# Patient Record
Sex: FEMALE | Race: WHITE | Employment: UNEMPLOYED | URBAN - METROPOLITAN AREA
[De-identification: names, ages, dates, MRNs, and addresses within clinical notes are randomized per-mention and may not be internally consistent; named-entity substitution may affect disease eponyms.]

---

## 2017-06-21 PROBLEM — K80.20 CALCULUS OF GALLBLADDER WITHOUT CHOLECYSTITIS: Status: ACTIVE | Noted: 2017-06-21

## 2017-07-11 PROBLEM — N93.9 ABNORMAL UTERINE BLEEDING (AUB): Status: ACTIVE | Noted: 2017-07-11

## 2017-12-19 PROBLEM — F45.41 STRESS HEADACHES: Status: ACTIVE | Noted: 2017-12-19

## 2017-12-21 PROBLEM — H04.123 BILATERAL DRY EYES: Status: ACTIVE | Noted: 2017-12-21

## 2017-12-21 PROBLEM — H40.033 ANATOMICAL NARROW ANGLE BORDERLINE GLAUCOMA, BILATERAL: Status: ACTIVE | Noted: 2017-12-21

## 2018-01-11 ENCOUNTER — HOSPITAL ENCOUNTER (OUTPATIENT)
Dept: MAMMOGRAPHY | Age: 53
Discharge: HOME | End: 2018-01-11
Payer: COMMERCIAL

## 2018-01-11 DIAGNOSIS — Z12.31 VISIT FOR SCREENING MAMMOGRAM: ICD-10-CM

## 2018-01-11 PROCEDURE — 77067 SCR MAMMO BI INCL CAD: CPT

## 2018-02-27 PROBLEM — N91.2 AMENORRHEA: Status: ACTIVE | Noted: 2018-02-27

## 2018-03-12 ENCOUNTER — HOSPITAL ENCOUNTER (OUTPATIENT)
Dept: CT IMAGING | Age: 53
Discharge: HOME | End: 2018-03-12
Payer: COMMERCIAL

## 2018-03-12 DIAGNOSIS — J34.89 SINUS PRESSURE: ICD-10-CM

## 2018-03-12 PROCEDURE — 70486 CT MAXILLOFACIAL W/O DYE: CPT

## 2018-03-12 NOTE — PROGRESS NOTES
Spoke with patient who Parent voices understanding and acceptance of these results and advice. Patient stated that yes she would like to e referred to an ENT. Patient will call back if any further questions or concerns.

## 2018-03-12 NOTE — PROGRESS NOTES
CT of her sinuses shows that her sinuses are clear. She has a septal deviation to the right with a bone spur. Not really sure if this is contributing to her symptoms or not, since she was complaining of pressure being worse on the left at her last visit. Would be glad to refer her to ENT for further eval if she would like.

## 2018-06-11 ENCOUNTER — HOSPITAL ENCOUNTER (OUTPATIENT)
Dept: SURGERY | Age: 53
Discharge: HOME | End: 2018-06-11

## 2018-06-13 VITALS — HEIGHT: 62 IN | BODY MASS INDEX: 33.86 KG/M2 | WEIGHT: 184 LBS

## 2018-06-13 NOTE — PERIOP NOTES
Patient verified name, , and surgery as listed in [facility]. Type 1B surgery, phone assessment complete. Orders not received. Labs per surgeon: no orders on chart or in EMR at this time  Labs per anesthesia protocol: none    Patient answered medical/surgical history questions at their best of ability. All prior to admission medications documented in [facility]. Patient instructed to take the following medications the day of surgery according to anesthesia guidelines with a small sip of water: synthroid. Hold all vitamins 7 days prior to surgery and NSAIDS 5 days prior to surgery. Medications to be held- vitamins. Patient instructed on the following:  Arrive at A Entrance, time of arrival to be called the day before by 1700  NPO after midnight including gum, mints, and ice chips  Responsible adult must drive patient to the hospital, stay during surgery, and patient will need supervision 24 hours after anesthesia  Use antibacterial soap in shower the night before surgery and on the morning of surgery  Leave all valuables (money and jewelry) at home but bring insurance card and ID on DOS  Do not wear make-up, nail polish, lotions, cologne, perfumes, powders, or oil on skin. Patient teach back successful and patient demonstrates knowledge of instruction.

## 2018-06-17 ENCOUNTER — ANESTHESIA EVENT (OUTPATIENT)
Dept: SURGERY | Age: 53
End: 2018-06-17
Payer: COMMERCIAL

## 2018-06-18 ENCOUNTER — HOSPITAL ENCOUNTER (OUTPATIENT)
Age: 53
Setting detail: OUTPATIENT SURGERY
Discharge: HOME | End: 2018-06-18
Attending: OTOLARYNGOLOGY | Admitting: OTOLARYNGOLOGY
Payer: COMMERCIAL

## 2018-06-18 ENCOUNTER — ANESTHESIA (OUTPATIENT)
Dept: SURGERY | Age: 53
End: 2018-06-18
Payer: COMMERCIAL

## 2018-06-18 VITALS
RESPIRATION RATE: 16 BRPM | TEMPERATURE: 98.3 F | HEIGHT: 62 IN | BODY MASS INDEX: 33.86 KG/M2 | DIASTOLIC BLOOD PRESSURE: 71 MMHG | HEART RATE: 64 BPM | SYSTOLIC BLOOD PRESSURE: 108 MMHG | OXYGEN SATURATION: 94 % | WEIGHT: 184 LBS

## 2018-06-18 LAB — GLUCOSE BLD STRIP.AUTO-MCNC: 78 MG/DL (ref 65–100)

## 2018-06-18 PROCEDURE — 76210000020 HC REC RM PH II FIRST 0.5 HR: Performed by: OTOLARYNGOLOGY

## 2018-06-18 PROCEDURE — 76210000016 HC OR PH I REC 1 TO 1.5 HR: Performed by: OTOLARYNGOLOGY

## 2018-06-18 PROCEDURE — 77030011640 HC PAD GRND REM COVD -A: Performed by: OTOLARYNGOLOGY

## 2018-06-18 PROCEDURE — 76060000032 HC ANESTHESIA 0.5 TO 1 HR: Performed by: OTOLARYNGOLOGY

## 2018-06-18 PROCEDURE — 76010000138 HC OR TIME 0.5 TO 1 HR: Performed by: OTOLARYNGOLOGY

## 2018-06-18 PROCEDURE — 77030002888 HC SUT CHRMC J&J -A: Performed by: OTOLARYNGOLOGY

## 2018-06-18 PROCEDURE — 77030008477 HC STYL SATN SLP COVD -A: Performed by: ANESTHESIOLOGY

## 2018-06-18 PROCEDURE — 74011000250 HC RX REV CODE- 250

## 2018-06-18 PROCEDURE — 74011000250 HC RX REV CODE- 250: Performed by: ANESTHESIOLOGY

## 2018-06-18 PROCEDURE — 74011250636 HC RX REV CODE- 250/636

## 2018-06-18 PROCEDURE — 74011000250 HC RX REV CODE- 250: Performed by: OTOLARYNGOLOGY

## 2018-06-18 PROCEDURE — 74011250636 HC RX REV CODE- 250/636: Performed by: ANESTHESIOLOGY

## 2018-06-18 PROCEDURE — 77030036727 HC DEV INFL BLN SNUS SE DISP ACCL -B: Performed by: OTOLARYNGOLOGY

## 2018-06-18 PROCEDURE — 77030008357 HC SPLNT NSL INT THOM -B: Performed by: OTOLARYNGOLOGY

## 2018-06-18 PROCEDURE — 74011250637 HC RX REV CODE- 250/637: Performed by: OTOLARYNGOLOGY

## 2018-06-18 PROCEDURE — 77030006907 HC BLD SNUS SHV MEDT -C: Performed by: OTOLARYNGOLOGY

## 2018-06-18 PROCEDURE — 77030018836 HC SOL IRR NACL ICUM -A: Performed by: OTOLARYNGOLOGY

## 2018-06-18 PROCEDURE — 77030009845 HC ONTMNT BACITRCN PATT -A: Performed by: OTOLARYNGOLOGY

## 2018-06-18 PROCEDURE — 77030036884 HC CATH GD SPNPLS RELV TIP DISP KT ACCL -G1: Performed by: OTOLARYNGOLOGY

## 2018-06-18 PROCEDURE — 82962 GLUCOSE BLOOD TEST: CPT

## 2018-06-18 PROCEDURE — 77030008703 HC TU ET UNCUF COVD -A: Performed by: ANESTHESIOLOGY

## 2018-06-18 PROCEDURE — 77030002916 HC SUT ETHLN J&J -A: Performed by: OTOLARYNGOLOGY

## 2018-06-18 PROCEDURE — 74011250637 HC RX REV CODE- 250/637: Performed by: ANESTHESIOLOGY

## 2018-06-18 PROCEDURE — 77030020782 HC GWN BAIR PAWS FLX 3M -B: Performed by: ANESTHESIOLOGY

## 2018-06-18 RX ORDER — LIDOCAINE HYDROCHLORIDE AND EPINEPHRINE 10; 10 MG/ML; UG/ML
INJECTION, SOLUTION INFILTRATION; PERINEURAL AS NEEDED
Status: DISCONTINUED | OUTPATIENT
Start: 2018-06-18 | End: 2018-06-18 | Stop reason: HOSPADM

## 2018-06-18 RX ORDER — LIDOCAINE HYDROCHLORIDE 20 MG/ML
INJECTION, SOLUTION EPIDURAL; INFILTRATION; INTRACAUDAL; PERINEURAL AS NEEDED
Status: DISCONTINUED | OUTPATIENT
Start: 2018-06-18 | End: 2018-06-18 | Stop reason: HOSPADM

## 2018-06-18 RX ORDER — GLYCOPYRROLATE 0.2 MG/ML
INJECTION INTRAMUSCULAR; INTRAVENOUS AS NEEDED
Status: DISCONTINUED | OUTPATIENT
Start: 2018-06-18 | End: 2018-06-18 | Stop reason: HOSPADM

## 2018-06-18 RX ORDER — SODIUM CHLORIDE, SODIUM LACTATE, POTASSIUM CHLORIDE, CALCIUM CHLORIDE 600; 310; 30; 20 MG/100ML; MG/100ML; MG/100ML; MG/100ML
75 INJECTION, SOLUTION INTRAVENOUS CONTINUOUS
Status: DISCONTINUED | OUTPATIENT
Start: 2018-06-18 | End: 2018-06-18 | Stop reason: HOSPADM

## 2018-06-18 RX ORDER — LIDOCAINE HYDROCHLORIDE 10 MG/ML
0.1 INJECTION INFILTRATION; PERINEURAL AS NEEDED
Status: DISCONTINUED | OUTPATIENT
Start: 2018-06-18 | End: 2018-06-18 | Stop reason: HOSPADM

## 2018-06-18 RX ORDER — HYDROMORPHONE HYDROCHLORIDE 2 MG/ML
0.5 INJECTION, SOLUTION INTRAMUSCULAR; INTRAVENOUS; SUBCUTANEOUS
Status: DISCONTINUED | OUTPATIENT
Start: 2018-06-18 | End: 2018-06-18 | Stop reason: HOSPADM

## 2018-06-18 RX ORDER — OXYCODONE HYDROCHLORIDE 5 MG/1
5 TABLET ORAL
Status: DISCONTINUED | OUTPATIENT
Start: 2018-06-18 | End: 2018-06-18 | Stop reason: HOSPADM

## 2018-06-18 RX ORDER — OXYMETAZOLINE HCL 0.05 %
SPRAY, NON-AEROSOL (ML) NASAL AS NEEDED
Status: DISCONTINUED | OUTPATIENT
Start: 2018-06-18 | End: 2018-06-18 | Stop reason: HOSPADM

## 2018-06-18 RX ORDER — OXYCODONE HYDROCHLORIDE 5 MG/1
10 TABLET ORAL
Status: DISCONTINUED | OUTPATIENT
Start: 2018-06-18 | End: 2018-06-18 | Stop reason: HOSPADM

## 2018-06-18 RX ORDER — ROCURONIUM BROMIDE 10 MG/ML
INJECTION, SOLUTION INTRAVENOUS AS NEEDED
Status: DISCONTINUED | OUTPATIENT
Start: 2018-06-18 | End: 2018-06-18 | Stop reason: HOSPADM

## 2018-06-18 RX ORDER — MIDAZOLAM HYDROCHLORIDE 1 MG/ML
2 INJECTION, SOLUTION INTRAMUSCULAR; INTRAVENOUS ONCE
Status: DISCONTINUED | OUTPATIENT
Start: 2018-06-18 | End: 2018-06-18 | Stop reason: HOSPADM

## 2018-06-18 RX ORDER — FENTANYL CITRATE 50 UG/ML
100 INJECTION, SOLUTION INTRAMUSCULAR; INTRAVENOUS ONCE
Status: DISCONTINUED | OUTPATIENT
Start: 2018-06-18 | End: 2018-06-18 | Stop reason: HOSPADM

## 2018-06-18 RX ORDER — FENTANYL CITRATE 50 UG/ML
INJECTION, SOLUTION INTRAMUSCULAR; INTRAVENOUS AS NEEDED
Status: DISCONTINUED | OUTPATIENT
Start: 2018-06-18 | End: 2018-06-18 | Stop reason: HOSPADM

## 2018-06-18 RX ORDER — DEXAMETHASONE SODIUM PHOSPHATE 4 MG/ML
INJECTION, SOLUTION INTRA-ARTICULAR; INTRALESIONAL; INTRAMUSCULAR; INTRAVENOUS; SOFT TISSUE AS NEEDED
Status: DISCONTINUED | OUTPATIENT
Start: 2018-06-18 | End: 2018-06-18 | Stop reason: HOSPADM

## 2018-06-18 RX ORDER — ONDANSETRON 2 MG/ML
INJECTION INTRAMUSCULAR; INTRAVENOUS AS NEEDED
Status: DISCONTINUED | OUTPATIENT
Start: 2018-06-18 | End: 2018-06-18 | Stop reason: HOSPADM

## 2018-06-18 RX ORDER — NEOSTIGMINE METHYLSULFATE 1 MG/ML
INJECTION INTRAVENOUS AS NEEDED
Status: DISCONTINUED | OUTPATIENT
Start: 2018-06-18 | End: 2018-06-18 | Stop reason: HOSPADM

## 2018-06-18 RX ORDER — FAMOTIDINE 20 MG/1
20 TABLET, FILM COATED ORAL ONCE
Status: COMPLETED | OUTPATIENT
Start: 2018-06-18 | End: 2018-06-18

## 2018-06-18 RX ORDER — KETOROLAC TROMETHAMINE 30 MG/ML
INJECTION, SOLUTION INTRAMUSCULAR; INTRAVENOUS AS NEEDED
Status: DISCONTINUED | OUTPATIENT
Start: 2018-06-18 | End: 2018-06-18 | Stop reason: HOSPADM

## 2018-06-18 RX ADMIN — DEXAMETHASONE SODIUM PHOSPHATE 10 MG: 4 INJECTION, SOLUTION INTRA-ARTICULAR; INTRALESIONAL; INTRAMUSCULAR; INTRAVENOUS; SOFT TISSUE at 17:09

## 2018-06-18 RX ADMIN — SODIUM CHLORIDE, SODIUM LACTATE, POTASSIUM CHLORIDE, AND CALCIUM CHLORIDE: 600; 310; 30; 20 INJECTION, SOLUTION INTRAVENOUS at 17:30

## 2018-06-18 RX ADMIN — GLYCOPYRROLATE 0.4 MG: 0.2 INJECTION INTRAMUSCULAR; INTRAVENOUS at 17:15

## 2018-06-18 RX ADMIN — FAMOTIDINE 20 MG: 20 TABLET ORAL at 14:16

## 2018-06-18 RX ADMIN — SODIUM CHLORIDE, SODIUM LACTATE, POTASSIUM CHLORIDE, AND CALCIUM CHLORIDE: 600; 310; 30; 20 INJECTION, SOLUTION INTRAVENOUS at 16:38

## 2018-06-18 RX ADMIN — SODIUM CHLORIDE, SODIUM LACTATE, POTASSIUM CHLORIDE, AND CALCIUM CHLORIDE 75 ML/HR: 600; 310; 30; 20 INJECTION, SOLUTION INTRAVENOUS at 14:26

## 2018-06-18 RX ADMIN — FENTANYL CITRATE 100 MCG: 50 INJECTION, SOLUTION INTRAMUSCULAR; INTRAVENOUS at 16:41

## 2018-06-18 RX ADMIN — LIDOCAINE HYDROCHLORIDE 100 MG: 20 INJECTION, SOLUTION EPIDURAL; INFILTRATION; INTRACAUDAL; PERINEURAL at 16:41

## 2018-06-18 RX ADMIN — LIDOCAINE HYDROCHLORIDE 0.1 ML: 10 INJECTION, SOLUTION INFILTRATION; PERINEURAL at 14:26

## 2018-06-18 RX ADMIN — NEOSTIGMINE METHYLSULFATE 3 MG: 1 INJECTION INTRAVENOUS at 17:15

## 2018-06-18 RX ADMIN — ROCURONIUM BROMIDE 30 MG: 10 INJECTION, SOLUTION INTRAVENOUS at 16:41

## 2018-06-18 RX ADMIN — HYDROMORPHONE HYDROCHLORIDE 0.5 MG: 2 INJECTION, SOLUTION INTRAMUSCULAR; INTRAVENOUS; SUBCUTANEOUS at 18:00

## 2018-06-18 RX ADMIN — ONDANSETRON 4 MG: 2 INJECTION INTRAMUSCULAR; INTRAVENOUS at 17:09

## 2018-06-18 RX ADMIN — KETOROLAC TROMETHAMINE 30 MG: 30 INJECTION, SOLUTION INTRAMUSCULAR; INTRAVENOUS at 17:13

## 2018-06-18 RX ADMIN — HYDROMORPHONE HYDROCHLORIDE 0.5 MG: 2 INJECTION, SOLUTION INTRAMUSCULAR; INTRAVENOUS; SUBCUTANEOUS at 18:05

## 2018-06-18 NOTE — DISCHARGE INSTRUCTIONS
Endoscopic Sinus Surgery: What to Expect at [address] will have a drip pad under your nose to collect mucus and blood. Change it only when it bleeds through. You may have to do this every hour for 24 hours after surgery. You may have some swelling of your nose, upper lip, cheeks, or around your eyes. Your nose may be sore and will bleed. You may feel \"stuffed up\" like you have a bad head cold. This will last for several days after surgery. The tip of your nose and your upper lip and gums may be numb. Feeling will return in a few weeks to a few months. Your sense of smell will not be as good after surgery. It will improve and probably return to normal in 1 to 2 months. You will probably be able to return to work or school in about 1 week and to your normal routine in about 3 weeks. However, this varies with your job and the extent of your surgery. Most people feel normal in 1 to 2 months. You will have to visit your doctor regularly for 3 to 4 months after your surgery. Your doctor will check to see that your sinuses are healing well. This care sheet gives you a general idea about how long it will take for you to recover. But each person recovers at a different pace. Follow the steps below to get better as quickly as possible. How can you care for yourself at home? Activity  ? · Rest when you feel tired. Getting enough sleep will help you recover. Do not lie flat. Raise your head with three or four pillows. This can reduce swelling. Try to sleep on your back during the month after surgery. You can also sleep in a reclining chair. ? · Try to walk each day. Start by walking a little more than you did the day before. Bit by bit, increase the amount you walk. Walking boosts blood flow and helps prevent pneumonia and constipation. Also, try to sit and stand as much as you can.   ? · For 1 week, try not to bend over or lift anything heavier than 10 pounds.  This may include a child, heavy grocery bags and milk containers, a heavy briefcase or backpack, cat litter or dog food bags, or a vacuum . ? · You can take a shower or bath. Use lukewarm, not hot water. Avoid swimming for 6 weeks. ? · Avoid sawdust, chemicals, and excessive dust for 4 weeks. ? · Avoid strenuous activities, such as biking, jogging, weight lifting, or aerobic exercise, for 1 week and then ease back into these activities over 2 to 3 weeks. ? · You may drive when you are no longer taking prescription pain medicine and feel up to it. ? · You will probably be able to return to work or school in about 1 week and to your normal routine in about 3 weeks. However, this varies with your job and the extent of your surgery. Diet  ? · You can eat your normal diet. If your stomach is upset, try bland, low-fat foods like plain rice, broiled chicken, toast, and yogurt. ? · You may notice that your bowel movements are not regular right after your surgery. This is common. Try to avoid constipation and straining with bowel movements. You may want to take a fiber supplement every day. If you have not had a bowel movement after a couple of days, ask your doctor about taking a mild laxative. Medicines  ? · Your doctor will tell you if and when you can restart your medicines. He or she will also give you instructions about taking any new medicines. ? · If you take blood thinners, such as warfarin (Coumadin), clopidogrel (Plavix), or aspirin, be sure to talk to your doctor. He or she will tell you if and when to start taking those medicines again. Make sure that you understand exactly what your doctor wants you to do. ? · Do not take aspirin, aspirin-containing medicines, or anti-inflammatory medicines such as ibuprofen (Advil, Motrin) or naproxen (Aleve) for 3 weeks following surgery unless your doctor says it is okay. ? · Take pain medicines exactly as directed.   ¨ If the doctor gave you a prescription medicine for pain, take it as prescribed. ¨ Do not take two or more pain medicines at the same time unless the doctor told you to. Many pain medicines have acetaminophen, which is Tylenol. Too much acetaminophen (Tylenol) can be harmful. ? · If your doctor prescribed antibiotics, take them as directed. Do not stop taking them just because you feel better. You need to take the full course of antibiotics. ? · If you think your pain medicine is making you sick to your stomach:  ¨ Take your medicine after meals (unless your doctor has told you not to). ¨ Ask your doctor for a different pain medicine. Incision care  ? · You probably will not have an incision (cut). You will have a drip pad under your nose to collect blood. Change it only when it has bled through. You may have to do this every hour for 24 hours after surgery. When bleeding stops, you can remove it. ? · If you have packing in your nose, leave it in. Your doctor will take it out. Ice and elevation  ? · To help with swelling and pain, put ice or a cold pack on your nose for 10 to 20 minutes at a time. Put a thin cloth between the ice and your skin. ? · Do not sleep flat. Sleep with your head raised up. You can also sleep in a reclining chair. Other instructions  ? · Do not blow your nose for 2 weeks. ? · Do not put anything into your nose. ? · If you must sneeze, open your mouth and sneeze naturally. ? · Keep your mouth clean. Rinse your mouth with salt water or an alcohol-free mouthwash after each meal and before bedtime. ? · After any packing is removed, use saline (saltwater) nasal washes to help keep your nasal passages open and wash out mucus and bacteria. You can buy saline nose drops at a grocery store or drugstore. ? · You can wear your glasses when you wish. Do not wear contacts until the day after the surgery. ? · Do not travel by airplane for at least 2 weeks.  Your sinuses are still healing, and the changes in air pressure can affect them.   Follow-up care is a key part of your treatment and safety. Be sure to make and go to all appointments, and call your doctor if you are having problems. It's also a good idea to know your test results and keep a list of the medicines you take. When should you call for help? Call 911 anytime you think you may need emergency care. For example, call if:  ? · You passed out (lost consciousness). ? · You have severe trouble breathing. ? · You have chest pain, have shortness of breath, or you cough up blood. ?Call your doctor now or seek immediate medical care if:  ? · You have signs of infection, such as:  ¨ Increased pain, swelling, warmth, or redness. ¨ Red streaks leading from the incision. ¨ Pus draining from the incision. ¨ A fever. ? · You bleed through the bandage. ? · You have symptoms of a blood clot in your leg (called a deep vein thrombosis), such as:  ¨ Pain in the calf, back of the knee, thigh, or groin. ¨ Redness and swelling in your leg or groin. ? · You have pain that does not get better after you take pain medicine. ? Watch closely for changes in your health, and be sure to contact your doctor if:  ? · You do not get better as expected. Where can you learn more? Go to http://sophia-jena.info/. Enter N089 in the search box to learn more about \"Endoscopic Sinus Surgery: What to Expect at Home. \"  Current as of: May 12, 2017  Content Version: 11.4  © 8232-4900 [employer]. Care instructions adapted under license by [employer] (which disclaims liability or warranty for this information). If you have questions about a medical condition or this instruction, always ask your healthcare professional. [address] any warranty or liability for your use of this information.

## 2018-06-18 NOTE — IP AVS SNAPSHOT
[address] [facility] [address] [address] 
[phone] Patient: Jose Roberto Monteiro MRN: [id number] :1965 About your hospitalization You were admitted on:  2018 You last received care in the:  [facility] PACU You were discharged on:  2018 Why you were hospitalized Your primary diagnosis was:  Not on File Follow-up Information None Your Scheduled Appointments  10:30 AM EDT Office Visit with Heloise Hamman, PA [address] ([address] [facility]) [place] [address]  
[phone] 2018  8:30 AM EDT  
POST OP with DO MARY Hamilton E[facility] [address] [facility]) [address] [place] [address]  
[phone] Discharge Orders None A check ramsey indicates which time of day the medication should be taken. My Medications ASK your doctor about these medications Instructions Each Dose to Equal  
 Morning Noon Evening Bedtime  
 azithromycin 250 mg tablet Commonly known as:  Sebastian Gonsales Your last dose was: Your next dose is: Take 1 Tab by mouth See Admin Instructions. 250 mg  
    
   
   
   
  
 escitalopram oxalate 5 mg tablet Commonly known as:  Alexsandra Jean-Baptiste Your last dose was: Your next dose is: Take 1 Tab by mouth daily. 5 mg  
    
   
   
   
  
 exenatide microspheres 2 mg/0.85 mL Atin Commonly known as:  Mariel Ly Your last dose was: Your next dose is:    
   
   
 Inject once weekly Flaxseed Oil Oil Your last dose was: Your next dose is:    
   
   
 by Does Not Apply route. HYDROcodone-acetaminophen 5-325 mg per tablet Commonly known as:  Yesika Winter Your last dose was: Your next dose is: Take 1-2 tablets every 4-6 hours prn pain Insulin Needles (Disposable) 31 gauge x 5/16\" Ndle Your last dose was: Your next dose is: For daily Victoza injections  
     
   
   
   
  
 lisinopril 5 mg tablet Commonly known as:  Eren Jim Your last dose was: Your next dose is: Take 1 Tab by mouth daily. Indications: hypertension 5 mg  
    
   
   
   
  
 metFORMIN 500 mg tablet Commonly known as:  GLUCOPHAGE Your last dose was: Your next dose is: Take 1 Tab by mouth daily (with breakfast). 500 mg  
    
   
   
   
  
 ondansetron 4 mg disintegrating tablet Commonly known as:  ZOFRAN ODT Your last dose was: Your next dose is: Take 1 Tab by mouth every eight (8) hours as needed for Nausea. 4 mg  
    
   
   
   
  
 synthroid 50 mcg tablet Generic drug:  levothyroxine Your last dose was: Your next dose is: Take 1 Tab by mouth Daily (before breakfast). ANDREA  Indications: hypothyroidism 50 mcg  
    
   
   
   
  
 topiramate 50 mg tablet Commonly known as:  TOPAMAX Your last dose was: Your next dose is:    
   
   
 1 po at HS Opioid Education Prescription Opioids: What You Need to Know: 
 
Prescription opioids can be used to help relieve moderate-to-severe pain and are often prescribed following a surgery or injury, or for certain health conditions. These medications can be an important part of treatment but also come with serious risks. Opioids are strong pain medicines. Examples include hydrocodone, oxycodone, fentanyl, and morphine. Heroin is an example of an illegal opioid. It is important to work with your health care provider to make sure you are getting the safest, most effective care. WHAT ARE THE RISKS AND SIDE EFFECTS OF OPIOID USE? Prescription opioids carry serious risks of addiction and overdose, especially with prolonged use. An opioid overdose, often marked by slow breathing, can cause sudden death. The use of prescription opioids can have a number of side effects as well, even when taken as directed. · Tolerance-meaning you might need to take more of a medication for the same pain relief · Physical dependence-meaning you have symptoms of withdrawal when the medication is stopped. Withdrawal symptoms can include nausea, sweating, chills, diarrhea, stomach cramps, and muscle aches. Withdrawal can last up to several weeks, depending on which drug you took and how long you took it. · Increased sensitivity to pain · Constipation · Nausea, vomiting, and dry mouth · Sleepiness and dizziness · Confusion · Depression · Low levels of testosterone that can result in lower sex drive, energy, and strength · Itching and sweating RISKS ARE GREATER WITH:      
· History of drug misuse, substance use disorder, or overdose · Mental health conditions (such as depression or anxiety) · Sleep apnea · Older age (72 years or older) · Pregnancy Avoid alcohol while taking prescription opioids. Also, unless specifically advised by your health care provider, medications to avoid include: · Benzodiazepines (such as Xanax or Valium) · Muscle relaxants (such as Soma or Flexeril) · Hypnotics (such as Ambien or Lunesta) · Other prescription opioids KNOW YOUR OPTIONS Talk to your health care provider about ways to manage your pain that don't involve prescription opioids. Some of these options may actually work better and have fewer risks and side effects. Options may include: 
· Pain relievers such as acetaminophen, ibuprofen, and naproxen · Some medications that are also used for depression or seizures · Physical therapy and exercise · Counseling to help patients learn how to cope better with triggers of pain and stress. · Application of heat or cold compress · Massage therapy · Relaxation techniques Be Informed Make sure you know the name of your medication, how much and how often to take it, and its potential risks & side effects. IF YOU ARE PRESCRIBED OPIOIDS FOR PAIN: 
· Never take opioids in greater amounts or more often than prescribed. Remember the goal is not to be pain-free but to manage your pain at a tolerable level. · Follow up with your primary care provider to: · Work together to create a plan on how to manage your pain. · Talk about ways to help manage your pain that don't involve prescription opioids. · Talk about any and all concerns and side effects. · Help prevent misuse and abuse. · Never sell or share prescription opioids · Help prevent misuse and abuse. · Store prescription opioids in a secure place and out of reach of others (this may include visitors, children, friends, and family). · Safely dispose of unused/unwanted prescription opioids: Find your community drug take-back program or your pharmacy mail-back program, or flush them down the toilet, following guidance from the Food and Drug Administration (www.fda.gov/Drugs/ResourcesForYou). · Visit www.cdc.gov/drugoverdose to learn about the risks of opioid abuse and overdose. · If you believe you may be struggling with addiction, tell your health care provider and ask for guidance or call [address] [place] [address] at [phone]. Discharge Instructions Endoscopic Sinus Surgery: What to Expect at [facility] Your Recovery You will have a drip pad under your nose to collect mucus and blood. Change it only when it bleeds through. You may have to do this every hour for 24 hours after surgery. You may have some swelling of your nose, upper lip, cheeks, or around your eyes. Your nose may be sore and will bleed.  You may feel \"stuffed up\" like you have a bad head cold. This will last for several days after surgery. The tip of your nose and your upper lip and gums may be numb. Feeling will return in a few weeks to a few months. Your sense of smell will not be as good after surgery. It will improve and probably return to normal in 1 to 2 months. You will probably be able to return to work or school in about 1 week and to your normal routine in about 3 weeks. However, this varies with your job and the extent of your surgery. Most people feel normal in 1 to 2 months. You will have to visit your doctor regularly for 3 to 4 months after your surgery. Your doctor will check to see that your sinuses are healing well. This care sheet gives you a general idea about how long it will take for you to recover. But each person recovers at a different pace. Follow the steps below to get better as quickly as possible. How can you care for yourself at home? Activity ? · Rest when you feel tired. Getting enough sleep will help you recover. Do not lie flat. Raise your head with three or four pillows. This can reduce swelling. Try to sleep on your back during the month after surgery. You can also sleep in a reclining chair. ? · Try to walk each day. Start by walking a little more than you did the day before. Bit by bit, increase the amount you walk. Walking boosts blood flow and helps prevent pneumonia and constipation. Also, try to sit and stand as much as you can.  
? · For 1 week, try not to bend over or lift anything heavier than 10 pounds. This may include a child, heavy grocery bags and milk containers, a heavy briefcase or backpack, cat litter or dog food bags, or a vacuum . ? · You can take a shower or bath. Use lukewarm, not hot water. Avoid swimming for 6 weeks. ? · Avoid sawdust, chemicals, and excessive dust for 4 weeks.   
? · Avoid strenuous activities, such as biking, jogging, weight lifting, or aerobic exercise, for 1 week and then ease back into these activities over 2 to 3 weeks. ? · You may drive when you are no longer taking prescription pain medicine and feel up to it. ? · You will probably be able to return to work or school in about 1 week and to your normal routine in about 3 weeks. However, this varies with your job and the extent of your surgery. Diet ? · You can eat your normal diet. If your stomach is upset, try bland, low-fat foods like plain rice, broiled chicken, toast, and yogurt. ? · You may notice that your bowel movements are not regular right after your surgery. This is common. Try to avoid constipation and straining with bowel movements. You may want to take a fiber supplement every day. If you have not had a bowel movement after a couple of days, ask your doctor about taking a mild laxative. Medicines ? · Your doctor will tell you if and when you can restart your medicines. He or she will also give you instructions about taking any new medicines. ? · If you take blood thinners, such as warfarin (Coumadin), clopidogrel (Plavix), or aspirin, be sure to talk to your doctor. He or she will tell you if and when to start taking those medicines again. Make sure that you understand exactly what your doctor wants you to do. ? · Do not take aspirin, aspirin-containing medicines, or anti-inflammatory medicines such as ibuprofen (Advil, Motrin) or naproxen (Aleve) for 3 weeks following surgery unless your doctor says it is okay. ? · Take pain medicines exactly as directed. ¨ If the doctor gave you a prescription medicine for pain, take it as prescribed. ¨ Do not take two or more pain medicines at the same time unless the doctor told you to. Many pain medicines have acetaminophen, which is Tylenol. Too much acetaminophen (Tylenol) can be harmful. ? · If your doctor prescribed antibiotics, take them as directed.  Do not stop taking them just because you feel better. You need to take the full course of antibiotics. ? · If you think your pain medicine is making you sick to your stomach: 
¨ Take your medicine after meals (unless your doctor has told you not to). ¨ Ask your doctor for a different pain medicine. Incision care ? · You probably will not have an incision (cut). You will have a drip pad under your nose to collect blood. Change it only when it has bled through. You may have to do this every hour for 24 hours after surgery. When bleeding stops, you can remove it. ? · If you have packing in your nose, leave it in. Your doctor will take it out. Ice and elevation ? · To help with swelling and pain, put ice or a cold pack on your nose for 10 to 20 minutes at a time. Put a thin cloth between the ice and your skin. ? · Do not sleep flat. Sleep with your head raised up. You can also sleep in a reclining chair. Other instructions ? · Do not blow your nose for 2 weeks. ? · Do not put anything into your nose. ? · If you must sneeze, open your mouth and sneeze naturally. ? · Keep your mouth clean. Rinse your mouth with salt water or an alcohol-free mouthwash after each meal and before bedtime. ? · After any packing is removed, use saline (saltwater) nasal washes to help keep your nasal passages open and wash out mucus and bacteria. You can buy saline nose drops at a grocery store or drugstore. ? · You can wear your glasses when you wish. Do not wear contacts until the day after the surgery. ? · Do not travel by airplane for at least 2 weeks. Your sinuses are still healing, and the changes in air pressure can affect them. Follow-up care is a key part of your treatment and safety. Be sure to make and go to all appointments, and call your doctor if you are having problems. It's also a good idea to know your test results and keep a list of the medicines you take. When should you call for help? Call 911 anytime you think you may need emergency care. For example, call if: 
? · You passed out (lost consciousness). ? · You have severe trouble breathing. ? · You have chest pain, have shortness of breath, or you cough up blood. ?Call your doctor now or seek immediate medical care if: 
? · You have signs of infection, such as: 
¨ Increased pain, swelling, warmth, or redness. ¨ Red streaks leading from the incision. ¨ Pus draining from the incision. ¨ A fever. ? · You bleed through the bandage. ? · You have symptoms of a blood clot in your leg (called a deep vein thrombosis), such as: 
¨ Pain in the calf, back of the knee, thigh, or groin. ¨ Redness and swelling in your leg or groin. ? · You have pain that does not get better after you take pain medicine. ? Watch closely for changes in your health, and be sure to contact your doctor if: 
? · You do not get better as expected. Where can you learn more? Go to http://sophia-jena.info/. Enter Q554 in the search box to learn more about \"Endoscopic Sinus Surgery: What to Expect at Home. \" Current as of: May 12, 2017 Content Version: 11.4 © 9304-6919 [employer]. Care instructions adapted under license by [employer] (which disclaims liability or warranty for this information). If you have questions about a medical condition or this instruction, always ask your healthcare professional. [place] [address] any warranty or liability for your use of this information. Introducing [facility]! Dear Hernan Ramirez: 
Thank you for requesting a [employer] account. Our records indicate that you already have an active [employer] account. You can access your account anytime at [email or web address] Did you know that you can access your hospital and ER discharge instructions at any time in [employer]?   You can also review all of your test results from your hospital stay or ER visit. Additional Information If you have questions, please visit the Frequently Asked Questions section of the MyChart website at [email or web address] Remember, [employer] is NOT to be used for urgent needs. For medical emergencies, dial 911. Now available from your iPhone and Android! Introducing [address] As a [facility] patient, I wanted to make you aware of our electronic visit tool called [address]. [employer]Alaris allows you to connect within minutes with a medical provider 24 hours a day, seven days a week via a mobile device or tablet or logging into a secure website from your computer. You can access [address] from anywhere in the [place]. A virtual visit might be right for you when you have a simple condition and feel like you just dont want to get out of bed, or cant get away from work for an appointment, when your regular [facility] provider is not available (evenings, weekends or holidays), or when youre out of town and need minor care. Electronic visits cost only $49 and if the [employer]Alaris provider determines a prescription is needed to treat your condition, one can be electronically transmitted to a nearby pharmacy*. Please take a moment to enroll today if you have not already done so. The enrollment process is free and takes just a few minutes. To enroll, please download the [employer] nehemias to your tablet or phone, or visit [email or web address] to enroll on your computer. And, as an [address] [place] [address] patient with a [facility] account, the results of your visits will be scanned into your electronic medical record and your primary care provider will be able to view the scanned results. We urge you to continue to see your regular [facility] provider for your ongoing medical care.   And while your primary care provider may not be the one available when you seek a [address] virtual visit, the peace of mind you get from getting a real diagnosis real time can be priceless. For more information on [address], view our Frequently Asked Questions (FAQs) at [email or web address]. Sincerely, 
 
Jewels Edwards MD 
Chief Medical Officer Shabana Colby *:  certain medications cannot be prescribed via [address] Providers Seen During Your Hospitalization Provider Specialty Primary office phone Marnie Berry DO Otolaryngology [phone] Your Primary Care Physician (PCP) Primary Care Physician Office Phone Office Fax Batsheva Vitaleo [phone] You are allergic to the following Allergen Reactions Amoxicillin Rash Recent Documentation Height Weight BMI OB Status Smoking Status 1.575 m 83.5 kg 33.65 kg/m2 Ablation Never Smoker Emergency Contacts Name Discharge Info Relation Home Work Mobile Lorin Santamaria DISCHARGE CAREGIVER [3] Spouse [3] [phone] Patient Belongings The following personal items are in your possession at time of discharge: 
  Dental Appliances: None  Visual Aid: Glasses Please provide this summary of care documentation to your next provider. Signatures-by signing, you are acknowledging that this After Visit Summary has been reviewed with you and you have received a copy. Patient Signature:  ____________________________________________________________ Date:  ____________________________________________________________  
  
[facility] Provider Signature:  ____________________________________________________________ Date:  ____________________________________________________________

## 2018-06-18 NOTE — ANESTHESIA PREPROCEDURE EVALUATION
Anesthetic History               Review of Systems / Medical History  Patient summary reviewed and pertinent labs reviewed    Pulmonary                   Neuro/Psych              Cardiovascular    Hypertension: well controlled              Exercise tolerance: >4 METS     GI/Hepatic/Renal                Endo/Other    Diabetes: type 2  Hypothyroidism: well controlled       Other Findings              Physical Exam    Airway  Mallampati: I  TM Distance: > 6 cm  Neck ROM: normal range of motion   Mouth opening: Normal     Cardiovascular  Regular rate and rhythm,  S1 and S2 normal,  no murmur, click, rub, or gallop  Rhythm: regular  Rate: normal         Dental  No notable dental hx       Pulmonary  Breath sounds clear to auscultation               Abdominal         Other Findings            Anesthetic Plan    ASA: 3  Anesthesia type: general            Anesthetic plan and risks discussed with: Patient

## 2018-06-18 NOTE — ANESTHESIA POSTPROCEDURE EVALUATION
Post-Anesthesia Evaluation and Assessment    Patient: Justin Ellison MRN: [id number]  SSN: [id number]    YOB: 1965  Age: 50 y.o. Sex: female       Cardiovascular Function/Vital Signs  Visit Vitals    /66    Pulse (!) 57    Temp 36.8 °C (98.3 °F)    Resp 16    Ht 5' 2\" (1.575 m)    Wt 83.5 kg (184 lb)    SpO2 95%    BMI 33.65 kg/m2       Patient is status post general anesthesia for Procedure(s):  SEPTOPLASTY  TURBINATE REDUCTION  BILATERAL MAXILLARY, FRONTAL, AND SPENOID BALLOON SINUPLASTIES. Nausea/Vomiting: None    Postoperative hydration reviewed and adequate. Pain:  Pain Scale 1: Numeric (0 - 10) (06/18/18 1816)  Pain Intensity 1: 1 (06/18/18 1816)   Managed    Neurological Status:   Neuro (WDL): Within Defined Limits (06/18/18 1816)   At baseline    Mental Status and Level of Consciousness: Arousable    Pulmonary Status:   Room air  Adequate oxygenation and airway patent    Complications related to anesthesia: None    Post-anesthesia assessment completed.  No concerns    Signed By: Consuella Barthel, MD     June 18, 2018

## 2018-06-19 NOTE — OP NOTES
[facility] REPORT    Bryanna Gonzales  MR#: [id number]  : 1965  ACCOUNT #: [de-identified]   DATE OF SERVICE: 2018    PREOPERATIVE DIAGNOSES:  Chronic sinusitis, deviated nasal septum, inferior turbinate hypertrophy, nasal obstruction. POSTOPERATIVE DIAGNOSES:  Chronic sinusitis, deviated nasal septum, inferior turbinate hypertrophy, nasal obstruction. PROCEDURES PERFORMED:  Septoplasty, bilateral submucosal resection of the inferior turbinates, bilateral maxillary balloon sinuplasty with removal of contents, bilateral sphenoid balloon sinuplasty with removal of contents, bilateral frontal balloon sinuplasty with removal of contents. SURGEON:  Anselmo Pickering. Roel Monroy MD    ASSISTANT:  None. ANESTHESIA:  General.    COMPLICATIONS:  None. ESTIMATED BLOOD LOSS:  100 mL. HISTORY:  This 53-year-old female came to see me in the office. She has a history of chronic allergies and chronic sinus issues. She is getting headaches. They have been slowly getting worse and worse, progressing over time. Headaches are located in the maxillary, ethmoid and frontal sinus areas. There is nasal congestion, allergy symptoms, itchy watery eyes, postnasal drip, runny nose. She has been on nasal steroid sprays, antihistamines, steroids, antibiotics, all with very short-term improvement. I did end up checking a CT scan, which showed inflammation within all of her sinuses. She has a deviated nasal septum that is severe to the right and bilateral severe inferior turbinate hypertrophy. Since she has been nonresponsive to medical therapy, it was my recommendation she undergo septoplasty, inferior turbinate reduction and bilateral maxillary, ethmoid and sphenoid balloon sinuplasties, all with possible biopsy. PROCEDURE:  Risks and benefits were discussed with the patient in the office. All questions were answered and she is agreeable to the surgery. Surgery itself: The patient was identified in the preoperative waiting area. She was taken back to the operating room, where she underwent general anesthesia. Approximately 3 mL of 1% lidocaine with epinephrine were injected into the inferior turbinates, middle turbinates and septum bilaterally and Afrin-soaked pledgets were placed in each side of the nose and left there for a few minutes. These were then removed. A left-sided hemitransfixion incision was made in the anterior septum. A right-sided submucoperichondrial and submucoperiosteal flap were then raised. I came back to the bony cartilaginous junction, broke through that and raised a left-sided submucoperiosteal flap. Deviated portions of the nasal septum were both bony and cartilaginous. Also involved the maxillary crest, so using a combination of caudal elevator, Lenny forceps and open [place] and hammer and chisel, I was able to isolate and remove the deviated portions of the nasal septum and removed. Then, 4-0 chromic was used to reapproximate the septal flaps and this was also used to close the hemitransfixion incision. A knife was then used to make a small stab incision in the anterior portion of the inferior turbinates. Quenten Cat was used to create a small pocket between the bone of the inferior turbinate and mucosa medially. A microdebrider with a turbinate blade was used to reduce the prominent bone and tissue out of the inferior turbinates bilaterally. A Boies elevator was used to medialize and then lateralize the inferior turbinates, giving the patient a widely patent nasal airway. Sphenoid sinuses were done first.  There was the same procedure on the left and right. I was able to feed the guidewire and balloons into the sphenoid sinus. The balloons were inflated with a pressure of 12, deflated. The sinuses were irrigated using 120 mL of saline.   Reinspection revealed thickened mucosa just inside the sinus opening, mainly along the floor of the sinus, which was removed and the sinus appeared to be open and clear. The frontal sinuses were done next. Starting on the left as the same procedure on the right, I was able to feed the guidewire and the balloons into the frontal sinuses. The balloons were inflated to a pressure of 12, deflated. The sinuses were irrigated using 120 mL of saline. Reinspection revealed thickened mucosa within the nasal frontal ducts, which were removed, and then the sinuses appeared to be open and clear. Finally, the maxillary sinuses were done, starting on the left as the same procedure on the right. I was able to feed the guidewire and the balloons into the maxillary sinuses. Balloons were inflated to a pressure of 12, deflated. The sinuses were irrigated using 120 mL of saline. Reinspection revealed thickened mucosa just inside the sinus openings posteriorly and inferiorly, which was removed and the sinuses appeared to be open and clear. Romeo splints with antibiotic ointment on them were placed inside the nose and sewn in place inferiorly using a single nylon stitch. Then, the patient was awakened and taken to postop recovery room in stable condition.       DO DELORES Sellers  D: 06/18/2018 17:27     T: 06/18/2018 17:55  JOB #: [id number]

## 2018-06-26 PROBLEM — H40.033 BORDERLINE GLAUCOMA WITH ANATOMICAL NARROW ANGLE OF BOTH EYES: Status: ACTIVE | Noted: 2018-06-26

## 2018-12-13 PROBLEM — H04.123 BILATERAL DRY EYES: Status: RESOLVED | Noted: 2017-12-21 | Resolved: 2018-12-13

## 2018-12-13 PROBLEM — Z98.890 HISTORY OF ENDOMETRIAL ABLATION: Status: ACTIVE | Noted: 2018-12-13

## 2018-12-13 PROBLEM — M25.541 ARTHRALGIA OF BOTH HANDS: Status: ACTIVE | Noted: 2018-12-13

## 2018-12-13 PROBLEM — M25.542 ARTHRALGIA OF BOTH HANDS: Status: ACTIVE | Noted: 2018-12-13

## 2018-12-13 PROBLEM — N91.2 AMENORRHEA: Status: RESOLVED | Noted: 2018-02-27 | Resolved: 2018-12-13

## 2018-12-13 PROBLEM — N93.9 ABNORMAL UTERINE BLEEDING (AUB): Status: RESOLVED | Noted: 2017-07-11 | Resolved: 2018-12-13

## 2019-03-18 ENCOUNTER — HOSPITAL ENCOUNTER (OUTPATIENT)
Dept: PHYSICAL THERAPY | Age: 54
Discharge: HOME | End: 2019-03-18
Payer: COMMERCIAL

## 2019-03-18 DIAGNOSIS — M25.512 ACUTE PAIN OF LEFT SHOULDER: ICD-10-CM

## 2019-03-18 DIAGNOSIS — M75.52 BURSITIS OF LEFT SHOULDER: ICD-10-CM

## 2019-03-18 PROCEDURE — 97014 ELECTRIC STIMULATION THERAPY: CPT

## 2019-03-18 PROCEDURE — 97110 THERAPEUTIC EXERCISES: CPT

## 2019-03-18 PROCEDURE — 97163 PT EVAL HIGH COMPLEX 45 MIN: CPT

## 2019-03-18 NOTE — THERAPY EVALUATION
Dante Mendoza  : 1965  [facility]: [facility]  Secondary:  [address]  at [facility]  [address], [address] [facility], [place], [facility]  [phone]   [phone]        OUTPATIENT PHYSICAL THERAPY:Initial Assessment 3/18/2019   :ICD-10: Treatment Diagnosis: Pain in left shoulder (M25.512)  Shoulder lesion, unspecified, left shoulder (M75.92)  Stiffness of left shoulder, not elsewhere classified (M25.612)    Precautions/Allergies:   Amoxicillin   MD Orders: PT evaluation and treatment  MEDICAL/REFERRING DIAGNOSIS:  Acute pain of left shoulder [M25.512]  Bursitis of left shoulder [M75.52]   DATE OF ONSET: 2019   REFERRING PHYSICIAN: Edgar Bills*  RETURN PHYSICIAN APPOINTMENT:   Future Appointments   Date Time Provider Greta Mares   3/21/2019  3:15 PM Adonay Sealeena, PT [facility] Polimaurice   3/22/2019  9:50 AM Danny Carlisle PA [facility] PVD   3/25/2019  9:00 AM Adonay Searing, PT [facility]   3/27/2019  8:15 AM Marcellus Searing, PT [facility]   2019  9:00 AM Adonay Searing, PT [facility]   2019  9:00 AM PVF LAB [facility]   2019 10:30 AM Danny Carlisle PA [facility] P[facility]          INITIAL ASSESSMENT:  Ms. Niru Forrest    is a pleasant active female presenting with left shoulder bursitis and supraspinatus tendinitis. The condition is severe, irritable and inflammatory. Her elevation ROM is limited and with pushing. DASH is 38/55. Recent medrol injection was temporarily effective but the shoulder has since returned to prior status. Skilled PT is indicated for this patient's left shoulder. Impingement and supraspinatus testing is positive . External rotation is firm but trigger points denoted in the scapular region.                                                       Left                              AROM   STRENGTH   PROM  NORMALS  Flexion                    120* 3+                 130*              160  ABDUCTION          110*         3+                120 *              160  Extension                60             4                  60                 60  ER                              60            4                    65                 85   IR                               L3             4                   50               T12    Skilled PT  Is indicated for this patient's functional mobility deficits. PROBLEM LIST (Impacting functional limitations):  1. Decreased Strength  2. Decreased ADL/Functional Activities  3. Decreased Activity Tolerance  4. Decreased Work Simplification/Energy Conservation Techniques  5. Decreased Flexibility/Joint Mobility  6. Decreased [place] with Home Exercise Program  7. Decreased Shoulder ROM from inflammation, irritability, severity. INTERVENTIONS PLANNED :   1. Manual Therapy   2. Therapeutic Exercise   3. Modalities Cold and Hot               4.         Ultrasound               5.         Unattended estim      GOALS: (Goals have been discussed and agreed upon with patient.)  Rehabilitation Potential For Stated Goals: Good  Time Frame: up to 45 days     1. Decrease pain below 6/10 for the left shoulder with beginning home ADL's / shoulder mobility. 2. Decrease DASH from 30 in the left shoulder to indicate functional improvement and to demonstrate improved range of motion and functional improvement of the shoulder. 3. Patient to be independent with an initial HEP for the left shoulder for mobility and strengthening. DISCHARGE GOALS: up to 90 days     1. Decrease pain below 3/10 for the left shoulder with home ADL's / shoulder mobility. 2. Decrease DASH to 20 in the left shoulder to indicate functional improvement and to demonstrate improved strength and mobility of the right shoulder    3. Patient to be independent with an advanced HEP for the left shoulder for mobility and strengthening.           OUTCOME MEASURE:   Tool Used: Disabilities of the Arm, Shoulder and Hand (DASH) Questionnaire - Quick Version  Score:  Initial: 38/55  Most Recent: X/55 (Date: -- )   Interpretation of Score: The DASH is designed to measure the activities of daily living in person's with upper extremity dysfunction or pain. Each section is scored on a 1-5 scale, 5 representing the greatest disability. The scores of each section are added together for a total score of 55. MEDICAL NECESSITY:   · Patient demonstrates fair rehab potential due to higher previous functional level. and currently highly inflammatory, irritable and severe   REASON FOR SERVICES/OTHER COMMENTS:  · Patient continues to require skilled intervention due to medical complications and and irritability and inflammation of the left shoulder . Total Duration:       Rehabilitation Potential For Stated Goals: Fair to good   Regarding Iris Santamaria's therapy, I certify that the treatment plan above will be carried out by a therapist or under their direction. Thank you for this referral,  Murphy Billings PT     Referring Physician Signature: Melvina Mancera*      PAIN/SUBJECTIVE:   Initial:   8/10  Post Session:  8/10   HISTORY:   History of Injury/Illness (Reason for Referral):  TENZIN GALE 3/11/2019   [facility]  Marcial Mccann is a 48 y.o. female who presents in the office today for          Chief Complaint   Patient presents with    Follow-up       Follow-up bursitis left shoulder - pain migrating down      Left shoulder pain onset around 2/18/19. No injury. She has pain in the front of her left shoulder and on the posterior side as well. She is right hand dominant. Shoulder was injected on 2/25/19 w/ Depomedrol + Lidocaine. Injection gave her partial relief for only 3 days. Pain is worsening. Seems to be aggravated by her work  (works at [employer], hanging up clothes all day). Now pain migrating down left arm.   No numbness/tingling or weakness in arm.       PER PT 3/18/2019: This kind patient demonstrates pain in the left shoulder with pushing more than pulling and pain with elevation with increased shoulder pain. She reports that the pain in the shoulder is intense with high irritability, inflammation and severity even after a steroid injection. She has been out of work for a couple of weeks and is looking at potentially another one. The pain is bullseye palpable in the left supraspinatus and bursae region. It increases with palpation. She also demonstrates tenderness in the left shoulder and anterior bicipital groove region. She is unable to lift or push without pain. Past Medical History/Comorbidities:   Ms. Princess Mejia  has a past medical history of Adverse effect of anesthesia, Calculus of ureter, Depression, Diabetes ([address] Headache, Hypertension, Hypothyroidism, and Kidney stone. Ms. Princess Mejia  has a past surgical history that includes hx breast reduction (1999); hx heent; fragment kidney stone/ eswl; hx trabeculectomy (2011); hx colonoscopy (2017); and hx dilation and curettage (10/2017). Social History/living Environment:     no limitations   Prior Level of Function/Work/Activity:  Had been independent with all functional mobility and working at [employer]. Dominant Side:         RIGHT  Personal Factors:          Sex:  female        Age:  48 y.o. Ambulatory/Rehab Services H2 Model Falls Risk Assessment   Risk Factors:       No Risk Factors Identified Ability to Rise from Chair:       (0)  Ability to rise in a single movement   Falls Prevention Plan:       No modifications necessary   Total: (5 or greater = High Risk): 0   ©2010 [facility] of [employer]. All Rights Reserved. [facility] Patent #[id number].  Federal Law prohibits the replication, distribution or use without written permission from [facility] [employer]   Current Medications:       Current Outpatient Medications:     naproxen (NAPROSYN) 500 mg tablet, Take 1 Tab by mouth two (2) times daily (with meals). , Disp: 60 Tab, Rfl: 0    escitalopram oxalate (LEXAPRO) 10 mg tablet, Take 1 Tab by mouth nightly., Disp: 30 Tab, Rfl: 5    lisinopril (PRINIVIL, ZESTRIL) 5 mg tablet, Take 1 Tab by mouth nightly., Disp: 30 Tab, Rfl: 5    metFORMIN (GLUCOPHAGE) 500 mg tablet, Take 1 Tab by mouth nightly., Disp: 30 Tab, Rfl: 5    exenatide microspheres (BYDUREON BCISE) 2 mg/0.85 mL atIn, Inject once weekly, Disp: 4 Syringe, Rfl: 5    topiramate (TOPAMAX) 50 mg tablet, 1 po at HS, Disp: 30 Tab, Rfl: 5    SYNTHROID 50 mcg tablet, Take 1 Tab by mouth Daily (before breakfast). ANDREA, Disp: 30 Tab, Rfl: 11    raNITIdine (ZANTAC) 150 mg tablet, Take 1 Tab by mouth two (2) times daily as needed for Indigestion. , Disp: 60 Tab, Rfl: 5    Flaxseed Oil oil, by Does Not Apply route., Disp: , Rfl:    Date Last Reviewed:  3/18/2019   Number of Personal Factors/Comorbidities that affect the Plan of Care: 3+: HIGH COMPLEXITY   EXAMINATION:                                                               Left                              AROM   STRENGTH   PROM  NORMALS  Flexion                    120*        3+                 130*              160  ABDUCTION          110*         3+                120 *             =160  Extension                60             4                  60                    60  ER                              60            4                    65                  85   IR                               L3             4                   50                 T12    Skilled PT  Is indicated for this patient's functional mobility deficits. Strength:See Above            Special Tests:     (+) supraspinatus (+) impingement (+) painful arc   Neurological Screen:    Myotomes: intact. Dermatomes: intact bilateral UE's light touch and proprioception.             Reflexes: normal          Functional Mobility: intact  Balance: intact     Body Structures Involved:  1. Bones  2. Joints  3. Muscles  4. Ligaments Body Functions Affected:  1. Neuromusculoskeletal  2. Movement Related  3. Skin Related  4. Metobolic/Endocrine Activities and Participation Affected:  1. General Tasks and Demands  2. Communication  3. Mobility  4. Self Care  5. Domestic Life  6.  Community, Social and [place]   Number of elements (examined above) that affect the Plan of Care: 4+: HIGH COMPLEXITY   CLINICAL PRESENTATION:   Presentation: Evolving clinical presentation with unstable and unpredictable characteristics: HIGH COMPLEXITY   CLINICAL DECISION MAKING:   Use of outcome tool(s) and clinical judgement create a POC that gives a: Difficult prediction of patient's progress: HIGH COMPLEXITY   [employer] Portal

## 2019-03-18 NOTE — PROGRESS NOTES
Gisella Child  : 1965  [facility]  Secondary:  [address]  at [facility]  [address], [address], [place], [address] [facility]  [phone]   [phone]      OUTPATIENT PHYSICAL THERAPY: Daily Treatment Note 3/18/2019  Pre-treatment Symptoms/Complaints:  Left shoulder high irritability, severity and inflammation. Bulls eye left supraspinatus and bursa region. Pain: Initial:   8/10 in the left shoulder  Post Session:  8/10 continued pain in the left shoulder    Medications Last Reviewed:  3/18/2019  Updated Objective Findings:  See evaluation note from today   TREATMENT:   (In addition to Assessment/Re-Assessment sessions the following treatments were rendered)     THERAPEUTIC EXERCISE: (10 minutes):  Exercises per grid below to improve mobility, strength and dynamic movement of arm - right to improve functional bending, lifting, carrying, reaching, catching and overhead activites. Required moderate visual, verbal, manual and tactile cues to promote proper body alignment, promote proper body posture and promote proper body mechanics. Progressed resistance, range, repetitions and complexity of movement as indicated. Date:  3/18/2019            Activity/Exercise Parameters          Isometric flexion, abduction, ER/IR, Extension  10 x's with education and hold for the left shoulder. Modalities ( 15 mins) left shoulder ice pack layered while supine with wedge under LE's to decrease inflammation and pain in the shoulder. Skin was clear and intact. IFC estim  hertz quadrapolar to the left shoulder to decrease pain and sensitivity. 10 mins of heat to left shoulder during initial evaluation layered.        MANUAL THERAPY: ( 0 minutes):        Treatment/Session Assessment:  Initial evaluation and  Treatment rendered per this note. · Response to Treatment: left shoulder high irritability, inflammation and severity. · Communication/Consultation:  HEP issued   · Equipment provided today:  HEP . · Compliance with Program/Exercises: compliant all of the time. Recommendations/Intent for next treatment session: \"Next visit will focus on advancements to more challenging activities, reduction in assistance provided for left shoulder.       \".    [employer] Portal    Total Treatment Billable Duration:  25 mins   PT Patient Time In/Time Out  Time In: 0568  Time Out: Gideon 88, PT    Future Appointments   Date Time Provider Greta Mares   3/21/2019  3:15 PM Felicia Driver, PT [facility]   3/22/2019  9:50 AM Julio Carlisle PA [facility] P[facility]   3/25/2019  9:00 AM Felicia Driver, PT [facility] S[facility]   3/27/2019  8:15 AM Felicia Driver PT [facility]   4/1/2019  9:00 AM Felicia Driver PT [facility]   5/30/2019  9:00 AM PVF LAB [facility] P[facility]   6/6/2019 10:30 AM Julio Carlisle PA [facility] P[facility]

## 2019-03-21 ENCOUNTER — HOSPITAL ENCOUNTER (OUTPATIENT)
Dept: PHYSICAL THERAPY | Age: 54
Discharge: HOME | End: 2019-03-21
Payer: COMMERCIAL

## 2019-03-21 PROCEDURE — 97014 ELECTRIC STIMULATION THERAPY: CPT

## 2019-03-21 PROCEDURE — 97110 THERAPEUTIC EXERCISES: CPT

## 2019-03-21 PROCEDURE — 97140 MANUAL THERAPY 1/> REGIONS: CPT

## 2019-03-21 NOTE — PROGRESS NOTES
Feli Santamaria : 1965 Primary: [facility] Secondary:  Therapy Center at [facility] [address] [facility], [address] [place] [address], [place], [facility] [phone]   [phone] OUTPATIENT PHYSICAL THERAPY: Daily Treatment Note 3/21/2019 Pre-treatment Symptoms/Complaints:  Left shoulder high irritability, severity and inflammation. Bulls eye left supraspinatus and bursa region. Pain: Initial:   7/10 in the left shoulder  Post Session:  6/10 continued pain in the left shoulder Medications Last Reviewed:  3/21/2019 Updated Objective Findings:  less palpably tender in the left shoulder although still palpably tender in the left supraspinatus TREATMENT:  
(In addition to Assessment/Re-Assessment sessions the following treatments were rendered) THERAPEUTIC EXERCISE: (25 minutes):  Exercises per grid below to improve mobility, strength and dynamic movement of arm - right to improve functional bending, lifting, carrying, reaching, catching and overhead activites. Required moderate visual, verbal, manual and tactile cues to promote proper body alignment, promote proper body posture and promote proper body mechanics. Progressed resistance, range, repetitions and complexity of movement as indicated. Date: 
3/21/2019 Activity/Exercise Parameters Isometric flexion, abduction, ER/IR, Extension  10 x's with education and hold for the left shoulder. UBE  8 mins 6 forward and 2 backward level 3.0 Upper trap shrugs  20 x's Scapular retractions  20 x's Against wall slides  10 x's Modalities ( 15 mins) left shoulder hot pack layered over the upper trapezius while seated with pillows under arms to decrease inflammation and pain in the shoulder. Skin was clear and intact.   IFC estim  david quadrapolar to the left shoulder to decrease pain and sensitivity. 24 millamps  15 mins MANUAL THERAPY: ( 13 minutes): To the upper trapezius region left and adjacent to the bicipital groove efflerage and petrissage. Treatment/Session Assessment: improved mobility and less palpable tenderness of the . · Response to Treatment: left shoulder high irritability, inflammation and severity. · Communication/Consultation:  HEP issued and reinforced isometrics. · Equipment provided today:  HEP . · Compliance with Program/Exercises: compliant all of the time. Recommendations/Intent for next treatment session: \"Next visit will focus on advancements to more challenging activities, reduction in assistance provided for left shoulder. \". 
 
[employer] Portal 
 
Total Treatment Billable Duration:  55 mins PT Patient Time In/Time Out Time In: 1521 Time Out: 1610 Steve Aguirre, LIONEL Future Appointments Date Time Provider Greta Mares 3/22/2019  9:50 AM Israel Carlisle PA [facility]  
3/25/2019  9:00 AM Celestino Evans PT [facility] SFD  
3/27/2019  8:15 AM Celestino Evans PT [facility]  
4/1/2019  9:00 AM Celestino Evans PT [facility]  
5/30/2019  9:00 AM PVF LAB [facility] PVD  
6/6/2019 10:30 AM Israel Carlisle PA [facility] P[facility]

## 2019-03-25 ENCOUNTER — HOSPITAL ENCOUNTER (OUTPATIENT)
Dept: PHYSICAL THERAPY | Age: 54
Discharge: HOME | End: 2019-03-25
Payer: COMMERCIAL

## 2019-03-25 PROCEDURE — 97110 THERAPEUTIC EXERCISES: CPT

## 2019-03-25 PROCEDURE — 97140 MANUAL THERAPY 1/> REGIONS: CPT

## 2019-03-25 PROCEDURE — 97014 ELECTRIC STIMULATION THERAPY: CPT

## 2019-03-25 NOTE — PROGRESS NOTES
Roque DICKINSON Antolinlalo : 1965 Primary: General Haley Secondary:  [facility] Center at [facility] [facility], [place], [facility] [address] [facility] [phone]   [phone] OUTPATIENT PHYSICAL THERAPY: Daily Treatment Note 3/25/2019 Pre-treatment Symptoms/Complaints: improved with focus on bicipital groove and tendon. Pain: Initial:   /10 in the left shoulder  Post Session:  10 continued pain in the left shoulder Medications Last Reviewed:  3/25/2019 Updated Objective Findings:  less palpably tender in the left shoulder although still palpably tender in the left bicipital groove shrinkage of bull's eye . TREATMENT:  
(In addition to Assessment/Re-Assessment sessions the following treatments were rendered) THERAPEUTIC EXERCISE: (25 minutes):  Exercises per grid below to improve mobility, strength and dynamic movement of arm - right to improve functional bending, lifting, carrying, reaching, catching and overhead activites. Required moderate visual, verbal, manual and tactile cues to promote proper body alignment, promote proper body posture and promote proper body mechanics. Progressed resistance, range, repetitions and complexity of movement as indicated. Date: 
3/25/2019 Activity/Exercise Parameters Isometric flexion, abduction, ER/IR, Extension  10 x's with education and hold for the left shoulder. UBE  8 mins 6 forward and 2 backward level 3.0 Upper trap shrugs  20 x's Scapular retractions  20 x's Against wall slides  10 x's Modalities ( 12 mins) left shoulder hot pack layered over the upper trapezius while seated with pillows under arms to decrease inflammation and pain in the shoulder. Skin was clear and intact.   IFC estim  hertz quadrapolar to the left shoulder to decrease pain and sensitivity. 24 millamps  12 mins MANUAL THERAPY: ( 13 minutes): To the upper trapezius region left and adjacent to the bicipital groove efflerage and petrissage trigger points to the infraspinatus and upper trapezius region. Treatment/Session Assessment: improved mobility, trigger points in the scapular and adjacent region. · Response to Treatment: left shoulder high irritability, inflammation and severity. · Communication/Consultation:  HEP issued and reinforced isometrics. · Equipment provided today:  HEP discussion  . · Compliance with Program/Exercises: compliant all of the time. Recommendations/Intent for next treatment session: \"Next visit will focus on advancements to more challenging activities, reduction in assistance provided for left shoulder. \". 
 
[facility] Information Portal 
 
Total Treatment Billable Duration:  50 mins PT Patient Time In/Time Out Time In: 0900 Time Out: 6804 Leyda Cortez PT Future Appointments Date Time Provider Greta Suzan 3/27/2019  8:15 AM LIONEL Charles  
4/1/2019  9:00 AM LIONEL CharlesD  
4/4/2019  3:30 PM Danny Carlisle PA [facility]  
5/30/2019  9:00 AM PVF LAB TILA [facility]  
6/6/2019 10:30 AM Danny Carlisle PA [facility]

## 2019-03-27 ENCOUNTER — HOSPITAL ENCOUNTER (OUTPATIENT)
Dept: PHYSICAL THERAPY | Age: 54
Discharge: HOME | End: 2019-03-27
Payer: COMMERCIAL

## 2019-03-27 PROCEDURE — 97035 APP MDLTY 1+ULTRASOUND EA 15: CPT

## 2019-03-27 PROCEDURE — 97014 ELECTRIC STIMULATION THERAPY: CPT

## 2019-03-27 PROCEDURE — 97140 MANUAL THERAPY 1/> REGIONS: CPT

## 2019-03-27 NOTE — PROGRESS NOTES
Yoav Santamaria : 1965 Primary: General Haley Secondary:  Therapy Center at [facility] [address], [address] [facility], [address] [phone]   [phone] OUTPATIENT PHYSICAL THERAPY: Daily Treatment Note 3/27/2019 Pre-treatment Symptoms/Complaints: Flare up of inflammation and pain in the left supraspinatus and bicipital groove. Pain: Initial:   7/10 in the left shoulder  Post Session:  6/10 continued pain in the left shoulder Medications Last Reviewed:  3/27/2019 Updated Objective Findings:  Increased irritation in the left shoulder. Palpably tender in the supraspinatus region. TREATMENT:  
(In addition to Assessment/Re-Assessment sessions the following treatments were rendered) THERAPEUTIC EXERCISE: (0 minutes):  Exercises per grid below to improve mobility, strength and dynamic movement of arm - right to improve functional bending, lifting, carrying, reaching, catching and overhead activites. Required moderate visual, verbal, manual and tactile cues to promote proper body alignment, promote proper body posture and promote proper body mechanics. Progressed resistance, range, repetitions and complexity of movement as indicated. Date: 
NONE TODAY secondary to inflammation Activity/Exercise Parameters Isometric flexion, abduction, ER/IR, Extension  10 x's with education and hold for the left shoulder. UBE  8 mins 6 forward and 2 backward level 3.0 Upper trap shrugs  20 x's Scapular retractions  20 x's Against wall slides  10 x's Modalities ( 15 mins) left shoulder hot pack layered over the upper trapezius while seated with pillows under arms to decrease inflammation and pain in the shoulder. Skin was clear and intact.   IFC estim  hertz quadrapolar to the left shoulder to decrease pain and sensitivity. 24 millamps  12 mins 8 mins ultrasound to the left shoulder supraspinatus and anterior shoulder pulsed 2 MHTZ 1.8 w/cm2 to increase mobility and decrease inflammation. MANUAL THERAPY: ( 30 minutes): To the upper trapezius region left and adjacent to the bicipital groove efflerage and petrissage trigger points to the infraspinatus and upper trapezius region, with patient in seated accupressure to the upper trap, supraspinatus, left scapular. Treatment/Session Assessment: increased inflammation and irritability coming to PT today,  Reports increased mobility post treatment with less pain, trigger points in the scapular and adjacent region, supraspinatus · Response to Treatment: left shoulder high irritability, inflammation and severity. · Communication/Consultation:  HEP has been issued · Equipment provided today:  HEP discussion  . · Compliance with Program/Exercises: compliant all of the time. Recommendations/Intent for next treatment session: \"Next visit will focus on advancements to more challenging activities, reduction in assistance provided for left shoulder.  inflammation. \". 
 
[employer] Portal 
 
Total Treatment Billable Duration:  53mins PT Patient Time In/Time Out Time In: 7331 Time Out: 0064 Mitra Cabrales, LIONEL Future Appointments Date Time Provider Greta Mares 2019  9:00 AM Rosario Life, PT [facility]  
4/3/2019 10:30 AM Rosario Life, PT [facility]  
2019  3:30 PM Luigi Carlisle PA [facility]  
2019  9:00 AM PVF LAB [facility]  
2019 10:30 AM Luigi Carlisle PA [facility]

## 2019-04-01 ENCOUNTER — HOSPITAL ENCOUNTER (OUTPATIENT)
Dept: PHYSICAL THERAPY | Age: 54
Discharge: HOME | End: 2019-04-01
Payer: COMMERCIAL

## 2019-04-01 PROCEDURE — 97140 MANUAL THERAPY 1/> REGIONS: CPT

## 2019-04-01 PROCEDURE — 97110 THERAPEUTIC EXERCISES: CPT

## 2019-04-01 PROCEDURE — 97014 ELECTRIC STIMULATION THERAPY: CPT

## 2019-04-01 NOTE — PROGRESS NOTES
Theresa Santamaria : 1965 Primary: General Haley Secondary:  Therapy Center at [facility] [address], [address] [facility], [address] [phone]   [phone] OUTPATIENT PHYSICAL THERAPY: Daily Treatment Note 2019 Pre-treatment Symptoms/Complaints: Less intensive pain today so back to original therapeutic exercise stabilization and strengthening. Pain: Initial:   4/10 in the left shoulder  Post Session:  3/10 continued pain in the left shoulder Medications Last Reviewed:  2019 Updated Objective Findings:  Less irritation in the left shoulder. Less palpably tender in the supraspinatus region. 2019 TREATMENT:  
(In addition to Assessment/Re-Assessment sessions the following treatments were rendered) THERAPEUTIC EXERCISE: ( 30 minutes):  Exercises per grid below to improve mobility, strength and dynamic movement of arm - right to improve functional bending, lifting, carrying, reaching, catching and overhead activites. Required moderate visual, verbal, manual and tactile cues to promote proper body alignment, promote proper body posture and promote proper body mechanics. Progressed resistance, range, repetitions and complexity of movement as indicated. Date: 
2019 Activity/Exercise Parameters Isometric flexion, abduction, ER/IR, Extension  15 x's with education and hold for the left shoulder. UBE  8 mins 6 forward and 2 backward level 3.0 Upper trap shrugs  20 x's Scapular retractions  20 x's Against wall slides  10 x's Modalities ( 15 mins) left shoulder hot pack layered over the upper trapezius while seated massage chair to decrease inflammation and pain in the shoulder. Skin was clear and intact.   IFC estim  hertz quadrapolar to the left shoulder to decrease pain and sensitivity. 24 millamps  12 mins MANUAL THERAPY: ( 10 minutes): To the upper trapezius region left and adjacent to the bicipital groove efflerage and petrissage trigger points to the infraspinatus and upper trapezius region, with patient in seated massage chair  accupressure to the upper trap, supraspinatus, left scapular. Treatment/Session Assessment: decreased inflammation and irritability coming to PT today,  Reports increased mobility post treatment with less pain, trigger points in the scapular and adjacent region, supraspinatus · Response to Treatment: left shoulder irritability, inflammation and severity. But less than prior treatment day. · Communication/Consultation:  HEP has been issued and resumed · Equipment provided today:  HEP discussion  . · Compliance with Program/Exercises: compliant all of the time. Recommendations/Intent for next treatment session: \"Next visit will focus on advancements to more challenging activities, reduction in assistance provided for left shoulder.  inflammation. \". 
 
[employer] Portal 
 
Total Treatment Billable Duration:  55 mins PT Patient Time In/Time Out Time In: 0900 Time Out: 6698 Melanie Jurado, LIONEL Future Appointments Date Time Provider Greta Mares 4/3/2019 10:30 AM Vivek Jara, PT [facility]  
2019  3:30 PM Nick Carlisle PA [facility]  
2019  9:00 AM Vivek Jara, PT [facility]  
4/10/2019 10:15 AM Vivek Jara PT [facility]  
4/15/2019  9:30 AM Vivek Jara, PT [facility]  
2019  8:45 AM Vivek Jara, PT [facility]  
2019  9:00 AM PV[facility] LAB TILA [facility]  
2019 10:30 AM Nick Carlisle PA [facility]

## 2019-04-03 ENCOUNTER — HOSPITAL ENCOUNTER (OUTPATIENT)
Dept: PHYSICAL THERAPY | Age: 54
Discharge: HOME | End: 2019-04-03
Payer: COMMERCIAL

## 2019-04-03 PROCEDURE — 97110 THERAPEUTIC EXERCISES: CPT

## 2019-04-03 PROCEDURE — 97014 ELECTRIC STIMULATION THERAPY: CPT

## 2019-04-03 PROCEDURE — 97140 MANUAL THERAPY 1/> REGIONS: CPT

## 2019-04-03 NOTE — PROGRESS NOTES
Lula Santamaria : 1965 Primary: General Haley Secondary:  Therapy Center at [facility] [address], [address] [facility]", [place] [address] [address] [facility] [phone]   [phone] OUTPATIENT PHYSICAL THERAPY: Daily Treatment Note 4/3/2019 Pre-treatment Symptoms/Complaints: Still inflammatory pain easily exacerbated moderat to high severity, irritability, and inflammation. Pain: Initial:   4/10 in the left shoulder and distal biceps. Post Session:  3/10 continued pain in the left shoulder Medications Last Reviewed:  4/3/2019 Updated Objective Findings:  Distinct palpable tenderness down the arm and shoulder. .     4/3/2019 TREATMENT:  
(In addition to Assessment/Re-Assessment sessions the following treatments were rendered) THERAPEUTIC EXERCISE: ( 30 minutes):  Exercises per grid below to improve mobility, strength and dynamic movement of arm - right to improve functional bending, lifting, carrying, reaching, catching and overhead activites. Required moderate visual, verbal, manual and tactile cues to promote proper body alignment, promote proper body posture and promote proper body mechanics. Progressed resistance, range, repetitions and complexity of movement as indicated. Date: 
4/3/2019 Activity/Exercise Parameters Isometric flexion, abduction, ER/IR, Extension  15 x's with education and hold for the left shoulder. UBE  8 mins 6 forward and 2 backward level 3.0 Upper trap shrugs  20 x's Scapular retractions  20 x's Against wall slides  10 x's Modalities ( 15 mins) left shoulder hot pack layered over the upper trapezius while seated massage chair to decrease inflammation and pain in the shoulder. Skin was clear and intact.   IFC estim  hertz quadrapolar to the left shoulder to decrease pain and sensitivity. 24 millamps  12 mins MANUAL THERAPY: ( 10 minutes): To the upper trapezius region left and adjacent to the bicipital groove efflerage and petrissage trigger points to the infraspinatus and upper trapezius region, with patient in prone and thoracic mobilization with accupressure to the upper trap, supraspinatus, left scapular. Treatment/Session Assessment: moderate inflammation and irritability coming to PT today,  Reports increased mobility post treatment with less pain, trigger points in the scapular and adjacent region, supraspinatus · Response to Treatment: moderate shoulder irritability, inflammation and severity. Limited mobility and ability to perform therapeutic exercise. · Communication/Consultation:  HEP has been issued and resumed · Equipment provided today:  HEP discussion  . · Compliance with Program/Exercises: compliant all of the time. Recommendations/Intent for next treatment session: \"Next visit will focus on advancements to more challenging activities, reduction in assistance provided for left shoulder.  inflammation. Return to MD/PA 2019 . Further imaging may be recommended along with possibility of further anti inflammatory and shoulder is still inflamed. \". 
 
[employer] Portal 
 
Total Treatment Billable Duration:  55 mins PT Patient Time In/Time Out Time In: 1030 Time Out: 1130 Edson Lyon PT Future Appointments Date Time Provider Greta Mares 2019  3:30 PM Genny Carlisle PA [facility]  
2019  9:00 AM Namita Perez, PT [facility]  
4/10/2019 10:15 AM Namita Perez PT [facility]  
4/15/2019  9:30 AM Namita Perez PT [facility]  
2019  8:45 AM Namita Perez PT [facility] S[facility]  
2019  9:00 AM [facility] 6/6/2019 10:30 AM Trace Carlisle Chi, PA [facility]

## 2019-04-08 ENCOUNTER — HOSPITAL ENCOUNTER (OUTPATIENT)
Dept: PHYSICAL THERAPY | Age: 54
Discharge: HOME | End: 2019-04-08
Payer: COMMERCIAL

## 2019-04-08 PROCEDURE — 97140 MANUAL THERAPY 1/> REGIONS: CPT

## 2019-04-08 PROCEDURE — 97110 THERAPEUTIC EXERCISES: CPT

## 2019-04-08 PROCEDURE — 97014 ELECTRIC STIMULATION THERAPY: CPT

## 2019-04-08 NOTE — PROGRESS NOTES
Evon DICKINSON Antolinlalo : 1965 Primary: General Haley Secondary:  Therapy Center at [facility] [address], [address] [facility], [address] [phone]   [phone] OUTPATIENT PHYSICAL THERAPY: Daily Treatment Note 2019 Pre-treatment Symptoms/Complaints: Still inflammatory pain easily exacerbated moderate to high severity, irritability, and inflammation. Fragile but improved today. Pain: Initial:   3/10 in the left shoulder and distal biceps. Post Session:  3-4/10 continued pain in the left shoulder Medications Last Reviewed:  2019 Updated Objective Findings:  Distinct palpable tenderness down the arm and shoulder. Post accupuncture patient has decreased pain in the arm. .     2019 TREATMENT:  
(In addition to Assessment/Re-Assessment sessions the following treatments were rendered) THERAPEUTIC EXERCISE: ( 30 minutes):  Exercises per grid below to improve mobility, strength and dynamic movement of arm - right to improve functional bending, lifting, carrying, reaching, catching and overhead activites. Required moderate visual, verbal, manual and tactile cues to promote proper body alignment, promote proper body posture and promote proper body mechanics. Progressed resistance, range, repetitions and complexity of movement as indicated. Date: 
2019 Activity/Exercise Parameters Isometric flexion, abduction, ER/IR, Extension  15 x's with education and hold for the left shoulder. UBE  8 mins 6 forward and 2 backward level 3.0 Upper trap shrugs  20 x's Scapular retractions  20 x's Against wall slides  10 x's Modalities ( 10 mins) left shoulder hot pack layered over the upper trapezius while seated massage chair to decrease inflammation and pain in the shoulder. Skin was clear and intact. IFC estim  hertz quadrapolar to the left shoulder to decrease pain and sensitivity. 24 millamps  12 mins MANUAL THERAPY: ( 10 minutes): To the upper trapezius region left and adjacent to the bicipital groove efflerage and petrissage trigger points to the infraspinatus and upper trapezius region, with patient in seated and thoracic mobilization with accupressure to the upper trap, supraspinatus, left scapular. Treatment/Session Assessment: moderate inflammation and irritability coming to PT today,  Reports increased mobility post treatment with less pain, trigger points in the scapular and adjacent region, supraspinatus · Response to Treatment: moderate shoulder irritability, inflammation and severity. Limited mobility and ability to perform therapeutic exercise. · Communication/Consultation:  HEP has been issued and resumed · Equipment provided today:  HEP discussion  . · Compliance with Program/Exercises: compliant all of the time. Recommendations/Intent for next treatment session: \"Next visit will focus on advancements to more challenging activities, reduction in assistance provided for left shoulder.  inflammation. Return to MD/PA 2019. Further imaging may be recommended along with possibility of further anti inflammatory and shoulder is still inflamed. \". 
[email or web address] Portal 
 
Total Treatment Billable Duration:  50 mins PT Patient Time In/Time Out Time In: 0900 Time Out: 6223 Cirilo Batista PT Future Appointments Date Time Provider Greta Mares 4/10/2019 10:15 AM Foss Remedies, PT [facility]  
4/15/2019  9:30 AM Padmini Remedies, PT [facility]  
2019  8:45 AM Padmini Remedies, PT [facility]  
2019  3:30 PM Lina Carlisle PA [facility]  
2019  9:00 AM PV[facility] LAB T[facility]A [facility] 6/6/2019 10:30 AM Que Carlisle PA [facility]

## 2019-04-10 ENCOUNTER — HOSPITAL ENCOUNTER (OUTPATIENT)
Dept: PHYSICAL THERAPY | Age: 54
Discharge: HOME | End: 2019-04-10
Payer: COMMERCIAL

## 2019-04-10 PROCEDURE — 97110 THERAPEUTIC EXERCISES: CPT

## 2019-04-10 PROCEDURE — 97014 ELECTRIC STIMULATION THERAPY: CPT

## 2019-04-10 PROCEDURE — 97140 MANUAL THERAPY 1/> REGIONS: CPT

## 2019-04-10 NOTE — PROGRESS NOTES
Harris DICKINOSN Bons : 1965 Primary: General Haley Secondary:  Therapy Center at [facility] [address], [address] [facility], [address] [phone]   [phone] OUTPATIENT PHYSICAL THERAPY: Daily Treatment Note 4/10/2019 Pre-treatment Symptoms/Complaints: Still inflammatory pain easily exacerbated moderate to high severity, irritability, and inflammation. Fragile but improved today and reports overhead ability unweighted without complaints of increased pain. Pain: Initial:   2/10 in the left shoulder and distal biceps. Post Session:  1-2/10 continued pain in the left shoulder Medications Last Reviewed:  4/10/2019 Updated Objective Findings:  Distinct palpable tenderness down the arm and shoulder. Post accupuncture patient has decreased pain in the arm and the shoulder. 4/10/2019  As per initial evaluation other findings. TREATMENT:  
(In addition to Assessment/Re-Assessment sessions the following treatments were rendered) THERAPEUTIC EXERCISE: ( 30 minutes):  Exercises per grid below to improve mobility, strength and dynamic movement of arm - right to improve functional bending, lifting, carrying, reaching, catching and overhead activites. Required moderate visual, verbal, manual and tactile cues to promote proper body alignment, promote proper body posture and promote proper body mechanics. Progressed resistance, range, repetitions and complexity of movement as indicated. Date: 
4/10/2019 Activity/Exercise Parameters Isometric flexion, abduction, ER/IR, Extension  15 x's with education and hold for the left shoulder. UBE  8 mins 6 forward and 2 backward level 3.0 Upper trap shrugs  20 x's Scapular retractions  20 x's Against wall slides  10 x's Upper trap side bends  5 mins Modalities ( 15 mins) left shoulder hot pack layered over the upper trapezius while seated massage chair to decrease inflammation and pain in the shoulder. Skin was clear and intact. IFC estim  hertz quadrapolar to the left shoulder to decrease pain and sensitivity. 24 millamps  15 mins MANUAL THERAPY: ( 10 minutes): To the upper trapezius region left and adjacent to the bicipital groove efflerage and petrissage trigger points to the infraspinatus and upper trapezius region, with patient in seated and thoracic mobilization with accupressure to the upper trap, supraspinatus, left scapular. Treatment/Session Assessment: modulated inflammation and irritability coming to PT today,  Reports increased mobility post treatment with less pain, trigger points in the scapular and adjacent region, supraspinatus. Reports best two days she has felt. Still out of work at this time. · Response to Treatment: moderate shoulder irritability, inflammation and severity. Limited mobility and ability to perform therapeutic exercise. · Communication/Consultation:  HEP has been issued and resumed · Equipment provided today:  HEP discussion  . · Compliance with Program/Exercises: compliant all of the time. Recommendations/Intent for next treatment session: \"Next visit will focus on advancements to more challenging activities, reduction in assistance provided for left shoulder.  inflammation. Return to MD/PA 2019. Further imaging may be recommended along with possibility of further anti inflammatory and shoulder is still inflamed. \". 
[employer] Portal 
 
Total Treatment Billable Duration:  55 mins PT Patient Time In/Time Out Time In: 6839 Time Out: 1115 Melanie Jurado PT Future Appointments Date Time Provider Greta Mares 4/15/2019  9:30 AM Vivek Jara PT [facility]  
 4/17/2019  8:45 AM Haydee Quach, PT [facility]  
4/24/2019  3:30 PM Kaylan Carlisle PA [facility]  
5/30/2019  9:00 AM PV[facility] LAB [facility]  
6/6/2019 10:30 AM Kaylan Carlisle PA [facility]

## 2019-04-15 ENCOUNTER — HOSPITAL ENCOUNTER (OUTPATIENT)
Dept: PHYSICAL THERAPY | Age: 54
Discharge: HOME | End: 2019-04-15
Payer: COMMERCIAL

## 2019-04-15 PROCEDURE — 97110 THERAPEUTIC EXERCISES: CPT

## 2019-04-15 PROCEDURE — 97140 MANUAL THERAPY 1/> REGIONS: CPT

## 2019-04-15 PROCEDURE — 97014 ELECTRIC STIMULATION THERAPY: CPT

## 2019-04-15 NOTE — PROGRESS NOTES
Dalton Díaz ALOK Santamaria : 1965 Primary: General [place]aley Secondary:  [facility]rapy [facility] at [facility] [address] [facility], [place], [facility] [address] [facility] [phone]   [phone] OUTPATIENT PHYSICAL THERAPY: Daily Treatment Note 4/15/2019 Pre-treatment Symptoms/Complaints: Still inflammatory pain easily exacerbated moderate to high severity, irritability, and inflammation. Tried housework and did irritate the shoulder but less palpable tenderness today. Pain: Initial:   2/10 in the left shoulder and distal biceps. Post Session:  1-2/10 continued pain in the left shoulder Medications Last Reviewed:  4/15/2019 Updated Objective Findings:  Distinct palpable tenderness down the arm and shoulder. Post accupuncture patient has decreased pain in the arm and the shoulder. 4/15/2019  As per initial evaluation other findings. TREATMENT:  
(In addition to Assessment/Re-Assessment sessions the following treatments were rendered) THERAPEUTIC EXERCISE: ( 30 minutes):  Exercises per grid below to improve mobility, strength and dynamic movement of arm - right to improve functional bending, lifting, carrying, reaching, catching and overhead activites. Required moderate visual, verbal, manual and tactile cues to promote proper body alignment, promote proper body posture and promote proper body mechanics. Progressed resistance, range, repetitions and complexity of movement as indicated. Date: 
4/15/2019 Activity/Exercise Parameters Isometric flexion, abduction, ER/IR, Extension  15 x's with education and hold for the left shoulder. UBE  8 mins 6 forward and 2 backward level 3.0 Upper trap shrugs  20 x's Scapular retractions  20 x's Against wall slides  10 x's Upper trap side bends  5 mins Modalities ( 15 mins) left shoulder hot pack layered over the upper trapezius while seated massage chair to decrease inflammation and pain in the shoulder. Skin was clear and intact. IFC estim  hertz quadrapolar to the left shoulder to decrease pain and sensitivity. 24 millamps  15 mins MANUAL THERAPY: ( 10 minutes): To the upper trapezius region left and adjacent to the bicipital groove efflerage and petrissage trigger points to the infraspinatus and upper trapezius region, with patient in seated and thoracic mobilization with accupressure to the upper trap, supraspinatus, left scapular. Treatment/Session Assessment: modulated inflammation and irritability coming to PT today,  Reports increased mobility post treatment with less pain, trigger points in the scapular and adjacent region, supraspinatus. Overhead elevation improved unweighted · Response to Treatment: moderate shoulder irritability, inflammation and severity. Limited mobility and ability to perform therapeutic exercise. · Communication/Consultation:  HEP has been issued and resumed · Equipment provided today:  HEP discussion  . · Compliance with Program/Exercises: compliant all of the time. Recommendations/Intent for next treatment session: \"Next visit will focus on advancements to more challenging activities, reduction in assistance provided for left shoulder.  inflammation. Return to MD/PA. Overall improving . \". [employer] Portal 
 
Total Treatment Billable Duration:  55 mins PT Patient Time In/Time Out Time In: 930 Time Out: 1030 Dianne Lindsey PT Future Appointments Date Time Provider Greta Mares 2019  8:45 AM Sharyle Gab, PT [facility]PT SFD  
2019  3:30 PM Kendra Carlisle PA SSA [facility] PVD  
2019  9:00 AM PVF LAB TILA [facility] P[facility]  
2019 10:30 AM Kendra Carlisle PA SSA [facility] P[facility]

## 2019-04-17 ENCOUNTER — HOSPITAL ENCOUNTER (OUTPATIENT)
Dept: PHYSICAL THERAPY | Age: 54
Discharge: HOME | End: 2019-04-17
Payer: COMMERCIAL

## 2019-04-17 PROCEDURE — 97140 MANUAL THERAPY 1/> REGIONS: CPT

## 2019-04-17 PROCEDURE — 97014 ELECTRIC STIMULATION THERAPY: CPT

## 2019-04-17 PROCEDURE — 97110 THERAPEUTIC EXERCISES: CPT

## 2019-04-17 NOTE — THERAPY EVALUATION
Evondeisy Santamaria : 1965 Primary: General [place] Secondary:  [facility] [address] [facility], [address] [phone]   [phone] OUTPATIENT PHYSICAL THERAPY:Progress Report 2019  
:ICD-10: Treatment Diagnosis: Pain in left shoulder (M25.512) Shoulder lesion, unspecified, left shoulder (M75.92) Stiffness of left shoulder, not elsewhere classified (M25.612) Precautions/Allergies:  
Amoxicillin MD Orders: PT evaluation and treatment  MEDICAL/REFERRING DIAGNOSIS: 
Pain in left shoulder [M25.512] Bursitis of left shoulder [M75.52] DATE OF ONSET: 2019 REFERRING PHYSICIAN: Richmond Allen* RETURN PHYSICIAN APPOINTMENT:  
Future Appointments Date Time Provider Greta Mares 2019  3:30 PM Leigha Carlisle PA SSA [facility] P[facility]  
2019  9:00 AM PVF LAB SSA [facility] P[facility]  
2019 10:30 AM Leigha Carlisle PA SSA [facility] P[facility] PROGRESS ASSESSMENT:  Ms. David Petersen PROGRESS NOTE 2019 IMPROVEMENT :   NONE TO MARGINAL. FURTHER MEDICAL CONSULTATION, IMAGING AND MODULATION INDICATED FOR SUCCESFUL OUTCOME IN PHYSICAL THERAPY. is a pleasant active female presenting with left shoulder bursitis and supraspinatus tendinitis. The condition continues to be severe, irritable and inflammatory with all but the most basic AROM and activities within central ROM. Her elevation ROM is limited and consistently provocative with  Pushing, pulling and tearing. The DASH has decreased two points to 34/50 (not substantial). Recent medrol injection was temporarily effective but the shoulder has since returned to prior status. She has been for accupuncture with limited relief. Impingement and supraspinatus testing is positive for possible lesion. DMII can also be a mitigating factor of healing and improvement.      Further medical consultation and imaging is indicated for this patient, as her progression toward return to normal activities has been marginal.     PT chart and potential continuation will remain open pending further medical consultation and imaging. Left AROM   STRENGTH   PROM  NORMALS Flexion                    130*        3+                 140*              160 ABDUCTION          120*         3+                130 *              160 Extension                60             4                  60                 60 
ER                              60            4                    65                 85  
IR                               L3             4                   50               T12 Skilled PT  Is indicated for this patient's functional mobility deficits. PROBLEM LIST (Impacting functional limitations): 1. Decreased Strength 2. Decreased ADL/Functional Activities 3. Decreased Activity Tolerance 4. Decreased Work Simplification/Energy Conservation Techniques 5. Decreased Flexibility/Joint Mobility 6. Decreased [place] with Home Exercise Program 
7. Decreased Shoulder ROM from inflammation, irritability, severity. INTERVENTIONS PLANNED :  
1. Manual Therapy 2. Therapeutic Exercise 3. Modalities Cold and Hot 4.         Ultrasound 5.         Unattended estim GOALS: (Goals have been discussed and agreed upon with patient.) Rehabilitation Potential For Stated Goals: Clifm Libman Time Frame: goals not fully met 4/17/2019 1. Decrease pain below 6/10 for the left shoulder with beginning home ADL's / shoulder mobility. 2. Decrease DASH from 30 in the left shoulder to indicate functional improvement and to demonstrate improved range of motion and functional improvement of the shoulder. 3. Patient to be independent with an initial HEP for the left shoulder for mobility and strengthening. DISCHARGE GOALS: goals not met    4/17/2019 1. Decrease pain below 3/10 for the left shoulder with home ADL's / shoulder mobility. 2. Decrease DASH to 20 in the left shoulder to indicate functional improvement and to demonstrate improved strength and mobility of the right shoulder 3. Patient to be independent with an advanced HEP for the left shoulder for mobility and strengthening. OUTCOME MEASURE:  
Tool Used: Disabilities of the Arm, Shoulder and Hand (DASH) Questionnaire - Quick Version Score:  Initial: 38/55  Most Recent: 34/55 (Date: 4/17/2019  ) Interpretation of Score: The DASH is designed to measure the activities of daily living in person's with upper extremity dysfunction or pain. Each section is scored on a 1-5 scale, 5 representing the greatest disability. The scores of each section are added together for a total score of 55. MEDICAL NECESSITY:  
· Patient demonstrates fair rehab potential due to higher previous functional level. and currently highly inflammatory, irritable and severe REASON FOR SERVICES/OTHER COMMENTS: 
· Patient continues to require skilled intervention due to medical complications and and irritability and inflammation of the left shoulder . Total Duration: 
  
 
Rehabilitation Potential For Stated Goals: Fair Regarding Yuan Santamaria's therapy, I certify that the treatment plan above will be carried out by a therapist or under their direction. Thank you for this referral, Erin Alvarez, PT Referring Physician Signature: Nikko Luciano PAIN/SUBJECTIVE:  
Initial:   7/10 left shoulder  Post Session:  6-7/10 in the left shoulder HISTORY:  
History of Injury/Illness (Reason for Referral): PER MD 3/11/2019 OTIS Gabbie Castanon is a 48 y.o. female who presents in the office today for 
  
    
Chief Complaint Patient presents with  Follow-up  
    Follow-up bursitis left shoulder - pain migrating down  
  
 Left shoulder pain onset around 2/18/19. No injury. She has pain in the front of her left shoulder and on the posterior side as well. She is right hand dominant. Shoulder was injected on 2/25/19 w/ Depomedrol + Lidocaine. Injection gave her partial relief for only 3 days. Pain is worsening. Seems to be aggravated by her work  (works at [employer], hanging up clothes all day). Now pain migrating down left arm. No numbness/tingling or weakness in arm. 
  
 
PER PT 3/18/2019: This kind patient demonstrates pain in the left shoulder with pushing more than pulling and pain with elevation with increased shoulder pain. She reports that the pain in the shoulder is intense with high irritability, inflammation and severity even after a steroid injection. She has been out of work for a couple of weeks and is looking at potentially another one. The pain is bullseye palpable in the left supraspinatus and bursae region. It increases with palpation. She also demonstrates tenderness in the left shoulder and anterior bicipital groove region. She is unable to lift or push without pain. Past Medical History/Comorbidities: Ms. Neema Armstrong  has a past medical history of Adverse effect of anesthesia, Calculus of ureter, Depression, Diabetes ([address] Headache, Hypertension, Hypothyroidism, and Kidney stone. Ms. Neema Armstrong  has a past surgical history that includes hx breast reduction (1999); hx heent; fragment kidney stone/ eswl; hx trabeculectomy (2011); hx colonoscopy (2017); and hx dilation and curettage (10/2017). Social History/living Environment:  
  no limitations Prior Level of Function/Work/Activity: 
Had been independent with all functional mobility and working at [employer]. Dominant Side:  
      RIGHT Personal Factors:   
      Sex:  female Age:  48 y.o. Ambulatory/Rehab Services H2 Model Falls Risk Assessment Risk Factors: No Risk Factors Identified Ability to Rise from Chair: 
     (0)  Ability to rise in a single movement Falls Prevention Plan: No modifications necessary Total: (5 or greater = High Risk): 0  
©2010 [employer] [facility] [employer] Patent #[id number]. Federal Law prohibits the replication, distribution or use without written permission from [employer] of [facility] Current Medications:   
  
Current Outpatient Medications:   cyclobenzaprine (FLEXERIL) 5 mg tablet, Take 1 Tab by mouth nightly., Disp: 30 Tab, Rfl: 0 
  naproxen (NAPROSYN) 500 mg tablet, Take 1 Tab by mouth two (2) times daily (with meals). , Disp: 60 Tab, Rfl: 0 
  escitalopram oxalate (LEXAPRO) 10 mg tablet, Take 1 Tab by mouth nightly., Disp: 30 Tab, Rfl: 5 
  lisinopril (PRINIVIL, ZESTRIL) 5 mg tablet, Take 1 Tab by mouth nightly., Disp: 30 Tab, Rfl: 5 
  metFORMIN (GLUCOPHAGE) 500 mg tablet, Take 1 Tab by mouth nightly., Disp: 30 Tab, Rfl: 5 
  exenatide microspheres (BYDUREON BCISE) 2 mg/0.85 mL atIn, Inject once weekly, Disp: 4 Syringe, Rfl: 5 
  topiramate (TOPAMAX) 50 mg tablet, 1 po at HS, Disp: 30 Tab, Rfl: 5 
  SYNTHROID 50 mcg tablet, Take 1 Tab by mouth Daily (before breakfast). ANDREA, Disp: 30 Tab, Rfl: 11 
  raNITIdine (ZANTAC) 150 mg tablet, Take 1 Tab by mouth two (2) times daily as needed for Indigestion. , Disp: 60 Tab, Rfl: 5 
  Flaxseed Oil oil, by Does Not Apply route., Disp: , Rfl:   
Date Last Reviewed:  4/17/2019 Number of Personal Factors/Comorbidities that affect the Plan of Care: 3+: HIGH COMPLEXITY EXAMINATION:  
                                                            Left AROM   STRENGTH   PROM  NORMALS Flexion                    130*        3+                 140*              160 ABDUCTION          120*         3+                130 *             =160 Extension                60             4                  60 60 
ER                              60            4                    65                  85  
IR                               L3             4                   50                 T12 Skilled PT  Is indicated for this patient's functional mobility deficits. Strength:See Above Special Tests:    
(+) supraspinatus (+) impingement (+) painful arc Neurological Screen: Myotomes: intact. Dermatomes: intact bilateral UE's light touch and proprioception. Reflexes: normal 
       
Functional Mobility: intact Balance: intact Body Structures Involved: 1. Bones 2. Joints 3. Muscles 4. Ligaments Body Functions Affected: 1. Neuromusculoskeletal 
2. Movement Related 3. Skin Related 4. Metobolic/Endocrine Activities and Participation Affected: 1. General Tasks and Demands 2. Communication 3. Mobility 4. Self Care 5. Domestic Life 6. Community, Social and [place] Number of elements (examined above) that affect the Plan of Care: 4+: HIGH COMPLEXITY CLINICAL PRESENTATION:  
Presentation: Evolving clinical presentation with unstable and unpredictable characteristics: HIGH COMPLEXITY CLINICAL DECISION MAKING:  
Use of outcome tool(s) and clinical judgement create a POC that gives a: Difficult prediction of patient's progress: HIGH COMPLEXITY [facility]

## 2019-04-17 NOTE — PROGRESS NOTES
Kenneth Sindy ALOK Santamaria : 1965 Primary: General [place]aley Secondary:  [facility] [address], [address], [place], [facility] [address] [facility] [phone]   [phone] OUTPATIENT PHYSICAL THERAPY: Daily Treatment Note 2019 Pre-treatment Symptoms/Complaints: Still inflammatory pain easily exacerbated moderate to high severity, irritability, and inflammation. All activities involving pushing, pulling or tearing are provocative. Pain: Initial:   7/10 in the left shoulder and distal biceps. Post Session: -7/10 continued pain in the left shoulder Medications Last Reviewed:  2019 Updated Objective Findings:  Distinct palpable tenderness down the arm and shoulder. Post accupuncture patient has decreased pain in the arm and the shoulder. 2019  As per initial evaluation other findings. TREATMENT:  
(In addition to Assessment/Re-Assessment sessions the following treatments were rendered) THERAPEUTIC EXERCISE: ( 30 minutes):  Exercises per grid below to improve mobility, strength and dynamic movement of arm - right to improve functional bending, lifting, carrying, reaching, catching and overhead activites. Required moderate visual, verbal, manual and tactile cues to promote proper body alignment, promote proper body posture and promote proper body mechanics. Progressed resistance, range, repetitions and complexity of movement as indicated. Progress note :   Objective findings per progress note. Patient education and modulation. Date: Activity/Exercise Parameters Isometric flexion, abduction, ER/IR, Extension UBE Upper trap shrugs Scapular retractions Against wall slides Upper trap side bends Modalities ( 15 mins) left shoulder cold pack layered over the upper and shoulder while seated massage chair to decrease inflammation and pain in the shoulder. Skin was clear and intact. IFC estim  hertz quadrapolar to the left shoulder to decrease pain and sensitivity. 24 millamps  15 mins MANUAL THERAPY: ( 10 minutes): To the upper trapezius region left and adjacent to the bicipital groove efflerage and petrissage trigger points to the infraspinatus and upper trapezius region, with patient in seated and thoracic mobilization with accupressure to the upper trap, supraspinatus, left scapular. Treatment/Session Assessment: increased inflammation and irritability coming to PT today, · Response to Treatment: moderate shoulder irritability, inflammation and severity. Limited mobility and ability to perform therapeutic exercise. · Communication/Consultation:  HEP has been issued and resumed · Equipment provided today:  HEP discussion  . · Compliance with Program/Exercises: compliant all of the time. Recommendations/Intent for next treatment session: return to MD please advise. [employer] Portal 
 
Total Treatment Billable Duration:  55 mins PT Patient Time In/Time Out Time In: 0845 Time Out: 4960 Alirio Henry PT Future Appointments Date Time Provider Greta Mares 4/24/2019  3:30 PM Yesenia Carlisle PA SSA [facility] P[facility]  
5/30/2019  9:00 AM PVF LAB TILA [facility]F P[facility]  
6/6/2019 10:30 AM Yesenia Carlisle PA SSA [facility] P[facility]

## 2019-05-04 ENCOUNTER — HOSPITAL ENCOUNTER (OUTPATIENT)
Dept: MRI IMAGING | Age: 54
Discharge: HOME | End: 2019-05-04
Payer: COMMERCIAL

## 2019-05-04 DIAGNOSIS — M75.102 ROTATOR CUFF SYNDROME OF LEFT SHOULDER: ICD-10-CM

## 2019-05-04 PROCEDURE — 73221 MRI JOINT UPR EXTREM W/O DYE: CPT

## 2019-05-08 PROBLEM — E66.01 SEVERE OBESITY (HCC): Status: ACTIVE | Noted: 2019-05-08

## 2019-05-13 NOTE — THERAPY DISCHARGE
Yanelis Saenz has been seen in physical therapy from 3/18/2019 to 4/17/2019  for 10 visits. Treatment has been discontinued at this time due to referral on to Dr. Haydee Leggett post MRI . Some goals were not met due to marginal progression of improvement. Thank you for this referral.   Tami Soriano, PT, DPT, OCS.

## 2019-05-16 VITALS — HEIGHT: 62 IN | WEIGHT: 201 LBS | BODY MASS INDEX: 36.99 KG/M2

## 2019-05-16 RX ORDER — SODIUM CHLORIDE 0.9 % (FLUSH) 0.9 %
5-40 SYRINGE (ML) INJECTION EVERY 8 HOURS
Status: CANCELLED | OUTPATIENT
Start: 2019-05-16

## 2019-05-16 RX ORDER — SODIUM CHLORIDE 0.9 % (FLUSH) 0.9 %
5-40 SYRINGE (ML) INJECTION AS NEEDED
Status: CANCELLED | OUTPATIENT
Start: 2019-05-16

## 2019-05-16 RX ORDER — CEFAZOLIN SODIUM/WATER 2 G/20 ML
2 SYRINGE (ML) INTRAVENOUS ONCE
Status: CANCELLED | OUTPATIENT
Start: 2019-05-16 | End: 2019-05-16

## 2019-05-16 NOTE — PERIOP NOTES
Patient verified name and . Order for consent not found in EHR, cannot verify it  matches case posting; patient verifies procedure. Type 1B surgery, Phone assessment complete. Orders not yet received. Labs per surgeon: no orders. Labs per anesthesia protocol: POC Hcg, POC FBS. Patient answered medical/surgical history questions at their best of ability. All prior to admission medications documented in [facility]. Patient instructed to take the following medications the day of surgery according to anesthesia guidelines with a small sip of water: synthroid, zantac. Easydiagnosismaury Valdez Hold all vitamins 7 days prior to surgery and NSAIDS 5 days prior to surgery. Prescription meds to hold: Toradol- 5 days. Patient instructed on the following:  Arrive at A Entrance, time of arrival to be called the day before by 1700  NPO after midnight including gum, mints, and ice chips  Responsible adult must drive patient to the hospital, stay during surgery, and patient will need supervision 24 hours after anesthesia  Use dial soap in shower the night before surgery and on the morning of surgery  All piercings must be removed prior to arrival.    Leave all valuables (money and jewelry) at home but bring insurance card and ID on       DOS. Do not wear make-up, nail polish, lotions, cologne, perfumes, powders, or oil on skin. Patient teach back successful and patient demonstrates knowledge of instruction.

## 2019-05-17 ENCOUNTER — HOSPITAL ENCOUNTER (OUTPATIENT)
Dept: SURGERY | Age: 54
Discharge: HOME | End: 2019-05-17

## 2019-05-18 PROBLEM — M75.22 BICIPITAL TENDINITIS OF LEFT SHOULDER: Status: ACTIVE | Noted: 2019-05-18

## 2019-05-18 PROBLEM — M75.02 ADHESIVE CAPSULITIS OF LEFT SHOULDER: Status: ACTIVE | Noted: 2019-05-18

## 2019-05-18 PROBLEM — S43.432A SUPERIOR GLENOID LABRUM LESION OF LEFT SHOULDER: Status: ACTIVE | Noted: 2019-05-18

## 2019-05-18 PROBLEM — M19.012 DEGENERATIVE JOINT DISEASE OF LEFT ACROMIOCLAVICULAR JOINT: Status: ACTIVE | Noted: 2019-05-18

## 2019-05-18 NOTE — H&P
[facility] HISTORY AND PHYSICAL Subjective:  
 
Patient is a 48 y.o. RHD FEMALE WITH LEFT SHOULDER PAIN. SEE OFFICE NOTE. Patient Active Problem List  
 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 Rios 08/19/2015  Controlled type 2 diabetes mellitus without complication, without long-term current use of insulin ([address]  Hypothyroidism, adult  History of kidney stones Past Medical History:  
Diagnosis Date  Adverse effect of anesthesia   
 delayed awakening; pt reports also teary post-op  Calculus of ureter  Depression   
 managed with medication  Diabetes ([address] type 2, oral med and non-insulin injectable, does not check BG at home, a1c- 5.7 (6/12/18)  Headache  Hypertension   
 managed with medication  Hypothyroidism   
 controlled with synthroid  Kidney stone Past Surgical History:  
Procedure Laterality Date  FRAGMENT KIDNEY STONE/ ESWL    
 HX BREAST REDUCTION  1999  HX COLONOSCOPY  2017  HX DILATION AND CURETTAGE  10/2017  
 endometrial ablation  HX HEENT    
 GUM SURGURY  
 HX HEENT  2018  
 sinus  HX TRABECULECTOMY  2011 Dr. Augusto Gunn Prior to Admission medications Medication Sig Start Date End Date Taking? Authorizing Provider  
ketorolac (TORADOL) 10 mg tablet Take 1 Tab by mouth three (3) times daily (after meals). Indications: Severe Pain 5/8/19   Karl Carlisle PA  
cyclobenzaprine (FLEXERIL) 5 mg tablet Take 1 Tab by mouth nightly. Patient not taking: Reported on 5/16/2019 4/4/19   SHAHAB Taylor  
escitalopram oxalate (LEXAPRO) 10 mg tablet Take 1 Tab by mouth nightly. 12/13/18   Karl Carlisle PA  
lisinopril (PRINIVIL, ZESTRIL) 5 mg tablet Take 1 Tab by mouth nightly. 12/13/18   Karl Carlisle PA  
metFORMIN (GLUCOPHAGE) 500 mg tablet Take 1 Tab by mouth nightly. 12/13/18   Karl Carlisle PA  
exenatide microspheres (BYDUREON BCISE) 2 mg/0.85 mL atIn Inject once weekly 12/13/18   Karl Carlisle PA  
topiramate (TOPAMAX) 50 mg tablet 1 po at HS Patient taking differently: 1 po at HS  Indications: Migraine Prevention 12/13/18   Karl Carlisle PA  
SYNTHROID 50 mcg tablet Take 1 Tab by mouth Daily (before breakfast). ANDREA 12/13/18   Karl Carlisle PA  
raNITIdine (ZANTAC) 150 mg tablet Take 1 Tab by mouth two (2) times daily as needed for Indigestion. 12/13/18   SHAHAB Taylor Flaxseed Oil oil by Does Not Apply route. Provider, Historical  
 
Allergies Allergen Reactions  Amoxicillin Rash Social History Tobacco Use  Smoking status: Never Smoker  Smokeless tobacco: Never Used Substance Use Topics  Alcohol use: Yes Comment: 1-2 a month Family History Problem Relation Age of Onset  Diabetes Mother MELITUS  Diabetes Father MELITUS  Hypertension Father  High Cholesterol Other Review of Systems A comprehensive review of systems was negative except for that written in the HPI. Objective:  
 
No data found. There were no vitals taken for this visit. General:  Alert, cooperative, no distress, appears stated age. Head:  Normocephalic, without obvious abnormality, atraumatic. Back:   Symmetric, no curvature. ROM normal. No CVA tenderness. Lungs:   Clear to auscultation bilaterally. Chest wall:  No tenderness or deformity. Heart:  Regular rate and rhythm, S1, S2 normal, no murmur, click, rub or gallop. Extremities: Extremities normal, atraumatic, no cyanosis or edema. Pulses: 2+ and symmetric all extremities. Skin: Skin color, texture, turgor normal. No rashes or lesions. Lymph nodes: Cervical, supraclavicular, and axillary nodes normal.  
Neurologic: CNII-XII intact. Normal strength, sensation and reflexes throughout. Assessment:  
 
Principal Problem: 
  Adhesive capsulitis of left shoulder (5/18/2019) Active Problems: 
  Degenerative joint disease of left acromioclavicular joint (5/18/2019) Bicipital tendinitis of left shoulder (5/18/2019) Superior glenoid labrum lesion of left shoulder (5/18/2019) Plan: The various methods of treatment have been discussed with the patient and family. PATIENT HAS EXHAUSTED NON-OPERATIVE MODALITIES After consideration of risks, benefits and other options for treatment, the patient has consented to surgical intervention. SEE OFFICE NOTE Saad Barnett MD

## 2019-05-18 NOTE — BRIEF OP NOTE
BRIEF OPERATIVE NOTE    Date of Procedure: 5/24/2019     Preoperative Diagnosis:  ADHESIVE CAPSULITIS LEFT SHOULDER      BICEPS TENDINITIS LEFT SHOULDER      SLAP TEAR LEFT SHOULDER      AC OA LEFT SHOULDER    Postoperative Diagnosis:  ROTATOR CUFF TENDINITIS LEFT SHOULDER      SLAP TEAR LEFT SHOULDER      AC OA LEFT SHOULDER    Procedure(s): ARTHROSCOPY LEFT SHOULDER ARTHROSCOPIC SUBACROMIAL DECOMPRESSION, DISTAL CLAVICLE RESECTION, EXTENSIVE DEBRIDEMENT SLAP TEAR, GLENOHUMERAL JOINT, SUBACROMIAL SPACE    Surgeon(s) and Role:     * Ivonne Garcia MD - Primary         Assistant Staff:  Dontrell Peoples CFA      Surgical Staff:  Circ-1: (Unknown)  Scrub Tech-1: (Unknown)  Scrub Tech-2: (Unknown)  Scrub Tech-3: (Unknown)  No case tracking events are documented in the log. Anesthesia:  GENERAL WITH INTERSCALENE BLOCK    Estimated Blood Loss: 10 CC.             Complications: Katja Alvarez MD

## 2019-05-23 ENCOUNTER — ANESTHESIA EVENT (OUTPATIENT)
Dept: SURGERY | Age: 54
End: 2019-05-23
Payer: COMMERCIAL

## 2019-05-24 ENCOUNTER — APPOINTMENT (OUTPATIENT)
Dept: GENERAL RADIOLOGY | Age: 54
End: 2019-05-24
Attending: ORTHOPAEDIC SURGERY
Payer: COMMERCIAL

## 2019-05-24 ENCOUNTER — ANESTHESIA (OUTPATIENT)
Dept: SURGERY | Age: 54
End: 2019-05-24
Payer: COMMERCIAL

## 2019-05-24 ENCOUNTER — HOSPITAL ENCOUNTER (OUTPATIENT)
Age: 54
Setting detail: OUTPATIENT SURGERY
Discharge: HOME | End: 2019-05-24
Attending: ORTHOPAEDIC SURGERY | Admitting: ORTHOPAEDIC SURGERY
Payer: COMMERCIAL

## 2019-05-24 VITALS
OXYGEN SATURATION: 93 % | TEMPERATURE: 98.1 F | DIASTOLIC BLOOD PRESSURE: 61 MMHG | SYSTOLIC BLOOD PRESSURE: 115 MMHG | HEART RATE: 94 BPM | HEIGHT: 62 IN | BODY MASS INDEX: 36.91 KG/M2 | WEIGHT: 200.6 LBS | RESPIRATION RATE: 16 BRPM

## 2019-05-24 PROBLEM — M75.02 ADHESIVE CAPSULITIS OF LEFT SHOULDER: Status: RESOLVED | Noted: 2019-05-18 | Resolved: 2019-05-24

## 2019-05-24 PROBLEM — M75.22 BICIPITAL TENDINITIS OF LEFT SHOULDER: Status: RESOLVED | Noted: 2019-05-18 | Resolved: 2019-05-24

## 2019-05-24 PROBLEM — M75.52 BURSITIS OF LEFT SHOULDER: Status: ACTIVE | Noted: 2019-05-24

## 2019-05-24 LAB
EST. AVERAGE GLUCOSE BLD GHB EST-MCNC: 134 MG/DL
GLUCOSE BLD STRIP.AUTO-MCNC: 108 MG/DL (ref 65–100)
HBA1C MFR BLD: 6.3 %
HCG UR QL: NEGATIVE

## 2019-05-24 PROCEDURE — 74011250636 HC RX REV CODE- 250/636: Performed by: ANESTHESIOLOGY

## 2019-05-24 PROCEDURE — A4565 SLINGS: HCPCS | Performed by: ORTHOPAEDIC SURGERY

## 2019-05-24 PROCEDURE — 76060000033 HC ANESTHESIA 1 TO 1.5 HR: Performed by: ORTHOPAEDIC SURGERY

## 2019-05-24 PROCEDURE — 76010010054 HC POST OP PAIN BLOCK: Performed by: ORTHOPAEDIC SURGERY

## 2019-05-24 PROCEDURE — 76010000160 HC OR TIME 0.5 TO 1 HR INTENSV-TIER 1: Performed by: ORTHOPAEDIC SURGERY

## 2019-05-24 PROCEDURE — 77030039425 HC BLD LARYNG TRULITE DISP TELE -A: Performed by: ANESTHESIOLOGY

## 2019-05-24 PROCEDURE — 73030 X-RAY EXAM OF SHOULDER: CPT

## 2019-05-24 PROCEDURE — 94664 DEMO&/EVAL PT USE INHALER: CPT

## 2019-05-24 PROCEDURE — 74011250637 HC RX REV CODE- 250/637: Performed by: ANESTHESIOLOGY

## 2019-05-24 PROCEDURE — 74011250636 HC RX REV CODE- 250/636

## 2019-05-24 PROCEDURE — 77030018836 HC SOL IRR NACL ICUM -A: Performed by: ORTHOPAEDIC SURGERY

## 2019-05-24 PROCEDURE — 77030027384 HC PRB ARTHSCP SERFAS STRY -C: Performed by: ORTHOPAEDIC SURGERY

## 2019-05-24 PROCEDURE — 77030037088 HC TUBE ENDOTRACH ORAL NSL COVD-A: Performed by: ANESTHESIOLOGY

## 2019-05-24 PROCEDURE — 77030033073 HC TBNG ARTHSC PMP OUTFLO STRY -B: Performed by: ORTHOPAEDIC SURGERY

## 2019-05-24 PROCEDURE — 77030003666 HC NDL SPINAL BD -A: Performed by: ORTHOPAEDIC SURGERY

## 2019-05-24 PROCEDURE — 77030006668 HC BLD SHV MENSCS STRY -B: Performed by: ORTHOPAEDIC SURGERY

## 2019-05-24 PROCEDURE — 77030002916 HC SUT ETHLN J&J -A: Performed by: ORTHOPAEDIC SURGERY

## 2019-05-24 PROCEDURE — 83036 HEMOGLOBIN GLYCOSYLATED A1C: CPT

## 2019-05-24 PROCEDURE — 74011250636 HC RX REV CODE- 250/636: Performed by: ORTHOPAEDIC SURGERY

## 2019-05-24 PROCEDURE — 74011000250 HC RX REV CODE- 250: Performed by: ANESTHESIOLOGY

## 2019-05-24 PROCEDURE — 76210000021 HC REC RM PH II 0.5 TO 1 HR: Performed by: ORTHOPAEDIC SURGERY

## 2019-05-24 PROCEDURE — 77030016570 HC BLNKT BAIR HGGR 3M -B: Performed by: ANESTHESIOLOGY

## 2019-05-24 PROCEDURE — 74011000250 HC RX REV CODE- 250: Performed by: ORTHOPAEDIC SURGERY

## 2019-05-24 PROCEDURE — 76210000006 HC OR PH I REC 0.5 TO 1 HR: Performed by: ORTHOPAEDIC SURGERY

## 2019-05-24 PROCEDURE — 77030018673: Performed by: ORTHOPAEDIC SURGERY

## 2019-05-24 PROCEDURE — 77030006891 HC BLD SHV RESECT STRY -B: Performed by: ORTHOPAEDIC SURGERY

## 2019-05-24 PROCEDURE — 81025 URINE PREGNANCY TEST: CPT

## 2019-05-24 PROCEDURE — 77030003602 HC NDL NRV BLK BBMI -B: Performed by: ANESTHESIOLOGY

## 2019-05-24 PROCEDURE — 82962 GLUCOSE BLOOD TEST: CPT

## 2019-05-24 PROCEDURE — 77030034849: Performed by: ORTHOPAEDIC SURGERY

## 2019-05-24 PROCEDURE — 77030033005 HC TBNG ARTHSC PMP STRY -B: Performed by: ORTHOPAEDIC SURGERY

## 2019-05-24 PROCEDURE — 76942 ECHO GUIDE FOR BIOPSY: CPT | Performed by: ORTHOPAEDIC SURGERY

## 2019-05-24 PROCEDURE — 94640 AIRWAY INHALATION TREATMENT: CPT

## 2019-05-24 PROCEDURE — 74011000250 HC RX REV CODE- 250

## 2019-05-24 PROCEDURE — 77030004453 HC BUR SHV STRY -B: Performed by: ORTHOPAEDIC SURGERY

## 2019-05-24 PROCEDURE — 77030012935 HC DRSG AQUACEL BMS -B: Performed by: ORTHOPAEDIC SURGERY

## 2019-05-24 RX ORDER — ROCURONIUM BROMIDE 10 MG/ML
INJECTION, SOLUTION INTRAVENOUS AS NEEDED
Status: DISCONTINUED | OUTPATIENT
Start: 2019-05-24 | End: 2019-05-24 | Stop reason: HOSPADM

## 2019-05-24 RX ORDER — SODIUM CHLORIDE 0.9 % (FLUSH) 0.9 %
5-40 SYRINGE (ML) INJECTION AS NEEDED
Status: DISCONTINUED | OUTPATIENT
Start: 2019-05-24 | End: 2019-05-24 | Stop reason: HOSPADM

## 2019-05-24 RX ORDER — SODIUM CHLORIDE 0.9 % (FLUSH) 0.9 %
5-40 SYRINGE (ML) INJECTION EVERY 8 HOURS
Status: DISCONTINUED | OUTPATIENT
Start: 2019-05-24 | End: 2019-05-24 | Stop reason: HOSPADM

## 2019-05-24 RX ORDER — SODIUM CHLORIDE, SODIUM LACTATE, POTASSIUM CHLORIDE, CALCIUM CHLORIDE 600; 310; 30; 20 MG/100ML; MG/100ML; MG/100ML; MG/100ML
100 INJECTION, SOLUTION INTRAVENOUS CONTINUOUS
Status: DISCONTINUED | OUTPATIENT
Start: 2019-05-24 | End: 2019-05-24 | Stop reason: HOSPADM

## 2019-05-24 RX ORDER — DEXAMETHASONE SODIUM PHOSPHATE 4 MG/ML
INJECTION, SOLUTION INTRA-ARTICULAR; INTRALESIONAL; INTRAMUSCULAR; INTRAVENOUS; SOFT TISSUE AS NEEDED
Status: DISCONTINUED | OUTPATIENT
Start: 2019-05-24 | End: 2019-05-24 | Stop reason: HOSPADM

## 2019-05-24 RX ORDER — EPHEDRINE SULFATE 50 MG/ML
INJECTION, SOLUTION INTRAVENOUS AS NEEDED
Status: DISCONTINUED | OUTPATIENT
Start: 2019-05-24 | End: 2019-05-24 | Stop reason: HOSPADM

## 2019-05-24 RX ORDER — CEFAZOLIN SODIUM/WATER 2 G/20 ML
2 SYRINGE (ML) INTRAVENOUS ONCE
Status: COMPLETED | OUTPATIENT
Start: 2019-05-24 | End: 2019-05-24

## 2019-05-24 RX ORDER — FENTANYL CITRATE 50 UG/ML
INJECTION, SOLUTION INTRAMUSCULAR; INTRAVENOUS AS NEEDED
Status: DISCONTINUED | OUTPATIENT
Start: 2019-05-24 | End: 2019-05-24 | Stop reason: HOSPADM

## 2019-05-24 RX ORDER — FLUMAZENIL 0.1 MG/ML
0.2 INJECTION INTRAVENOUS
Status: DISCONTINUED | OUTPATIENT
Start: 2019-05-24 | End: 2019-05-24 | Stop reason: HOSPADM

## 2019-05-24 RX ORDER — ACETAMINOPHEN 500 MG
1000 TABLET ORAL ONCE
Status: DISCONTINUED | OUTPATIENT
Start: 2019-05-24 | End: 2019-05-24 | Stop reason: HOSPADM

## 2019-05-24 RX ORDER — OXYCODONE HYDROCHLORIDE 5 MG/1
10 TABLET ORAL
Status: DISCONTINUED | OUTPATIENT
Start: 2019-05-24 | End: 2019-05-24 | Stop reason: HOSPADM

## 2019-05-24 RX ORDER — NALOXONE HYDROCHLORIDE 0.4 MG/ML
0.2 INJECTION, SOLUTION INTRAMUSCULAR; INTRAVENOUS; SUBCUTANEOUS AS NEEDED
Status: DISCONTINUED | OUTPATIENT
Start: 2019-05-24 | End: 2019-05-24 | Stop reason: HOSPADM

## 2019-05-24 RX ORDER — MIDAZOLAM HYDROCHLORIDE 1 MG/ML
2 INJECTION, SOLUTION INTRAMUSCULAR; INTRAVENOUS
Status: DISCONTINUED | OUTPATIENT
Start: 2019-05-24 | End: 2019-05-24 | Stop reason: HOSPADM

## 2019-05-24 RX ORDER — LEVALBUTEROL INHALATION SOLUTION 1.25 MG/3ML
1.25 SOLUTION RESPIRATORY (INHALATION)
Status: COMPLETED | OUTPATIENT
Start: 2019-05-24 | End: 2019-05-24

## 2019-05-24 RX ORDER — LIDOCAINE HYDROCHLORIDE AND EPINEPHRINE 10; 10 MG/ML; UG/ML
INJECTION, SOLUTION INFILTRATION; PERINEURAL AS NEEDED
Status: DISCONTINUED | OUTPATIENT
Start: 2019-05-24 | End: 2019-05-24 | Stop reason: HOSPADM

## 2019-05-24 RX ORDER — DIPHENHYDRAMINE HYDROCHLORIDE 50 MG/ML
12.5 INJECTION, SOLUTION INTRAMUSCULAR; INTRAVENOUS
Status: DISCONTINUED | OUTPATIENT
Start: 2019-05-24 | End: 2019-05-24 | Stop reason: HOSPADM

## 2019-05-24 RX ORDER — MIDAZOLAM HYDROCHLORIDE 1 MG/ML
2 INJECTION, SOLUTION INTRAMUSCULAR; INTRAVENOUS ONCE
Status: COMPLETED | OUTPATIENT
Start: 2019-05-24 | End: 2019-05-24

## 2019-05-24 RX ORDER — OXYCODONE HYDROCHLORIDE 5 MG/1
5 TABLET ORAL
Status: COMPLETED | OUTPATIENT
Start: 2019-05-24 | End: 2019-05-24

## 2019-05-24 RX ORDER — LIDOCAINE HYDROCHLORIDE 20 MG/ML
INJECTION, SOLUTION EPIDURAL; INFILTRATION; INTRACAUDAL; PERINEURAL AS NEEDED
Status: DISCONTINUED | OUTPATIENT
Start: 2019-05-24 | End: 2019-05-24 | Stop reason: HOSPADM

## 2019-05-24 RX ORDER — SUCCINYLCHOLINE CHLORIDE 20 MG/ML
INJECTION INTRAMUSCULAR; INTRAVENOUS AS NEEDED
Status: DISCONTINUED | OUTPATIENT
Start: 2019-05-24 | End: 2019-05-24 | Stop reason: HOSPADM

## 2019-05-24 RX ORDER — PROPOFOL 10 MG/ML
INJECTION, EMULSION INTRAVENOUS AS NEEDED
Status: DISCONTINUED | OUTPATIENT
Start: 2019-05-24 | End: 2019-05-24 | Stop reason: HOSPADM

## 2019-05-24 RX ORDER — FENTANYL CITRATE 50 UG/ML
100 INJECTION, SOLUTION INTRAMUSCULAR; INTRAVENOUS ONCE
Status: COMPLETED | OUTPATIENT
Start: 2019-05-24 | End: 2019-05-24

## 2019-05-24 RX ORDER — HYDROMORPHONE HYDROCHLORIDE 2 MG/ML
0.5 INJECTION, SOLUTION INTRAMUSCULAR; INTRAVENOUS; SUBCUTANEOUS
Status: DISCONTINUED | OUTPATIENT
Start: 2019-05-24 | End: 2019-05-24 | Stop reason: HOSPADM

## 2019-05-24 RX ORDER — LIDOCAINE HYDROCHLORIDE 10 MG/ML
0.1 INJECTION INFILTRATION; PERINEURAL AS NEEDED
Status: DISCONTINUED | OUTPATIENT
Start: 2019-05-24 | End: 2019-05-24 | Stop reason: HOSPADM

## 2019-05-24 RX ADMIN — FENTANYL CITRATE 100 MCG: 50 INJECTION INTRAMUSCULAR; INTRAVENOUS at 08:31

## 2019-05-24 RX ADMIN — ROCURONIUM BROMIDE 5 MG: 10 INJECTION, SOLUTION INTRAVENOUS at 09:34

## 2019-05-24 RX ADMIN — SUCCINYLCHOLINE CHLORIDE 160 MG: 20 INJECTION INTRAMUSCULAR; INTRAVENOUS at 09:34

## 2019-05-24 RX ADMIN — MIDAZOLAM 2 MG: 1 INJECTION INTRAMUSCULAR; INTRAVENOUS at 08:31

## 2019-05-24 RX ADMIN — OXYCODONE HYDROCHLORIDE 5 MG: 5 TABLET ORAL at 11:30

## 2019-05-24 RX ADMIN — LEVALBUTEROL HYDROCHLORIDE 1.25 MG: 1.25 SOLUTION RESPIRATORY (INHALATION) at 10:52

## 2019-05-24 RX ADMIN — EPHEDRINE SULFATE 5 MG: 50 INJECTION, SOLUTION INTRAVENOUS at 09:55

## 2019-05-24 RX ADMIN — LIDOCAINE HYDROCHLORIDE 50 MG: 20 INJECTION, SOLUTION EPIDURAL; INFILTRATION; INTRACAUDAL; PERINEURAL at 09:34

## 2019-05-24 RX ADMIN — FENTANYL CITRATE 50 MCG: 50 INJECTION, SOLUTION INTRAMUSCULAR; INTRAVENOUS at 09:34

## 2019-05-24 RX ADMIN — PROPOFOL 200 MG: 10 INJECTION, EMULSION INTRAVENOUS at 09:34

## 2019-05-24 RX ADMIN — DEXAMETHASONE SODIUM PHOSPHATE 5 MG: 4 INJECTION, SOLUTION INTRA-ARTICULAR; INTRALESIONAL; INTRAMUSCULAR; INTRAVENOUS; SOFT TISSUE at 09:51

## 2019-05-24 RX ADMIN — Medication 2 G: at 09:44

## 2019-05-24 RX ADMIN — SODIUM CHLORIDE, SODIUM LACTATE, POTASSIUM CHLORIDE, AND CALCIUM CHLORIDE 100 ML/HR: 600; 310; 30; 20 INJECTION, SOLUTION INTRAVENOUS at 06:57

## 2019-05-24 NOTE — H&P
Date of Surgery Update:  Obdulia Beavers was seen and examined. History and physical has been reviewed. The patient has been examined.  There have been no significant clinical changes since the completion of the originally dated History and Physical.    Signed By: Cayla Boone MD     May 24, 2019 6:31 AM

## 2019-05-24 NOTE — ANESTHESIA PROCEDURE NOTES
Peripheral Block    Start time: 5/24/2019 8:31 AM  End time: 5/24/2019 8:37 AM  Performed by: Romeo Valenzuela MD  Authorized by: Romeo Valenzuela MD       Pre-procedure: Indications: at surgeon's request and post-op pain management    Preanesthetic Checklist: patient identified, risks and benefits discussed, site marked, timeout performed, anesthesia consent given and patient being monitored      Block Type:   Block Type:   Interscalene  Laterality:  Left  Monitoring:  Continuous pulse ox, frequent vital sign checks, heart rate, responsive to questions and oxygen  Injection Technique:  Single shot  Procedures: ultrasound guided    Prep: chlorhexidine    Location:  Interscalene  Needle Type:  Stimuplex  Needle Gauge:  20 G  Needle Localization:  Anatomical landmarks, infiltration and ultrasound guidance    Assessment:  Number of attempts:  1  Injection Assessment:  Incremental injection every 5 mL, negative aspiration for CSF, local visualized surrounding nerve on ultrasound, negative aspiration for blood, no intravascular symptoms, no paresthesia and ultrasound image on chart  Patient tolerance:  Patient tolerated the procedure well with no immediate complications

## 2019-05-24 NOTE — ANESTHESIA POSTPROCEDURE EVALUATION
Procedure(s):  ARTHROSCOPY LEFT SHOULDER ARTHROSCOPIC SUBACROMIAL DECOMPRESSION, DISTAL CLAVICLE RESECTION, EXTENSIVE DEBRIDEMENT SLAP TEAR, GLENOHUMERAL JOINT, SUBACROMIAL SPACE.     general    Anesthesia Post Evaluation      Multimodal analgesia: multimodal analgesia used between 6 hours prior to anesthesia start to PACU discharge  Patient location during evaluation: bedside  Patient participation: complete - patient participated  Level of consciousness: awake and alert  Pain score: 3  Pain management: adequate  Airway patency: patent  Anesthetic complications: no  Cardiovascular status: acceptable and hemodynamically stable  Respiratory status: acceptable  Hydration status: acceptable  Post anesthesia nausea and vomiting:  none      Vitals Value Taken Time   /60 5/24/2019 11:30 AM   Temp 36.7 °C (98.1 °F) 5/24/2019 10:30 AM   Pulse 86 5/24/2019 11:30 AM   Resp 15 5/24/2019 11:30 AM   SpO2 92 % 5/24/2019 11:30 AM

## 2019-05-24 NOTE — OP NOTES
[place]  OPERATIVE REPORT    Name:  Batsheva Lindo  MR#:  [id number]  :  1965  ACCOUNT #:  [de-identified]  DATE OF SERVICE:  2019    PREOPERATIVE DIAGNOSES:  1. Adhesive capsulitis, left shoulder. 2.  Biceps tendinitis, left shoulder. 3.  Superior labrum anterior posterior tear, left shoulder. 4.  Acromioclavicular joint arthritis, left shoulder. POSTOPERATIVE DIAGNOSES:  1. Superior labrum anterior posterior tear, left shoulder. 2.  Rotator cuff tendinitis, left shoulder. 3.  Acromioclavicular joint arthritis, left shoulder. PROCEDURE PERFORMED:  Arthroscopy of left shoulder; arthroscopic subacromial decompression, arthroscopic distal clavicle resection, extensive debridement of SLAP tear, glenohumeral joint, subacromial space. SURGEON:  Tri Montero. Vinicius Johnson MD    ASSISTANT:  Willi Corona CFA    ANESTHESIA:  General with an interscalene block. COMPLICATIONS:  None. ESTIMATED BLOOD LOSS:  10 mL. PATHOLOGY:  1. Type 2 acromion. 2.  AC joint arthritis. 3.  Type 1 SLAP tear. 4.  Bursitis. CPT CODES:  [id number], [id number], [id number]. ICD-10 CODES:  M75.52, M2352853, M19.012. INDICATIONS:  The patient is a 61-year-old female, who has developed recalcitrant left shoulder pain. Preoperative physical exam, radiographs, and an MR demonstrate a type 2 acromion, degenerative AC joint, possible SLAP tear. The patient has developed a sore, painful, stiff left shoulder. She is a diabetic. She has exhausted nonoperative modalities and electively admitted for operative intervention. PROCEDURE:  Following identification of the patient, the patient was taken to the operative suite. Following administration of general anesthesia, interscalene block for postop pain control, 2 g of IV Ancef, and measurement of hemoglobin A1c and fasting blood glucose 6.3 and 108 respectively, the patient was positioned on the operating room table in the supine fashion.   Left shoulder was examined under anesthesia and was noted to be stable through full range of motion. There was no evident stiffness. Motion was comparable on the left to the right side. The patient was then very carefully positioned in the lateral decubitus position right side down. Axillary roll was placed. Beanbag was inflated. Care was taken to pad both dependent lower and upper extremities. Left arm was then placed in [employer] traction device in 15 pounds of traction. Left shoulder was then prepped and draped in sterile fashion. Subacromial space was injected with 10 mL of 1% Xylocaine with epinephrine. Scope was introduced in the shoulder. Diagnostic arthroscopy was then commenced. Articular surfaces of the humeral head and glenoid were visualized and noted to be intact. Anterior, posterior, superior, and inferior labrum were visualized. The anterior, posterior, and inferior bands of the IGHL were intact. There was a sublabral hole superiorly. There was some fraying of the labrum superiorly consistent with a type 1 SLAP. This was debrided using a 4.5 full radius resector and [employer]ate[employer] wand. The biceps anchor itself was intact. The biceps was intact. There was some synovitis in the glenohumeral joint and this was resected as well. The undersurface of the rotator cuff including the subscapularis, supra and infraspinatus, and teres minor were intact. Scope was then flip-flopped from the posterior to the anterior portal.  Posterior cuff and labrum were visualized. Type 1 SLAP tear was confirmed. Posterior cuff was intact. Scope was then placed in the subacromial space. Lateral portal was then established. Hypertrophic hemorrhagic bursal tissue was then resected. There was significant bursal tissue. This was resected in its entirety. The bursal side of the cuff was intact. At this point, using [employer]atec wand and acromionizer odette, arthroscopic subacromial decompression was then performed.   This was taken down to the level of the deltoid fascia anteriorly, AC joint posteriorly, contoured from medial to lateral.  Once this was completed, our attention was then turned to resecting the distal clavicle. The distal 10 mm and 10 mm of the distal clavicle was then resected. Care was taken to preserve the posterior and superior capsule. At this point, with the procedure complete, arthroscopic equipment was removed from the shoulder. The portals were dressed using 2-0 nylon horizontal mattress sutures. An Aquacel dressing was applied. A Cryo/Cuff and swathe was applied. The patient was then transferred to the recovery room in stable condition. MD AMIRA [id number]  D:  05/24/2019 10:27  T:  05/24/2019 12:57  JOB #:  [id number]  CC:   Shayy Brice MD

## 2019-05-24 NOTE — DISCHARGE INSTRUCTIONS
INSTRUCTIONS FOLLOWING ARTHROSCOPY SURGERY  Dr. Jennifer Purdy [phone]    ACTIVITY   As tolerated and as directed by your doctor   Elevate surgery site first 48 hours.  Use arm sling or crutches per your doctors instructions.  Bathe or shower as directed by your doctor. DIET   Clear liquids until no nausea or vomiting; then light diet for the first day   Advance to regular diet on second day, unless your doctor orders otherwise.  If nausea and vomiting continues, call your doctor. PAIN   Take pain medication as directed by your doctor.  Call your doctor if pain is NOT relieved by medication.  DO NOT take aspirin or blood thinners until directed by your doctor. DRESSING CARE: Follow all dressing care instructions provided by Dr. Yesika Kumar will be made by nursing staff.  If you have any problems or concerns, call your doctor as needed. CALL YOUR DOCTOR IF   Excessive bleeding that does not stop after holding mild pressure over the area   Temperature of 101°F or above   Redness, excessive swelling or bruising, and/or green or yellow, smelly discharge from incision    AFTER ANESTHESIA   For the next 24 hours: DO NOT Drive, Drink alcoholic beverages, or Make important decisions.  Be aware of dizziness following anesthesia and while taking pain medication. MEDICATIONS:  Continue home medications as previously prescribed with the following changes: {None:33103::\"none\"}. Cryo Cuff or Iceman 24-48 hours continuously    APPOINTMENT DATE/TIME: On *** at *** {Time; am/pm:05599} at the *** location.

## 2019-05-24 NOTE — ANESTHESIA PREPROCEDURE EVALUATION
Anesthetic History               Review of Systems / Medical History  Patient summary reviewed and pertinent labs reviewed    Pulmonary                   Neuro/Psych         Psychiatric history     Cardiovascular    Hypertension: well controlled              Exercise tolerance: >4 METS     GI/Hepatic/Renal     GERD: well controlled           Endo/Other    Diabetes: well controlled, type 2  Hypothyroidism: well controlled  Obesity     Other Findings            Physical Exam    Airway  Mallampati: II  TM Distance: 4 - 6 cm  Neck ROM: normal range of motion   Mouth opening: Diminished (comment)     Cardiovascular  Regular rate and rhythm,  S1 and S2 normal,  no murmur, click, rub, or gallop  Rhythm: regular  Rate: normal         Dental  No notable dental hx       Pulmonary  Breath sounds clear to auscultation               Abdominal         Other Findings            Anesthetic Plan    ASA: 3  Anesthesia type: general      Post-op pain plan if not by surgeon: peripheral nerve block single    Induction: Intravenous  Anesthetic plan and risks discussed with: Patient

## 2019-09-25 ENCOUNTER — HOSPITAL ENCOUNTER (OUTPATIENT)
Dept: PHYSICAL THERAPY | Age: 54
Discharge: HOME | End: 2019-09-25
Payer: COMMERCIAL

## 2019-09-25 PROCEDURE — 97162 PT EVAL MOD COMPLEX 30 MIN: CPT

## 2019-09-25 PROCEDURE — 97110 THERAPEUTIC EXERCISES: CPT

## 2019-09-25 NOTE — THERAPY EVALUATION
[facility] ALOK Santamaria : 1965 [facility] Secondary:  Therapy Center at [facility] KAYCEE SOTELO [address] [facility], [address] [facility] [address] [phone]   [phone] OUTPATIENT PHYSICAL THERAPY:Initial Assessment 2019 ICD-10: Treatment Diagnosis: Pain in left shoulder (M25.512); Stiffness of left shoulder, not elsewhere classified (M25.612); Bursitis of left shoulder (M75.52) Precautions/Allergies: From EMR: Amoxicillin. MD Orders: Eval and Treat; HEP; Strengthening; ROM; \"aggressive strengthening, join as gym member\" (19) MEDICAL/REFERRING DIAGNOSIS: s/p left shoulder EUA Manipulation Arthroscopy ASD ADCR RCR BT  
DATE OF ONSET: 19 surgery REFERRING PHYSICIAN: Vivian Shah MD 
RETURN PHYSICIAN APPOINTMENT: 10/29/19 INITIAL ASSESSMENT:  Ms. Tutu Aguilar presents with the complaint of persistent L shoulder and UE pain and stiffness limiting use comfort and use of her L UE. She is 4 months s/p L shoulder scope with subacromial decompression, arthroscopic distal clavicle resection, extensive debridement of a type 1 SLAP tear, glenohumeral joint, and subacromial space. She has been doing post op PT at another facility until 2 weeks ago. Her shoulder is quite irritable and stiff, and she appears to tends to high muscle tone and guarding. Pain is primarily to the L humerus with intermittent pain to superior and anterior shoulder. Passive and active ROM are both limited by stiffness, muscle guarding, and pain. There is pain weakness to internal and external rotators, and positive pain/weakness on [address], [address], and Lift-Off tests. There are multiple compensation with active humeral elevation and poor scapulohumeral rhythm and mechanics. She will benefit from PT for guided shoulder rehab to promote reduced symptoms and return to normalized use of the UE for return to work.   
PROBLEM LIST (Impacting functional limitations): 
 1. L shoulder/UE pain 2. Decreased L shoulder ROM 3. Weakness L shoulder INTERVENTIONS PLANNED: (Treatment may consist of any combination of the following) 1. Thermal and electric modalities, manual therapies, taping, functional dry needling as needed for pain and muscle dysfucntions 2. Manual therapies, therapeutic exercises, HEP for ROM 3. Therapeutic exercises and HEP for strength TREATMENT PLAN: 
Effective Dates: 9/25/2019 TO 11/9/2019 (45 days). Frequency/Duration: 2-3 times a week for 45 Days, then re-assess for need to continue to address discharge goals. GOALS: (Goals have been discussed and agreed upon with patient.) Short-Term Functional Goals: Time Frame: 6 weeks 1. Report no more than 4/10 intermittent pain to L shoulder girdle during basic functional activities, and score less than 25% on the DASH. 2. R shoulder PROM forward elevation greater than 150 degrees and external rotation greater than 75 degrees to progress into her normalized ranges. 3. Demonstrate good shoulder girdle and cuff strength with manual testing to progress into strength phase. 4. Independent with a basic shoulder HEP. Discharge Goals: Time Frame: 12 weeks 1. No more than 3/10 intermittent pain L shoulder with return to normalized household and work activities, and score less than 15% on the DASH. 2. L shoulder AROM forward elevation greater than 150 degrees, external rotation greater than 80 degrees, and strength to shoulder are grossly WNL's for safe use with normalized activities. 3. Demonstrate good functional shoulder strength and endurance for return to normalized household and work activities. 4. Independent with advanced shoulder HEP for continued self-management. OUTCOME MEASURE:  
Tool Used: Disabilities of the Arm, Shoulder and Hand (DASH) Questionnaire - Quick Version Score:  Initial: 26/55 or 34% disability Most Recent: X/55 (Date: -- ) Interpretation of Score: The DASH is designed to measure the activities of daily living in person's with upper extremity dysfunction or pain. Each section is scored on a 1-5 scale, 5 representing the greatest disability. The scores of each section are added together for a total score of 55. This number is divided by 11, followed by subtracting 1 and multiplying by 25 to get a percent score of disability. This value represents the percentage disability: 0-20% minimal disability; 20-40% moderate disability; 40-60% severe disability; % dependent for care or exaggerated symptom behavior. Minimal detectable change is 12%. MEDICAL NECESSITY:  
· L/non-dominant shoulder pain, stiffness, and weakness impairments at 4 months post op are limiting comfort, basic use of the UE, and ability to return to work activites. · She is 4 months post op with persistent symptoms. REASON FOR SERVICES/OTHER COMMENTS: 
· As above. Total Duration: 60 minutes PT Patient Time In/Time Out Time In: 1310 Time Out: 2267 Rehabilitation Potential For Stated Goals: Good Regarding Javid Santamaria's therapy, I certify that the treatment plan above will be carried out by a therapist or under their direction. Thank you for this referral, 
 
Flor Manriquez, PT, MSPT, OCS Referring Physician Signature: Meenakshi Harrison MD No Signature is Required for this note. PAIN/SUBJECTIVE:  
Initial:   4/10 now, 0/10 best, 7/10 worst Post Session:  4/10 HISTORY:  
History of Injury/Illness (Reason for Referral): L shoulder pain beginning in February 2019. She relates onset to repetitive overhead reaching/lifting with her job. Had conservative treatment with chiropractic, PT with no improvement prior to ortho consult. MRI done showing bursitis and possible SLAP tear. Arthroscopic surgery done 5/24/19. She had post op PT at another facility unit 2 1/2 weeks ago.  She had been doing shoulder girdle strengthening routine in PT most recently. Has had initial post op motion work, dry needling, taping. Was told to work on the same exercises at home. She did have an injection to the shoulder in August with temporary relief. She was referred here after her ortho follow up on 9/24/19. Pain persists to the L humerus primarily, with intermittent pain to anterior shoulder, upper trap, and neck. Pain and difficulty with reaching overhead, across body, behind back. Shoulder is stiff and weak. Taking meds, and new prescription on 9/24/19. Has trouble getting comfortable to sleep, but able to sleep. Normally a L side sleeper. Past Medical History/Comorbidities: s/p L shoulder scope 5/24/19; diabetes; HTN. Other PMH from EMR:  
Ms. Domonique Ruvalcaba  has a past medical history of Adverse effect of anesthesia, Depression, Diabetes ([facility] [address] Headache, History of kidney stones, Hypertension, and Hypothyroidism. Ms. Domonique Ruvalcaba  has a past surgical history that includes hx breast reduction (1999); fragment kidney stone/ eswl; hx trabeculectomy (2011); hx colonoscopy (2017); hx dilation and curettage (10/2017); hx heent; and hx heent (2018). Social History/Living Environment: Unremarkable. Prior Level of Function/Work/Activity: Independent with all her normal activity and use of her L/non-dominant UE prior to this pain problem and surgery. Out of work since February 2019. 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 Dominant Side: RIGHT Ambulatory/Rehab Services H2 Model Falls Risk Assessment Risk Factors: 
     No Risk Factors Identified Ability to Rise from Chair: 
     (0)  Ability to rise in a single movement Falls Prevention Plan: No modifications necessary Total: (5 or greater = High Risk): 0  
©2010 [employer] States Patent #[id number]. Federal Law prohibits the replication, distribution or use without written permission from [facility] OUTSIDE THE BOX MARKETING [facility] Current Medications: Duexis, Pennsaid, Tramadol all prescribed 9/24/19 but not yet started. Current Outpatient Medications from EMR:  
  escitalopram oxalate (LEXAPRO) 10 mg tablet, Take 1 Tab by mouth nightly., Disp: 30 Tab, Rfl: 11  
  lisinopril (PRINIVIL, ZESTRIL) 5 mg tablet, Take 1 Tab by mouth nightly. Indications: high blood pressure, Disp: 30 Tab, Rfl: 11 
  metFORMIN (GLUCOPHAGE) 500 mg tablet, Take 1 Tab by mouth nightly., Disp: 30 Tab, Rfl: 11 
  exenatide microspheres (BYDUREON BCISE) 2 mg/0.85 mL atIn, Inject once weekly, Disp: 4 Syringe, Rfl: 11 
  topiramate (TOPAMAX) 50 mg tablet, 1 po at HS  Indications: Migraine Prevention, Disp: 30 Tab, Rfl: 11 
  raNITIdine (ZANTAC) 150 mg tablet, Take 1 Tab by mouth two (2) times daily as needed for Indigestion. Indications: heartburn, Disp: 60 Tab, Rfl: 11  
  levothyroxine (UNITHROID) 25 mcg tablet, Take 1 Tab by mouth Daily (before breakfast). , Disp: 30 Tab, Rfl: 1 
  temazepam (RESTORIL) 15 mg capsule, TAKE ONE CAPSULE BY MOUTH AT BEDTIME AS NEEDED FOR SLEEP, Disp: , Rfl: 0 
  Flaxseed Oil oil, by Does Not Apply route., Disp: , Rfl:   
Date Last Reviewed: 9/24/19 Number of Personal Factors/Comorbidities that affect the Plan of Care: 1-2: MODERATE COMPLEXITY EXAMINATION:  
  
Observation/Orthostatic Postural Assessment: Comes into the clinic without distress. Well-healed portal wounds noted to L shoulder. Increased muscle bulk noted to L upper trap and scalenes. Standing alignment with L scapular elevation/abduction, humeral head anterior. Breathing mechanics: increased upper rib, neck, shoulder girdle action with breathing at rest; breath holding with shoulder motions.  
Palpation: Tender knots to L scapular upper trap, distal clavicular and sternal pec major, mid deltoid, subscapularis, and biceps belly. Tender to superior and anterior humeral head, biceps tendon at groove. ROM:  
LUE AROM 
L Shoulder Flexion: 130(forward elevation with compensation.) L Shoulder Internal Rotation: 35(in abduction; to S1 behind back.) L Shoulder External Rotation: 60(by side, 55 deg in abduction.) LUE PROM 
L Shoulder Flexion: 125(forward elevation. Pain at end range.) L Shoulder ABduction: 80(with scapula stabilized) L Shoulder Internal Rotation: 55(stabilized at 80 deg abd) L Shoulder External Rotation: 45(in shoulder neutral, 60 at 45 abd, 55 at 80 deg abd.) RUE AROM 
R Shoulder Flexion: 165(forward elevation) R Shoulder Internal Rotation: 70(at 90 deg abd; to T7 behdind back.) R Shoulder External Rotation: 70(by side, 90 deg in abduction.) RUE PROM 
R Shoulder Flexion: 165(forward elevation) R Shoulder ABduction: 95(wtih scapula stabilized) R Shoulder Internal Rotation: 60(stabilized at 90 deg abd) R Shoulder External Rotation: 80(in shoulder neutral, 90 at 45 abd, 95 at 80 deg abd) Strength: L Shoulder: weak with pain to IR>ER, strong and painless to remainder with Soft Tissue Differential testing. R Shoulder: strong and painless to all with Soft Tissue Differential testing; grossly 5/5 with manual testing. Special Tests: Empty Can and ER Drop arm test negative. Belly Press, [place], and Lift Off tests: weak with pain. Horiz add test: positive pain. Neurological Screen: Motor and sensory to L UE intact. Body Structures Involved: 1. Joints 2. Muscles Body Functions Affected: 1. Sensory/Pain 2. Neuromusculoskeletal 
3. Movement Related Activities and Participation Affected: 1. General Tasks and Demands 2. Self Care Number of elements (examined above) that affect the Plan of Care: 4+: HIGH COMPLEXITY CLINICAL PRESENTATION:  
Presentation: Evolving clinical presentation with changing clinical characteristics: MODERATE COMPLEXITY CLINICAL DECISION MAKING:  
Use of outcome tool(s) and clinical judgement create a POC that gives a: Questionable prediction of patient's progress: MODERATE COMPLEXITY

## 2019-09-25 NOTE — PROGRESS NOTES
Cyrus Vigil  : 1965  Payor: Gareth Main / Plan: [address] HMO/CHOICE PLUS/POS / Product Type: HMO /  [address]  at [facility] KAYCEE SOTELO  [address] [facility], [address], [place] [address].  [phone]   [phone]       OUTPATIENT PHYSICAL THERAPY: Daily Treatment Note 2019  Visit Count: 1     ICD-10: Treatment Diagnosis: Pain in left shoulder (M25.512); Stiffness of left shoulder, not elsewhere classified (M25.612); Bursitis of left shoulder (M75.52)   Precautions/Allergies: From EMR: Amoxicillin. MD Orders: Eval and Treat; HEP; Strengthening; ROM; \"aggressive strengthening, join as gym member\" (19) MEDICAL/REFERRING DIAGNOSIS: s/p left shoulder EUA Manipulation Arthroscopy ASD ADCR RCR BT   DATE OF ONSET: 19 surgery  REFERRING PHYSICIAN: Bird Teague MD  RETURN PHYSICIAN APPOINTMENT: 10/29/19       Pre-treatment Symptoms/Complaints:  See evaluation from today. Pain: Initial:   4/10 now, 0/10 best, 7/10 worst Post Session:  4/10   Medications Last Reviewed: 19    Updated Objective Findings:   See evaluation note from today     TREATMENT:     Manual Therapies: (5 Minutes): Positional Release technique to L scapular upper trap, distal pec major, and subscapularis for muscle restrictions. Kinesio taping for deltoid inhibition, mechanical correction of anterior humeral head, and AC joint correction techniques. Instruction in tape wear and removal if skin is irritated. She/He verbalizes understanding. Therapeutic Exercise: (15 Minutes): Instruction and performance in diaphragmatic breathing in hook-lying; supine wand self-stretch with respiration to ER at 30, 45, and 80-90 deg; side-lying IR sleeper self-stretch with respiration; and pulleys for controlled humeral elevation with stable spine to scaption, flexion, and abduction. These are to be for HEP. Good return demonstration with training.     HEP: Verbal instruction for the exercises above. She is to hold on all other HEP to date. She is to use her L UE with ADL's. She is to remove the tape in 2-3 days. She verbalizes understanding. Treatment/Session Summary:    · Response to Treatment: She is 4 months post op L shoulder scope. Shoulder and upper UE is irritable. There is marked stiffness to the shoulder. There is pain weakness to the internal and external rotators. Decreased scapulohumeral rhythm with active humeral elevation. Increased muscle tone and guarding to shoulder, and upper lung breathing noted. · Communication/Consultation:  None today  · Equipment provided today:  Provided pulleys for home use  · Recommendations/Intent for next treatment session: Will continue with L shoulder motion; review HEP; progress breathing and L brachial chain exercises. Treatment Plan of Care Effective Dates:  9/25/2019 TO 11/9/2019 (45 days). Frequency/Duration: 2-3 times a week for 45 Days, then re-assess for need to continue to address discharge goals.       Total Treatment Billable Duration:  20 Minutes  PT Patient Time In/Time Out  Time In: 1310  Time Out: [address], PT    Future Appointments   Date Time Provider Greta Mares   9/26/2019 10:30 AM Keyshawn Mcneil, PT [facility]   9/30/2019 11:15 AM Karly Oliva, PT [facility]   10/2/2019  1:45 PM Karlytonya Oliva, PT [facility]   10/7/2019  2:30 PM Karly Hazel, PT [facility]   10/10/2019  1:00 PM Karly Hazel, PT [facility]   10/14/2019  1:00 PM Karly Hazel, PT [facility]   10/17/2019  1:00 PM Karly Hazel, PT [facility]   10/21/2019  1:00 PM Karly Hazel, PT [facility]   10/24/2019  1:00 PM Karly Hazel, PT [facility]   10/28/2019  1:00 PM Karly Hazel, PT [facility]   10/31/2019  1:00 PM Karly Hazel, PT [facility]   11/4/2019  1:00 PM Gerald Glass Moriah Banda, PT [facility]   11/21/2019  8:30 AM [facility]   12/3/2019 10:50 AM Kwasi Carlisle PA SSA [facility]

## 2019-09-26 ENCOUNTER — HOSPITAL ENCOUNTER (OUTPATIENT)
Dept: PHYSICAL THERAPY | Age: 54
Discharge: HOME | End: 2019-09-26
Payer: COMMERCIAL

## 2019-09-26 PROCEDURE — 97110 THERAPEUTIC EXERCISES: CPT

## 2019-09-26 PROCEDURE — 97140 MANUAL THERAPY 1/> REGIONS: CPT

## 2019-09-26 NOTE — PROGRESS NOTES
Nicolas Dial  : 1965  Payor: Philippe Pham / Plan: [employer] HMO/CHOICE PLUS/POS / Product Type: HMO /  [address]  at [facility] KAYCEE SOTELO  [address] [facility], [address], [place] [address].  [phone]   [phone]       OUTPATIENT PHYSICAL THERAPY: Daily Treatment Note 2019  Visit Count: 2     ICD-10: Treatment Diagnosis: Pain in left shoulder (M25.512); Stiffness of left shoulder, not elsewhere classified (M25.612); Bursitis of left shoulder (M75.52)   Precautions/Allergies: From EMR: Amoxicillin. MD Orders: Eval and Treat; HEP; Strengthening; ROM; \"aggressive strengthening, join as gym member\" (19) MEDICAL/REFERRING DIAGNOSIS: s/p left shoulder EUA Manipulation Arthroscopy ASD ADCR RCR BT   DATE OF ONSET: 19 surgery  REFERRING PHYSICIAN: Myke Macedo MD  RETURN PHYSICIAN APPOINTMENT: 10/29/19       Pre-treatment Symptoms/Complaints: Shoulder was a little better last night. Tape is still on and feels helpful. Has been working on the breathing exercise but not had a chance to do the others. Pain: Initial:   No VAS. Post Session:     Medications Last Reviewed: 19    Updated Objective Findings:   No new measure      TREATMENT:     Manual Therapies: (20 Minutes):Positional Release technique to tender points in L scapular upper trap, distal pec major, mid deltoid, subscapularis, and biceps for muscle restrictions. L glenohumeral joint mobilization with distraction and long axis traction, grade 2 to 4- oscillations for pain and stiffness. Kinesio taping for deltoid inhibition, mechanical correction of anterior humeral head, and AC joint correction techniques. Instruction in tape wear and removal if skin is irritated. She/He verbalizes understanding.   Therapeutic Exercise: (25 Minutes): L shoulder motion in supine with AROM with respiration x5 each, assisted Active Isolated Stretching  3\"x10 each, and reciprocal inhibition stretching 10\"x6 each to ER at 30, 45, and 80 deg abd, IR at 45 and 80 abd, hold/relax to abd with scapula stabilized 30\"x3; assisted Active Isolate Stretch to ER and IR at 90-deg flexion, horiz abd in 0-deg and 90 deg ER, 3\"x10 each; and hold/relax quadrant stretch 30\"x3. Brief review of HEP from yesterday. She feels comfortable working on these. HEP: No new HEP. She verbalizes understanding. Treatment/Session Summary:    · Response to Treatment: Continues with stiff and irritable L shoulder. Quite tight to anterior structures. Muscle over-facilitation around the shoulder. · Communication/Consultation:  None today  · Equipment provided today:  None today   · Recommendations/Intent for next treatment session: Will continue with L shoulder motion; review HEP; progress breathing and L brachial chain exercises. Treatment Plan of Care Effective Dates:  9/25/2019 TO 11/9/2019 (45 days). Frequency/Duration: 2-3 times a week for 45 Days, then re-assess for need to continue to address discharge goals.       Total Treatment Billable Duration: 45 Minutes  PT Patient Time In/Time Out  Time In: 1030  Time Out: Shashi 24, PT    Future Appointments   Date Time Provider Greta Mares   9/30/2019 11:15 AM Kandice Draper, PT [facility]   10/2/2019  1:45 PM Amanda Sample, PT [facility]   10/7/2019  2:30 PM Amanda Sample, PT [facility]   10/10/2019  1:00 PM Amanda Sample, PT [facility]   10/14/2019  1:00 PM Amanda Sample, PT [facility]   10/17/2019  1:00 PM Amanda Sample, PT [facility]   10/21/2019  1:00 PM Amanda Sample, PT [facility]   10/24/2019  1:00 PM Amanda Sample, PT [facility]   10/28/2019  1:00 PM Amanda Sample, PT [facility]   10/31/2019  1:00 PM Amanda Sample, PT [facility]   11/4/2019  1:00 PM Amanda Sample, PT [facility] 11/21/2019  8:30 AM [facility]   12/3/2019 10:50 AM Stephen Carlisle PA SSA [facility] P[facility]

## 2019-09-30 ENCOUNTER — HOSPITAL ENCOUNTER (OUTPATIENT)
Dept: PHYSICAL THERAPY | Age: 54
Discharge: HOME | End: 2019-09-30
Payer: COMMERCIAL

## 2019-09-30 PROCEDURE — 97110 THERAPEUTIC EXERCISES: CPT

## 2019-09-30 PROCEDURE — 97140 MANUAL THERAPY 1/> REGIONS: CPT

## 2019-09-30 NOTE — PROGRESS NOTES
Mary Jane Lizarraga  : 1965  Payor: Brandon Veloz / Plan: [employer] HMO/CHOICE PLUS/POS / Product Type: HMO /  [address]  at [facility] KAYCEE SOTELO  [address] [place] [address], [address], [address] [facility]  [phone]   [phone]       OUTPATIENT PHYSICAL THERAPY: Daily Treatment Note 2019  Visit Count: 3     ICD-10: Treatment Diagnosis: Pain in left shoulder (M25.512); Stiffness of left shoulder, not elsewhere classified (M25.612); Bursitis of left shoulder (M75.52)   Precautions/Allergies: From EMR: Amoxicillin. MD Orders: Eval and Treat; HEP; Strengthening; ROM; \"aggressive strengthening, join as gym member\" (19) MEDICAL/REFERRING DIAGNOSIS: s/p left shoulder EUA Manipulation Arthroscopy ASD ADCR RCR BT   DATE OF ONSET: 19 surgery  REFERRING PHYSICIAN: Naina Woodward MD  RETURN PHYSICIAN APPOINTMENT: 10/29/19       Pre-treatment Symptoms/Complaints: Shoulder was doing better without pain for a few days after last time. Helped with a wedding on Saturday and lifted some boxes from her car which made it hurt. Pain: Initial:   No VAS. Post Session:     Medications Last Reviewed: stared Pennsaid 19    Updated Objective Findings:   ROM:      LUE AROM  L Shoulder Flexion: 150(forward elevation)  LUE PROM  L Shoulder Flexion: 152(forward elevation)  L Shoulder ABduction: 90(with scapula stabilized)  L Shoulder External Rotation: 60(in shoulder neutral, 75 at 45 abd, 60 at 90 deg abd.)                       TREATMENT:     Therapeutic Modalities: (12 Minutes): Moist heat to L shoulder for thermal effects to start treatment, in sitting with arm propped, x12'. Manual Therapies: (15 Minutes):Positional Release technique to tender points in L scapular upper trap, distal pec major, mid deltoid, subscapularis, and biceps for muscle restrictions.  L glenohumeral joint mobilization with distraction and long axis traction, grade 2 to 4- oscillations for pain and stiffness. Shoulder quadrant mobilization grade 2- to 4- -, 3x30\"  Therapeutic Exercise: (30 Minutes): L shoulder motion in supine with AROM with respiration x5 each, assisted Active Isolated Stretching  3\"x10 each, and reciprocal inhibition stretching 10\"x6 each to ER at 30, 45, and 80 deg abd, IR at 45 and 80 abd, hold/relax to abd with scapula stabilized 30\"x3; assisted Active Isolate Stretch to ER and IR at 90-deg flexion, horiz abd in 0-deg and 90 deg ER, 3\"x10 each; and hold/relax quadrant stretch 30\"x3. Instruction and performance in supine bilat ER/IR with hands behind head to be done with HEP. Good return demonstration. Exercises for shoulder girdle scapulohumeral rhythm and strength with side-lying middle and lower trap rhythmic initiation to active adduction+UE lift off x10 each; prone unilat shoulder ext, horiz abd, scaption with manual guiding x10 each; supine bilat press with yellow band pull apart x10, 45-deg incline press with bilat yellow band pull apart x10, and standing bilat ovehead with yellow band pull apart x5. Instruction for prone and 45-deg incline press with band. Good understanding. *.   HEP: Continue with existing HEP, and can adde the supine and 45-deg incline press with band. She verbalizes understanding. Treatment/Session Summary:    · Response to Treatment: Better motion noted today, and reports less pain. Shoulder movement control and mechanics with humeral elevation is decreased with excess scapular elevation and over adduction with the prone series moves. · Communication/Consultation:  None today  · Equipment provided today:  None today   · Recommendations/Intent for next treatment session: Will continue with L shoulder motion; review HEP; progress breathing and L brachial chain exercises. Treatment Plan of Care Effective Dates:  9/25/2019 TO 11/9/2019 (45 days).   Frequency/Duration: 2-3 times a week for 45 Days, then re-assess for need to continue to address discharge goals.       Total Treatment Billable Duration: 57 Minutes  PT Patient Time In/Time Out  Time In: 1130  Time Out: [address], PT    Future Appointments   Date Time Provider Greta Mares   10/2/2019  1:45 PM Jerrell Harada, Lyman Matin, PT [facility]   10/7/2019  2:30 PM Serafin Sake, PT [facility]   10/10/2019  1:00 PM Serafin Sake, PT [facility]   10/14/2019  1:00 PM Serafin Sake, PT [facility]   10/17/2019  1:00 PM Serafin Sake, PT [facility]   10/21/2019  1:00 PM Serafin Sake, PT [facility]   10/24/2019  1:00 PM Serafin Sake, PT [facility]   10/28/2019  1:00 PM Serafin Sake, PT [facility]   10/31/2019  1:00 PM Serafin Sake, PT [facility]   11/4/2019  1:00 PM Serafin Sake, PT [facility]   11/21/2019  8:30 AM [facility]   12/3/2019 10:50 AM Nirmal Carlisle PA [facility]

## 2019-10-02 ENCOUNTER — HOSPITAL ENCOUNTER (OUTPATIENT)
Dept: PHYSICAL THERAPY | Age: 54
Discharge: HOME | End: 2019-10-02
Payer: COMMERCIAL

## 2019-10-02 PROCEDURE — 97140 MANUAL THERAPY 1/> REGIONS: CPT

## 2019-10-02 PROCEDURE — 97110 THERAPEUTIC EXERCISES: CPT

## 2019-10-07 ENCOUNTER — HOSPITAL ENCOUNTER (OUTPATIENT)
Dept: PHYSICAL THERAPY | Age: 54
Discharge: HOME | End: 2019-10-07
Payer: COMMERCIAL

## 2019-10-07 PROCEDURE — 97110 THERAPEUTIC EXERCISES: CPT

## 2019-10-07 NOTE — PROGRESS NOTES
Trisha Martínez  : 1965  Payor: Sweetie Astorga / Plan: [address] HMO/CHOICE PLUS/POS / Product Type: HMO /  [address]  at [facility] KAYCEE SOTELO  [address] [place] [address], [address], [place] [address].  [phone]   [phone]       OUTPATIENT PHYSICAL THERAPY: Daily Treatment Note 10/7/2019  Visit Count: 5     ICD-10: Treatment Diagnosis: Pain in left shoulder (M25.512); Stiffness of left shoulder, not elsewhere classified (M25.612); Bursitis of left shoulder (M75.52)   Precautions/Allergies: From EMR: Amoxicillin. MD Orders: Eval and Treat; HEP; Strengthening; ROM; \"aggressive strengthening, join as gym member\" (19) MEDICAL/REFERRING DIAGNOSIS: s/p left shoulder EUA Manipulation Arthroscopy ASD ADCR RCR BT   DATE OF ONSET: 19 surgery  REFERRING PHYSICIAN: Dominick Mcdaniel MD  RETURN PHYSICIAN APPOINTMENT: 10/29/19       Pre-treatment Symptoms/Complaints: Shoulder is ok. Herminia Bertrand when sitting on a swing chair that gave way, which jolted her shoulder and spine. Both are doing better today. No more than \"normal\" pain to the shoulder. Sleeping \"ok\". Working on her HEP. Pain: Initial:   No VAS. Post Session:     Medications Last Reviewed: stared Pennsaid 10/7/19    Updated Objective Findings:   No new measure. TREATMENT:     Therapeutic Exercise: (40 Minutes): Active motion with Muscle Balance moves for upper quarter, including standing with back against wall bilateral ER in shoulder neutral and supported in abduction (close to 90 deg as possible), IR supported in abduction, facing wall bilat ER at 90 deg flexion, facing wall bilat forearm wall slide shoulder flexion, back to wall shoulder forward elevation with elbow extended, 3\"x10 to each with emphasis on stable spine/scapula with mobile shoulder and disassociation of movement at shoulder.   L shoulder motion with assisted Active Isolated Stretches in supine to ER at 30, 45, and 90 degrees abduction, IR at 45 and 90 degrees abduction, ER and IR at 90 degrees flexion, forward elevation, horizontal abduction/adduction with elbow bent 90 deg in shoulder IR and end range ER, 3\"x10 each. Exercises for shoulder girdle scapulohumeral rhythm and strength prone shoulder extension, middle and lower trap AROM, and standing facing wall lower trap wall slide with lift off, each with manual guiding and cues for correct scapular action. .   HEP: She is to continue existing HEP. She verbalizes understanding. Treatment/Session Summary:    · Response to Treatment: Improving shoulder ROM, especially to ER motions. Dysfunction with scapulohumeral mechanics with humeral elevation. Tends toward scapular upward rotation and winging. · Communication/Consultation:  None today  · Equipment provided today:  None today   · Recommendations/Intent for next treatment session: Will continue with L shoulder motion; progress L brachial chain exercises; progress scapulo-cuff strengthening with emphasis on proper mechanics. Treatment Plan of Care Effective Dates:  9/25/2019 TO 11/9/2019 (45 days). Frequency/Duration: 2-3 times a week for 45 Days, then re-assess for need to continue to address discharge goals.       Total Treatment Billable Duration: 40 Minutes  PT Patient Time In/Time Out  Time In: 1300  Time Out: 6 Jocelyn Mitchell PT    Future Appointments   Date Time Provider Greta Mares   10/10/2019  1:00 PM Sarah Byron, PT [facility]   10/14/2019  1:00 PM Sarah Byron, PT [facility]   10/17/2019  1:00 PM Sarah Byron, PT [facility]   10/21/2019  1:00 PM Sarah Byron, PT [facility]   10/24/2019  1:00 PM Sarah Byron, PT [facility]   10/28/2019  1:00 PM Sarah Byron, PT [facility]   10/31/2019  1:00 PM Sarah Byron, PT [facility]   11/4/2019  1:00 PM Sarah Byron, PT [facility]   11/21/2019 8:30 AM [facility]   12/3/2019 10:50 AM Marii Carlisle PA [facility]

## 2019-10-10 ENCOUNTER — HOSPITAL ENCOUNTER (OUTPATIENT)
Dept: PHYSICAL THERAPY | Age: 54
Discharge: HOME | End: 2019-10-10
Payer: COMMERCIAL

## 2019-10-10 PROCEDURE — 97110 THERAPEUTIC EXERCISES: CPT

## 2019-10-10 NOTE — PROGRESS NOTES
Tommy Zurita  : 1965  Payor: Dioni Medico / Plan: [employer] HMO/CHOICE PLUS/POS / Product Type: HMO /  [address] [place]  at [facility] KAYCEE SOTELO  [address] [facility], [address], [address] [facility]  [phone]   [phone]       OUTPATIENT PHYSICAL THERAPY: Daily Treatment Note 10/10/2019  Visit Count: 6     ICD-10: Treatment Diagnosis: Pain in left shoulder (M25.512); Stiffness of left shoulder, not elsewhere classified (M25.612); Bursitis of left shoulder (M75.52)   Precautions/Allergies: From EMR: Amoxicillin. MD Orders: Eval and Treat; HEP; Strengthening; ROM; \"aggressive strengthening, join as gym member\" (19) MEDICAL/REFERRING DIAGNOSIS: s/p left shoulder EUA Manipulation Arthroscopy ASD ADCR RCR BT   DATE OF ONSET: 19 surgery  REFERRING PHYSICIAN: Richard Mathew MD  RETURN PHYSICIAN APPOINTMENT: 10/29/19       Pre-treatment Symptoms/Complaints: Shoulder is ok. Not feeling well overall. Has not been sleeping well secondary to heart burn more than the shoulder. Shoulder pain persists, but overall doing better. Pain: Initial:   No VAS. Post Session:     Medications Last Reviewed: 10/7/19    Updated Objective Findings:   ROM:      LUE PROM  L Shoulder Flexion: 155(forward elevation)  L Shoulder External Rotation: 70(in shoulder neutral, 75 at 45 and 80 deg abd)                      TREATMENT:     Manual Therapies: (5 Minutes): Positional Release technique to L subscapularis, lat dorsi/teres major for muscle restriction to motion.     Therapeutic Exercise: (40 Minutes): Exercises for L shoulder motion with guided assisted Active Isolated Stretches in supine to ER at 30, 45, and 90 degrees abduction, IR at 45 and 90 degrees abduction, ER and IR at 90 degrees flexion, forward elevation, flexion/neutral ER with elbow flexed and elbow extended, horizontal abduction/adduction with elbow bent 90 deg in shoulder IR and end range ER, and to biceps/wrist flexors in neutral and in Median N tension position, and to triceps, 3\"x10 each. Reciprocal inhibition stretch to ER at 30, 45, and 90 deg abd, IR at 90-deg abd and 90 deg flexion, 10\"x5-6 each. Exercises for scapulohumeral rhythm and shoulder girdle strength with side-lying guided forward reach in middle trap and lower trap positions 2x10 each; side-lying middle trap and lower trap scapular add+UE lift off with manual guided rhythmic initiation 2x10 each; prone unilat shoulder ext, horiz abd, and scaption with manual guiding and manual stabilization for scapula 2-3 x5 each. Instruction for HEP in supine shoulder flexion/active Lat Dorsi stretch; standing bilat ER with yellow band with super slow eccentric returns, and Breurger's UE extension, modified, with yellow band and super slow returns. Good return demonstration. HEP: Provided yellow band for exercises instructed in today. She is to continue existing HEP. She verbalizes understanding. Treatment/Session Summary:    · Response to Treatment: Still with L shoulder anterior structure hypomobility and tightness limiting shoulder motion. Improved scapulohumeral contol with the exercises done today. · Communication/Consultation:  None today  · Equipment provided today:  None today   · Recommendations/Intent for next treatment session: Will continue with L shoulder motion; progress L brachial chain exercises; progress scapulo-cuff strengthening with emphasis on proper mechanics. Treatment Plan of Care Effective Dates:  9/25/2019 TO 11/9/2019 (45 days). Frequency/Duration: 2-3 times a week for 45 Days, then re-assess for need to continue to address discharge goals.       Total Treatment Billable Duration: 45 Minutes  PT Patient Time In/Time Out  Time In: 1300  Time Out: [address]askins, PT    Future Appointments   Date Time Provider Greta Mares   10/14/2019  1:00 PM Kandi Harmon, PT [facility]   10/17/2019  1:00 PM Sandy Mott, Humberto Kehr, PT [facility]   10/21/2019  1:00 PM Duncan Confer, PT [facility]   10/24/2019  1:00 PM Duncan Confer, PT [facility]   10/28/2019  1:00 PM Duncan Confer, PT [facility]   10/31/2019  1:00 PM Duncan Confer, PT [facility]   11/4/2019  1:00 PM Duncan Confer, PT [facility]   11/21/2019  8:30 AM PVF LAB [facility]   12/3/2019 10:50 AM Lynnette Carlisle PA [facility]

## 2019-10-14 ENCOUNTER — HOSPITAL ENCOUNTER (OUTPATIENT)
Dept: PHYSICAL THERAPY | Age: 54
Discharge: HOME | End: 2019-10-14
Payer: COMMERCIAL

## 2019-10-14 PROCEDURE — 97110 THERAPEUTIC EXERCISES: CPT

## 2019-10-14 NOTE — PROGRESS NOTES
Zaid Oglesby  : 1965  Payor: Angelita Montalvo / Plan: [employer] HMO/CHOICE PLUS/POS / Product Type: HMO /  [address] [place]  at [facility] KAYCEE SOTELO  [address] [facility], [address], [place] [address].  [phone]   [phone]       OUTPATIENT PHYSICAL THERAPY: Daily Treatment Note 10/14/2019  Visit Count: 7     ICD-10: Treatment Diagnosis: Pain in left shoulder (M25.512); Stiffness of left shoulder, not elsewhere classified (M25.612); Bursitis of left shoulder (M75.52)   Precautions/Allergies: From EMR: Amoxicillin. MD Orders: Eval and Treat; HEP; Strengthening; ROM; \"aggressive strengthening, join as gym member\" (19) MEDICAL/REFERRING DIAGNOSIS: s/p left shoulder EUA Manipulation Arthroscopy ASD ADCR RCR BT   DATE OF ONSET: 19 surgery  REFERRING PHYSICIAN: Jeny Stiles MD  RETURN PHYSICIAN APPOINTMENT: 10/29/19       Pre-treatment Symptoms/Complaints: Shoulder is \"ok\". Still gets pain to it, and sharp pain with certain moves. Pain: Initial:   No VAS. Post Session:     Medications Last Reviewed: 10/14/19    Updated Objective Findings:   No new measure. TREATMENT:     Manual Therapies: (5 Minutes): Positional Release technique to L pec major, subscapularis, lat dorsi/teres major for muscle restriction to motion. Therapeutic Exercise: (40 Minutes): Exercises for L shoulder motion with guided assisted Active Isolated Stretches in supine to ER at 30, 45, and 90 degrees abduction, IR at 45 and 90 degrees abduction, ER and IR at 90 degrees flexion, forward elevation, flexion/neutral ER with elbow flexed and elbow extended, horizontal abduction/adduction with elbow bent 90 deg in shoulder IR and end range ER, and to biceps/wrist flexors in neutral and in Median N tension position, and to triceps, 3\"x10 each. Reciprocal inhibition stretch to ER at 30, 45, and 90 deg abd, IR at 90-deg abd and 90 deg flexion, 10\"x5-6 each.    Reviewed and performed exercises for scapulohumeral rhythm and shoulder girdle strength with bilat ER and Breuger's UE extension with yellow band, facing wall bilat ER at 90 deg flexion, bilat forearm wall slide with yellow band pull apart, and lower trap wall slide with UE lift off. HEP: She is to continue existing HEP. She verbalizes understanding. Treatment/Session Summary:    · Response to Treatment: Still with L shoulder anterior structure hypomobility and shoulder stiffness. Pain to anterior L shoulder, especially with end range ER moves. · Communication/Consultation:  None today  · Equipment provided today:  None today   · Recommendations/Intent for next treatment session: Will continue with L shoulder motion; progress L brachial chain exercises; progress scapulo-cuff strengthening with emphasis on proper mechanics. Treatment Plan of Care Effective Dates:  9/25/2019 TO 11/9/2019 (45 days). Frequency/Duration: 2-3 times a week for 45 Days, then re-assess for need to continue to address discharge goals.       Total Treatment Billable Duration: 45 Minutes  PT Patient Time In/Time Out  Time In: 7550  Time Out: [address] [facility], [facility]    Future Appointments   Date Time Provider Greta Mares   10/17/2019  1:00 PM Dulcy Ricks, PT [facility]   10/21/2019  1:00 PM Dulcy Ricks, PT [facility]   10/24/2019  1:00 PM Dulcy Ricks, PT [facility]   10/28/2019  1:00 PM Dulcy Ricks, PT [facility]   10/31/2019  1:00 PM Dulcy Ricks, PT [facility]   11/4/2019  1:00 PM Dulcy Ricks, PT [facility]   11/21/2019  8:30 AM PVF LAB [facility]   12/3/2019 10:50 AM Andre Carlisle PA [facility]

## 2019-10-17 ENCOUNTER — HOSPITAL ENCOUNTER (OUTPATIENT)
Dept: PHYSICAL THERAPY | Age: 54
Discharge: HOME | End: 2019-10-17
Payer: COMMERCIAL

## 2019-10-17 PROCEDURE — 97110 THERAPEUTIC EXERCISES: CPT

## 2019-10-17 PROCEDURE — 97140 MANUAL THERAPY 1/> REGIONS: CPT

## 2019-10-17 NOTE — PROGRESS NOTES
Theodor Pump  : 1965  Payor: Karly Mendieta / Plan: [employer] HMO/CHOICE PLUS/POS / Product Type: HMO /  [address] [place]  at [facility] KAYCEE SOTELO  [address] [facility], [address], [place] [address].  [phone]   [phone]       OUTPATIENT PHYSICAL THERAPY: Daily Treatment Note 10/17/2019  Visit Count: 8     ICD-10: Treatment Diagnosis: Pain in left shoulder (M25.512); Stiffness of left shoulder, not elsewhere classified (M25.612); Bursitis of left shoulder (M75.52)   Precautions/Allergies: From EMR: Amoxicillin. MD Orders: Eval and Treat; HEP; Strengthening; ROM; \"aggressive strengthening, join as gym member\" (19) MEDICAL/REFERRING DIAGNOSIS: s/p left shoulder EUA Manipulation Arthroscopy ASD ADCR RCR BT   DATE OF ONSET: 19 surgery  REFERRING PHYSICIAN: Alivia Coello MD  RETURN PHYSICIAN APPOINTMENT: 10/29/19       Pre-treatment Symptoms/Complaints: No new complaints. Says she received a letter saying her job let her go. Pain: Initial:   No VAS. Post Session:     Medications Last Reviewed: 10/14/19    Updated Objective Findings:   No new measure. TREATMENT:     Manual Therapies: (30 Minutes): Positional Release technique to L pec major, subscapularis, lat dorsi/teres major for muscle restriction to motion. Glenohumeral joint accessory mobilization for stiffness with PA glide in supine end range ER at 45 and 80 deg abd; AP glide in 45-60 deg horiz abd; shoulder quadrant/inferior glide combo; and prone over pillow PA glide--each grade 3- - to 3- to 4- as pain and spasm allow, 3x30\" each. Physiologic mobilization with reciprocal inhibition in supine to ER at 30, 45, and 80 deg abd, 10\" x5-6 each.      Therapeutic Exercise: (15 Minutes): Exercises for L shoulder motion with guided assisted Active Isolated Stretches in supine to ER at 90 degrees abduction with static arm and active body roll/reach to and away from L arm 2x5; assisted active isolated stretch in L median nerve tension position 3\"x10; instruction and performance in prone over pillow in shoulder 90/90 for passive anterior shoulder stretch in comfortable end range, relaxed hold and with reciprocal inhibition active ER/horiz abd. Exercises for scapulohumeral rhythm and shoulder girdle strength with prone unilat shoulder ext, horiz abd, and scaption with manual guiding and manual stabilization for scapula 3x10 each. HEP: She is to continue existing HEP. Advised to start the prone shoulder 90/90 stretch as able, and to start the prone scapular series with motion control reps. She verbalizes understanding. Treatment/Session Summary:    · Response to Treatment: L shoulder anterior structure hypomobility and shoulder stiffness. · Communication/Consultation:  None today  · Equipment provided today:  None today   · Recommendations/Intent for next treatment session: Will continue with L shoulder motion; progress L brachial chain exercises; progress scapulo-cuff strengthening with emphasis on proper mechanics. Treatment Plan of Care Effective Dates:  9/25/2019 TO 11/9/2019 (45 days). Frequency/Duration: 2-3 times a week for 45 Days, then re-assess for need to continue to address discharge goals.       Total Treatment Billable Duration: 45 Minutes  PT Patient Time In/Time Out  Time In: 1300  Time Out: [address]aleigh Baut[address], PT    Future Appointments   Date Time Provider Greta Mares   10/21/2019  1:00 PM Duncan Duval, PT [facility]   10/24/2019  1:00 PM Duncan Duval, PT [facility]   10/28/2019  1:00 PM Duncan Duval, PT [facility]   10/31/2019  1:00 PM Duncan Duval, PT [facility]   11/4/2019  1:00 PM Duncan Duval, PT [facility]   11/21/2019  8:30 AM PVF LAB [facility]   12/3/2019 10:50 AM Lynnette Carlisle PA [facility]

## 2019-10-21 ENCOUNTER — HOSPITAL ENCOUNTER (OUTPATIENT)
Dept: PHYSICAL THERAPY | Age: 54
Discharge: HOME | End: 2019-10-21
Payer: COMMERCIAL

## 2019-10-21 PROCEDURE — 97110 THERAPEUTIC EXERCISES: CPT

## 2019-10-21 NOTE — PROGRESS NOTES
Rodney Abbott  : 1965  Payor: Bijal Mckeon / Plan: [employer] HMO/CHOICE PLUS/POS / Product Type: HMO /  [address] [employer]  at [facility] KAYCEE SOTELO  [address] [facility], [address], [place] [address].  [phone]   [phone]       OUTPATIENT PHYSICAL THERAPY: Daily Treatment Note 10/21/2019  Visit Count: 9     ICD-10: Treatment Diagnosis: Pain in left shoulder (M25.512); Stiffness of left shoulder, not elsewhere classified (M25.612); Bursitis of left shoulder (M75.52)   Precautions/Allergies: From EMR: Amoxicillin. MD Orders: Eval and Treat; HEP; Strengthening; ROM; \"aggressive strengthening, join as gym member\" (19) MEDICAL/REFERRING DIAGNOSIS: s/p left shoulder EUA Manipulation Arthroscopy ASD ADCR RCR BT   DATE OF ONSET: 19 surgery  REFERRING PHYSICIAN: Sergio Woods MD  RETURN PHYSICIAN APPOINTMENT: 10/29/19       Pre-treatment Symptoms/Complaints: Says her shoulder is doing \"ok\". Not too sore today. Has been able to use it pretty well over the weekend. Cooked chili without issue. Still gets pain/soreness to it, but it is better. Pain is down in to the upper arm. Has been trying to work on the HEP we discussed last time. The prone stretch hurts.    Pain: Initial:   1-2/10 now, 4/10 worst  Post Session:  2/10   Medications Last Reviewed: 10/21/19    Updated Objective Findings:   ROM:   LUE AROM  L Shoulder Flexion: 150(forward elevation)  L Shoulder Internal Rotation: 30(at 90 deg abd; to L2 behind back.)  L Shoulder External Rotation: 70(by side, 75 at 90 deg abd)  LUE PROM  L Shoulder Flexion: 155(forward elevation)  L Shoulder ABduction: 90(with scapula stabilized)  L Shoulder Internal Rotation: 65(stabilized at 45 adn 80 deg abd)  L Shoulder External Rotation: 75(in shoulder neutral, 70 at 45 and 90 deg abd)                    Strength:   L Shoulder: strong with pain to IR>Flex, strong and painless to remainder with soft tissue differential testing; grossly 4/5 to each with manual testing; L Middle Trap: 3+ (in prone). TREATMENT:     Therapeutic Exercise: (45 Minutes): Exercises for L shoulder motion with guided assisted Active Isolated Stretches in supine to ER at 30, 45, and 90 degrees abduction, IR at 45 and 90 degrees abduction, ER and IR at 90 degrees flexion, forward elevation, horizontal abduction/adduction, D1 and D2 flexion--3\"x10 each; then in standing to ER by side and 90 degrees abduction, IR at 90 degrees abduction, forward elevation, and extension/IR/adduction behind the back--3\"x10 each. Reviewed and performed prone over pillow in shoulder 90/90 for passive anterior shoulder stretch. Cues to modify positioning for end range position with low pain/soreness. Good understanding. Exercises for scapulohumeral rhythm and shoulder girdle strength with prone unilat shoulder ext 1# 2x15, horiz abd with manual guiding 3x10, and scaption 1# 3x10; added prone ER and IR 2x10 each with manual guiding and manual stabilization of scapula for each; standing facing wall bilat ER at 90 deg flexion with yellow band, 3x10; facing wall bilat forearm wall slide with yellow band pull apart 3x10; and wall slide scaption+lower trap lift off with red tubing 3x5. Verbal, visual, and manual guiding cues for corrected movement mechanics. HEP: She is to continue existing HEP. She verbalizes understanding. Treatment/Session Summary:    · Response to Treatment: Anterior glenohumeral stiffness, but ROM is improved. Pain with resisted subscapularis and end range ER position. Pain and weak to middle trap still, but doing better with scapulohumeral rhythm. · Communication/Consultation:  None today  · Equipment provided today:  None today   · Recommendations/Intent for next treatment session: Will continue with L shoulder motion; progress L brachial chain exercises; progress scapulo-cuff strengthening with emphasis on proper mechanics.     Treatment Plan of Care Effective Dates:  9/25/2019 TO 11/9/2019 (45 days). Frequency/Duration: 2-3 times a week for 45 Days, then re-assess for need to continue to address discharge goals.       Total Treatment Billable Duration: 45 Minutes  PT Patient Time In/Time Out  Time In: 1162  Time Out: 1201 Scot Bain, PT    Future Appointments   Date Time Provider Greta Mares   10/24/2019  1:00 PM [facility]   10/28/2019  1:00 PM LIONEL Eisenberg [facility]   10/31/2019  1:00 PM LIONEL [facility]   11/4/2019  1:00 PM LIONEL Eisenberg [facility]   11/21/2019  8:30 AM PVF LAB TILA PVF PVD   12/3/2019 10:50 AM Yissel Carlisle PA SSA PVF PVD

## 2019-10-24 ENCOUNTER — HOSPITAL ENCOUNTER (OUTPATIENT)
Dept: PHYSICAL THERAPY | Age: 54
Discharge: HOME | End: 2019-10-24
Payer: COMMERCIAL

## 2019-10-24 PROCEDURE — 97110 THERAPEUTIC EXERCISES: CPT

## 2019-10-24 PROCEDURE — 97140 MANUAL THERAPY 1/> REGIONS: CPT

## 2019-10-24 NOTE — PROGRESS NOTES
Yao Dickerson  : 1965  Payor: Micheal Rush / Plan: [address] HMO/CHOICE PLUS/POS / Product Type: HMO /  [address] [place]  at [facility] KAYCEE SOTELO  [address] [facility], [address], [place] [address].  [phone]   [phone]       OUTPATIENT PHYSICAL THERAPY: Daily Treatment Note 10/24/2019  Visit Count: 10     ICD-10: Treatment Diagnosis: Pain in left shoulder (M25.512); Stiffness of left shoulder, not elsewhere classified (M25.612); Bursitis of left shoulder (M75.52)   Precautions/Allergies: From EMR: Amoxicillin. MD Orders: Eval and Treat; HEP; Strengthening; ROM; \"aggressive strengthening, join as gym member\" (19) MEDICAL/REFERRING DIAGNOSIS: s/p left shoulder EUA Manipulation Arthroscopy ASD ADCR RCR BT   DATE OF ONSET: 19 surgery  REFERRING PHYSICIAN: Phillip Zarate MD  RETURN PHYSICIAN APPOINTMENT: 10/29/19       Pre-treatment Symptoms/Complaints: Says she almost did not come in today secondary to pain. Says had a hard time getting her arm comfortable last night, pain waking her, and pain this morning at \"7/10\". It is better now, but still not as good. No significant issue the past few nights or through the day yesterday. Pain: Initial:   3/10   Post Session: 1- 2/10   Medications Last Reviewed: 10/21/19    Updated Objective Findings:   See progress report. TREATMENT:     Manual Therapies: (25 Minutes): Positional Release technique to L upper trap, pec major subclavicular and at coracoid, subscapularis, and rhomboids for muscle restrictions. Low grade joint mobilization for pain to L shoulder girdle, glenohumeral long axis traction, and distraction; in side-lying, L scapulothoracic mobilization grade 3- - ot 3-, and attempts at scapular distraction.    Therapeutic Exercise: (15 Minutes): Exercises for L shoulder motion with guided assisted Active Isolated Stretches in supine to ER at 30, 45, and 90 degrees abduction, IR at 45 and 90 degrees abduction, ER and IR at 90 degrees flexion, forward elevation, horizontal abduction/adduction, D1 and D2 flexion--3\"x10 each. In side-lying, shoulder IR/ER with hand on hip+manual guiding of scapula, 3x10; AAROM forward reach to behind back 3x10. AROM in standing to forward elevation and behind back extension with manual guiding. HEP: She is to continue existing HEP as able. She verbalizes understanding. Treatment/Session Summary:    · Response to Treatment: Increased pain reported over the night. Increased muscle tone noted around shoulder girdle, especially to pecs. Improved with treatment. Pain improved with treatment. Good shoulder motion noted with much improved behind back reaching. · Communication/Consultation:  None today  · Equipment provided today:  None today   · Recommendations/Intent for next treatment session: Will continue with L shoulder motion; progress L brachial chain exercises; progress scapulo-cuff strengthening with emphasis on proper mechanics. Treatment Plan of Care Effective Dates:  9/25/2019 TO 11/9/2019 (45 days). Frequency/Duration: 2-3 times a week for 45 Days, then re-assess for need to continue to address discharge goals.       Total Treatment Billable Duration: 40 Minutes  PT Patient Time In/Time Out  Time In: 1310  Time Out: [address] [facility] [address], PT    Future Appointments   Date Time Provider Greta Mares   10/28/2019  1:00 PM David Sessions, PT [facility]   10/31/2019  1:00 PM David Sessions, PT [facility]   11/4/2019  1:00 PM David Sessions, PT [facility]   11/21/2019  8:30 AM PVF LAB TILA [facility] PVD   12/3/2019 10:50 AM Karolina Carlisle PA SSA PVF PVD

## 2019-10-25 NOTE — PROGRESS NOTES
Inés Jiangp  : 1965  Primary: [address] [facility] Hmo/c*  Secondary:  [address]  at [facility] KAYCEE SOTELO  [address] [facility], [address] [place] [address] [address], [address] [facility]  [phone]   [phone]       OUTPATIENT PHYSICAL THERAPY:Progress Report 10/24/2019     ICD-10: Treatment Diagnosis: Pain in left shoulder (M25.512); Stiffness of left shoulder, not elsewhere classified (M25.612); Bursitis of left shoulder (M75.52)   Precautions/Allergies: From EMR: Amoxicillin. MD Orders: Eval and Treat; HEP; Strengthening; ROM; \"aggressive strengthening, join as gym member\" (19) MEDICAL/REFERRING DIAGNOSIS: s/p left shoulder EUA Manipulation Arthroscopy ASD ADCR RCR BT   DATE OF ONSET: 19 surgery  REFERRING PHYSICIAN: Shaan Tinoco MD  RETURN PHYSICIAN APPOINTMENT: 10/29/19     PROGRESS ASSESSMENT:  Ms. Melanie Gilmore has attended 10 of 10 scheduled visits to date. She is now 5 months s/p L shoulder scope with subacromial decompression, arthroscopic distal clavicle resection, extensive debridement of a type 1 SLAP tear, glenohumeral joint, and subacromial space. She has been making good progress in shoulder passive and active ROM. Pain persists, but improved. She continues to have pain at night primarily, and persistent muscle guarding with the pain. Scapulohumeral rhythm with humeral moves is improving. She continues to demonstrate anterior glenohumeral stiffness, pain to anterior superior shoulder that radiates to anterior humerus, shoulder girdle weakness, and scapular dyskinsis. These impairments continue to limit functional use of her L/dominant UE and comfort with ADL's and sleep. She is making progress toward her goals. We will plan to continue with her existing treatment plan. PROBLEM LIST (Impacting functional limitations):  1. L shoulder/UE pain  2. Decreased L shoulder ROM   3.  Weakness L shoulder INTERVENTIONS PLANNED: (Treatment may consist of any combination of the following)  1. Thermal and electric modalities, manual therapies, taping, functional dry needling as needed for pain and muscle dysfucntions  2. Manual therapies, therapeutic exercises, HEP for ROM    3. Therapeutic exercises and HEP for strength   TREATMENT PLAN:  Effective Dates: 9/25/2019 TO 11/9/2019 (45 days). Frequency/Duration: 2-3 times a week for 45 Days, then re-assess for need to continue to address discharge goals. GOALS: (Goals have been discussed and agreed upon with patient.)  Short-Term Functional Goals: Time Frame: 6 weeks  4. Report no more than 4/10 intermittent pain to L shoulder girdle during basic functional activities, and score less than 25% on the DASH. Ongoing 10/24/19  5. R shoulder PROM forward elevation greater than 150 degrees and external rotation greater than 75 degrees to progress into her normalized ranges. Met 10/24/19  6. Demonstrate good shoulder girdle and cuff strength with manual testing to progress into strength phase. Met 10/24/19  7. Independent with a basic shoulder HEP. Met 10/24/19  Discharge Goals: Time Frame: 12 weeks  4. No more than 3/10 intermittent pain L shoulder with return to normalized household and work activities, and score less than 15% on the DASH. 5. L shoulder AROM forward elevation greater than 150 degrees, external rotation greater than 80 degrees, and strength to shoulder are grossly WNL's for safe use with normalized activities. 6. Demonstrate good functional shoulder strength and endurance for return to normalized household and work activities. 7. Independent with advanced shoulder HEP for continued self-management.     Updated Objective Findings:  ROM:   LUE AROM  L Shoulder Flexion: 150(forward elevation)  L Shoulder Internal Rotation: 30(at 90 deg abd; to L2 behind back.)  L Shoulder External Rotation: 70(by side, 75 at 90 deg abd)  LUE PROM  L Shoulder Flexion: 155(forward elevation)  L Shoulder ABduction: 90(with scapula stabilized)  L Shoulder Internal Rotation: 65(stabilized at 45 adn 80 deg abd)  L Shoulder External Rotation: 75(in shoulder neutral, 70 at 45 and 90 deg abd)              Strength:   L Shoulder: strong with pain to IR>Flex, strong and painless to remainder with soft tissue differential testing; grossly 4/5 to each with manual testing; L Middle Trap: 3+ (in prone). MEDICAL NECESSITY:   · L/non-dominant shoulder pain, stiffness, and weakness impairments at 5 months post op are limiting comfort, basic use of the UE, and ability to return to work activites. · She is 5 months post op with persistent symptoms. REASON FOR SERVICES/OTHER COMMENTS:  · As above. · Making good progress in shoulder motion and function.    · Pain limits still   Rehabilitation Potential For Stated Goals: Good Corrie Goldmann, PT, MSPT, OCS

## 2019-10-28 ENCOUNTER — HOSPITAL ENCOUNTER (OUTPATIENT)
Dept: PHYSICAL THERAPY | Age: 54
Discharge: HOME | End: 2019-10-28
Payer: COMMERCIAL

## 2019-10-28 PROCEDURE — 97110 THERAPEUTIC EXERCISES: CPT

## 2019-10-28 NOTE — PROGRESS NOTES
Rae Marquez  : 1965  Payor: Jordan Yeboah / Plan: [address] HMO/CHOICE PLUS/POS / Product Type: HMO /  [address] [place]  at [facility] KAYCEE SOTELO  [address] [facility], [address], [address] [facility]  [phone]   [phone]       OUTPATIENT PHYSICAL THERAPY: Daily Treatment Note 10/28/2019  Visit Count: 11     ICD-10: Treatment Diagnosis: Pain in left shoulder (M25.512); Stiffness of left shoulder, not elsewhere classified (M25.612); Bursitis of left shoulder (M75.52)   Precautions/Allergies: From EMR: Amoxicillin. MD Orders: Eval and Treat; HEP; Strengthening; ROM; \"aggressive strengthening, join as gym member\" (19) MEDICAL/REFERRING DIAGNOSIS: s/p left shoulder EUA Manipulation Arthroscopy ASD ADCR RCR BT   DATE OF ONSET: 19 surgery  REFERRING PHYSICIAN: Jose Teague MD  RETURN PHYSICIAN APPOINTMENT: 10/29/19       Pre-treatment Symptoms/Complaints: Says her shoulder is doing \"pretty good\" today. It was better this weekend. No more than a little soreness to it now. .  Pain: Initial:   <1/10   Post Session: 1- 2/10   Medications Last Reviewed: 10/2819    Updated Objective Findings:   OUTCOME MEASURE:   Tool Used: Disabilities of the Arm, Shoulder and Hand (DASH) Questionnaire - Quick Version  Score:  Initial: 26/55 or 34% disability Most Recent: 24/55 or 30% disability (10/28/19)   Interpretation of Score: The DASH is designed to measure the activities of daily living in person's with upper extremity dysfunction or pain. Each section is scored on a 1-5 scale, 5 representing the greatest disability. The scores of each section are added together for a total score of 55. This number is divided by 11, followed by subtracting 1 and multiplying by 25 to get a percent score of disability.  This value represents the percentage disability: 0-20% minimal disability; 20-40% moderate disability; 40-60% severe disability; % dependent for care or exaggerated symptom behavior. Minimal detectable change is 12%. TREATMENT:     Therapeutic Exercise: (45 Minutes): Exercises for L shoulder motion and shoulder girdle strength. UBE for active warm up and endurance, level 2 x6'. Guided assisted Active Isolated Stretches in supine to ER at 30, 45, and 90 degrees abduction, IR at 45 and 90 degrees abduction, ER and IR at 90 degrees flexion, forward elevation, horizontal abduction/adduction, D1 and D2 flexion--3\"x10 each; reciprocal inhibition stretch to ER and IR positions, 10\"x5 each. Scapular and cuff muscle activation with prone unilat ext, horiz abd, and scaption with 1# 3x10 each; prone ER and IR with 1# 3x10 each. Tactile cues for movement pattern correction and scapular stabilization for each. HEP: She is to continue existing HEP as able. She verbalizes understanding. Treatment/Session Summary:    · Response to Treatment: Improved symptom report and improved shoulder motion today. Mild stiffness persists to glenohumeral joint, especially anterior capsule tightness. Weak to shoulder with pain weakness still to IR. Active movement to humeral elevation is improved. · Communication/Consultation:  None today  · Equipment provided today:  None today   · Recommendations/Intent for next treatment session: She has a follow up with Dr. Deanna Unger tomorrow. We will continue with L shoulder motion; progress L brachial chain exercises; progress scapulo-cuff strengthening with emphasis on proper mechanics. Her insurance coverage runs out after next visits. Will discuss transition to post hap program vs formal PT. Treatment Plan of Care Effective Dates:  9/25/2019 TO 11/9/2019 (45 days). Frequency/Duration: 2-3 times a week for 45 Days, then re-assess for need to continue to address discharge goals.       Total Treatment Billable Duration: 45 Minutes  PT Patient Time In/Time Out  Time In: 1300  Time Out: [address], PT    Future Appointments   Date Time Provider Greta Mares   10/31/2019  1:00 PM Camron Ch, PT [facility]   11/4/2019  1:00 PM Alyson Roy, LIONEL [facility]   11/21/2019  8:30 AM PVF LAB SSA PVF PVD   12/3/2019 10:50 AM Steven Carlisle Caro, PA [facility]F PVD

## 2019-10-31 ENCOUNTER — HOSPITAL ENCOUNTER (OUTPATIENT)
Dept: PHYSICAL THERAPY | Age: 54
Discharge: HOME | End: 2019-10-31
Payer: COMMERCIAL

## 2019-10-31 PROCEDURE — 97110 THERAPEUTIC EXERCISES: CPT

## 2019-10-31 NOTE — PROGRESS NOTES
Rae Marquez  : 1965  Payor: Jordan Yeboah / Plan: [address] HMO/CHOICE PLUS/POS / Product Type: HMO /  [address]  at [facility] KAYCEE SOTELO  [address] [facility], [address], [place] [address].  [phone]   [phone]       OUTPATIENT PHYSICAL THERAPY: Daily Treatment Note 10/31/2019  Visit Count: 12     ICD-10: Treatment Diagnosis: Pain in left shoulder (M25.512); Stiffness of left shoulder, not elsewhere classified (M25.612); Bursitis of left shoulder (M75.52)   Precautions/Allergies: From EMR: Amoxicillin. MD Orders: Eval and Treat; HEP; Strengthening; ROM; \"aggressive strengthening, join as gym member\" (19) MEDICAL/REFERRING DIAGNOSIS: s/p left shoulder EUA Manipulation Arthroscopy ASD ADCR RCR BT   DATE OF ONSET: 19 surgery  REFERRING PHYSICIAN: John Garcia MD  RETURN PHYSICIAN APPOINTMENT: no follow up. Reports having her follow up with Dr. Rom Salomon on Tuesday. He is pleased with her progress and has released her. She is to return to him only if needed. Pre-treatment Symptoms/Complaints: Says her shoulder is doing \"pretty good\". Shoulder pain is doing better. Still with pain at night, but not as much. Using heat and ball soft tissue mobs at home. These help. .  Pain: Initial:   <1/10   Post Session: 1- 2/10   Medications Last Reviewed: 10/2819    Updated Objective Findings:   OUTCOME MEASURE:   Tool Used: Disabilities of the Arm, Shoulder and Hand (DASH) Questionnaire - Quick Version  Score:  Initial: 26/55 or 34% disability Most Recent: 24/55 or 30% disability (10/28/19)   Interpretation of Score: The DASH is designed to measure the activities of daily living in person's with upper extremity dysfunction or pain. Each section is scored on a 1-5 scale, 5 representing the greatest disability. The scores of each section are added together for a total score of 55.   This number is divided by 11, followed by subtracting 1 and multiplying by 25 to get a percent score of disability. This value represents the percentage disability: 0-20% minimal disability; 20-40% moderate disability; 40-60% severe disability; % dependent for care or exaggerated symptom behavior. Minimal detectable change is 12%. TREATMENT:     UBE for active warm up and endurance, level 2 x10'. Therapeutic Exercise: (45 Minutes): Exercises for L shoulder motion and shoulder girdle strength.with instruction exercise sequence with gym program transition:   shoulder motion with arm hang rotations with 5# x30; wand ext, ext/add x10 each, and stick, progressing to ball, pass behind back 3x10;   Muscle Balance moves for upper quarter, including standing with back against wall bilateral ER in shoulder neutral, ER and IR in abduction (close to 90 deg as possible), ER at 90 deg flexion, facing wall ER at 90-deg flex with red band pull apart, facing wall bilat forearm slide with red band pull apart, back to wall serratus lift with red band, and reverse shoulder flexion, 3\"x10 each with emphasis on stable spine/scapula with mobile shoulder and disassociation of movement at shoulder. Shoulder girdle strength with prone unilat shoulder extension 2# 2x15, horiz abd 1# 3x10 with manual assists, unilat scaption 1# 2x15; prone unilat ER and IR 1# 2x15 each; supine press 3# x15, 5# x15; bent row with red band pull apart 3# x15, 5# x15; trial with dead lift 1x10 30# bar, and kettle bell squat/swing with 5# 1x10; shoulder forward elevation with red band resisted ER/Abd 2x10 with manual assist to L scapula through motion. HEP: No new HEP. Treatment/Session Summary:    · Response to Treatment: Good performance of the exercises today. Mild stiffness and weakness persist to the L shoulder. Prone middle trap move continues to be weak, but better quality and sequencing. She has been release by Dr. Jonah Morales and has completed her allowed insurance visits.  She feels comfortable with transition to gym program.   · Communication/Consultation:  None today  · Equipment provided today:  None today   · Recommendations/Intent for next treatment session:  Will plan to discharge her to our post-hap gym program.     Treatment Plan of Care Effective Dates:  9/25/2019 TO 11/9/2019 (45 days). Frequency/Duration: 2-3 times a week for 45 Days, then re-assess for need to continue to address discharge goals.       Total Treatment Billable Duration: 45 Minutes  PT Patient Time In/Time Out  Time In: 1310  Time Out: [address] [facility], PT    Future Appointments   Date Time Provider Greta Mares   11/4/2019  1:00 PM Lakhwinder De Los Santos, PT [facility]   11/21/2019  8:30 AM PVF LAB TILA PVF PVD   12/3/2019 10:50 AM Talita Carlisle PA SSA PVF PVD

## 2019-10-31 NOTE — THERAPY DISCHARGE
Moe Uvaldogloria Santamaria : 1965 Primary: [facility]* Secondary:  [facility] KAYCEE SOTELO [address] [facility], [address] [place] [address] [address], [address] [facility] [phone]   [phone] OUTPATIENT PHYSICAL THERAPY:Progress Report 10/31/2019 ICD-10: Treatment Diagnosis: Pain in left shoulder (M25.512); Stiffness of left shoulder, not elsewhere classified (M25.612); Bursitis of left shoulder (M75.52) Precautions/Allergies: From EMR: Amoxicillin. MD Orders: Eval and Treat; HEP; Strengthening; ROM; \"aggressive strengthening, join as gym member\" (19) MEDICAL/REFERRING DIAGNOSIS: s/p left shoulder EUA Manipulation Arthroscopy ASD ADCR RCR BT  
DATE OF ONSET: 19 surgery REFERRING PHYSICIAN: Ericka Sigala MD 
RETURN PHYSICIAN APPOINTMENT: 10/29/19 DISCHARGE ASSESSMENT:  Ms. Gerry Obrien has attended 15 of 12 scheduled visits to date. She is now 5 months s/p L shoulder scope with subacromial decompression, arthroscopic distal clavicle resection, extensive debridement of a type 1 SLAP tear, glenohumeral joint, and subacromial space. She has made good progress in shoulder passive and active ROM. Pain to the anterior shoulder persists, but is improved. Scapulohumeral rhythm with humeral moves is improving. She continues to demonstrate anterior glenohumeral stiffness, pain to anterior superior shoulder that radiates to anterior humerus, shoulder girdle weakness, and scapular dyskinsis. She has made progress toward her discharge goals as set, but not fully achieved. The Post-Hab Gym program was recommended and she is a agreeable make this transition. Short-Term Functional Goals: Time Frame: 6 weeks 1. Report no more than 4/10 intermittent pain to L shoulder girdle during basic functional activities, and score less than 25% on the DASH. Ongoing 10/24/19 2.  R shoulder PROM forward elevation greater than 150 degrees and external rotation greater than 75 degrees to progress into her normalized ranges. Met 10/24/19 3. Demonstrate good shoulder girdle and cuff strength with manual testing to progress into strength phase. Met 10/24/19 
4. Independent with a basic shoulder HEP. Met 10/24/19 Discharge Goals: Time Frame: 12 weeks 1. No more than 3/10 intermittent pain L shoulder with return to normalized household and work activities, and score less than 15% on the DASH. Not fully met 10/31/19 2. L shoulder AROM forward elevation greater than 150 degrees, external rotation greater than 80 degrees, and strength to shoulder are grossly WNL's for safe use with normalized activities. Progressed, not fully met 10/31/19 3. Demonstrate good functional shoulder strength and endurance for return to normalized household and work activities. Progressed, not fully met 10/31/19 
4. Independent with advanced shoulder HEP for continued self-management. Met 10/31/19 Updated Objective Findings: 
ROM:  
LUE AROM 
L Shoulder Flexion: 150(forward elevation) L Shoulder Internal Rotation: 30(at 90 deg abd; to L2 behind back.) L Shoulder External Rotation: 70(by side, 75 at 90 deg abd) LUE PROM 
L Shoulder Flexion: 155(forward elevation) L Shoulder ABduction: 90(with scapula stabilized) L Shoulder Internal Rotation: 65(stabilized at 45 adn 80 deg abd) L Shoulder External Rotation: 75(in shoulder neutral, 70 at 45 and 90 deg abd)   
  
   
  
Strength: L Shoulder: strong with pain to IR>Flex, strong and painless to remainder with soft tissue differential testing; grossly 4/5 to each with manual testing; L Middle Trap: 3+ (in prone). OUTCOME MEASURE:  
Tool Used: Disabilities of the Arm, Shoulder and Hand (DASH) Questionnaire - Quick Version Score:  Initial: 26/55 or 34% disability Most Recent: 24/55 or 30% disability (10/28/19) Interpretation of Score: The DASH is designed to measure the activities of daily living in person's with upper extremity dysfunction or pain.  Each section is scored on a 1-5 scale, 5 representing the greatest disability.  The scores of each section are added together for a total score of 55.  This number is divided by 11, followed by subtracting 1 and multiplying by 25 to get a percent score of disability. This value represents the percentage disability: 0-20% minimal disability; 20-40% moderate disability; 40-60% severe disability; % dependent for care or exaggerated symptom behavior.  Minimal detectable change is 12%. RECOMMENDATION: I will plan to discharge Hugo Villaseñor from Physical Therapy at this time. She is to continue with her HEP as instructed, and our Post-Hab Gym progem. I will be happy to follow up with him/her if there is a need for Physical Therapy in the future. Thank you for the opportunity to be a part of her care.  
 
Holly Jiang, PT, MSPT, OCS

## 2019-11-04 ENCOUNTER — HOSPITAL ENCOUNTER (OUTPATIENT)
Dept: PHYSICAL THERAPY | Age: 54
Discharge: HOME | End: 2019-11-04

## 2019-12-03 PROBLEM — G89.29 CHRONIC LEFT SHOULDER PAIN: Status: ACTIVE | Noted: 2019-12-03

## 2019-12-03 PROBLEM — M25.512 CHRONIC LEFT SHOULDER PAIN: Status: ACTIVE | Noted: 2019-12-03

## 2020-01-08 ENCOUNTER — HOSPITAL ENCOUNTER (OUTPATIENT)
Dept: MAMMOGRAPHY | Age: 55
Discharge: HOME | End: 2020-01-08

## 2020-01-08 DIAGNOSIS — Z12.31 SCREENING MAMMOGRAM, ENCOUNTER FOR: ICD-10-CM

## 2020-06-02 PROBLEM — J45.909 ASTHMA DUE TO ENVIRONMENTAL ALLERGIES: Status: ACTIVE | Noted: 2020-06-02

## 2020-12-09 ENCOUNTER — TRANSCRIBE ORDER (OUTPATIENT)
Dept: SCHEDULING | Age: 55
End: 2020-12-09

## 2020-12-09 DIAGNOSIS — Z12.31 VISIT FOR SCREENING MAMMOGRAM: Primary | ICD-10-CM

## 2021-01-11 ENCOUNTER — HOSPITAL ENCOUNTER (OUTPATIENT)
Dept: MAMMOGRAPHY | Age: 56
Discharge: HOME | End: 2021-01-11
Attending: PHYSICIAN ASSISTANT

## 2021-01-11 DIAGNOSIS — Z12.31 VISIT FOR SCREENING MAMMOGRAM: ICD-10-CM

## 2021-01-14 ENCOUNTER — HOSPITAL ENCOUNTER (OUTPATIENT)
Dept: MAMMOGRAPHY | Age: 56
Discharge: HOME | End: 2021-01-14
Attending: PHYSICIAN ASSISTANT
Payer: COMMERCIAL

## 2021-01-14 DIAGNOSIS — R92.8 ABNORMAL SCREENING MAMMOGRAM: ICD-10-CM

## 2021-01-14 PROCEDURE — 77065 DX MAMMO INCL CAD UNI: CPT

## 2021-12-14 ENCOUNTER — HOSPITAL ENCOUNTER (OUTPATIENT)
Dept: PHYSICAL THERAPY | Age: 56
Discharge: HOME | End: 2021-12-14
Attending: PHYSICIAN ASSISTANT

## 2021-12-14 NOTE — PROGRESS NOTES
Jose Santamaria  : 1965  Primary: [address] [facility] Hmo/c*  Secondary:  [address] at [place] [id number] [facility], [address] [place] [address],[address], [facility] [address]  [phone]   [phone]          OUTPATIENT DAILY NOTE    NAME/AGE/GENDER: April Fong is a 49 y.o. female. DATE: 2021    Patient cancelled (less than 24 hours ago) her initial evaluation appointment today due to accidentally went to [facility].       Zev Storm PT    Future Appointments   Date Time Provider Greta Mares   12/15/2021  9:30 AM Henrietta Bedoya PT [facility]   2022  8:45 AM PVF LAB TILA [facility] PVD   2022  8:30 AM Amelia Carlisle PA SSA PVF PVD

## 2021-12-15 ENCOUNTER — HOSPITAL ENCOUNTER (OUTPATIENT)
Dept: PHYSICAL THERAPY | Age: 56
Discharge: HOME | End: 2021-12-15
Attending: PHYSICIAN ASSISTANT
Payer: COMMERCIAL

## 2021-12-15 PROCEDURE — 95992 CANALITH REPOSITIONING PROC: CPT

## 2021-12-15 PROCEDURE — 97161 PT EVAL LOW COMPLEX 20 MIN: CPT

## 2021-12-15 NOTE — THERAPY EVALUATION
89}  Alejandro Santamaria  : 1965  Primary: [address] [facility] Hmo/c*  Secondary:  [address]  at [facility] [address] [facility] [place], [place] [address] [address] [facility]  [phone]   [phone]       OUTPATIENT PHYSICAL THERAPY: Initial Assessment 12/15/2021   ICD-10: Treatment Diagnosis:   Dizziness & giddiness (R42)  Benign paroxysmal positional vertigo, left ear (H81.12)    Precautions/Allergies:   Amoxicillin   TREATMENT PLAN:  Effective Dates: 12/15/2021 TO 3/15/2022 (90 days)  Frequency/Duration: 1-2 times per week for 90 days MEDICAL/REFERRING DIAGNOSIS:  Benign paroxysmal vertigo, left ear [H81.12]     DATE OF ONSET: Chronic    REFERRING PHYSICIAN: Brie eNlson*  MD Orders: Evaluate & Treat  Return MD Appointment: TBD     INITIAL ASSESSMENT: Ms. Lois Barrera presents to physical therapy with complaints of dizziness secondary to vertigo. She reports decreased activity tolerance due to her impairments. She demonstrated improvement with symptom reduction with canalith repositioning techniques during today's assessment. She could benefit from physical therapy services to address her deficits and optimize her functional mobility. She demonstrates good understanding of patient education & instruction surrounding her diagnosis. PROBLEM LIST:   1. Decreased Activity Tolerance  2. Decreased ADL/Functional Activities  3. Decreased Transfer Abilities  4. Decreased Balance  5. Decreased [place] with Home Exercise Program INTERVENTIONS PLANNED:   1. Balance Exercise  2. Bed Mobility  3. Cold  4. Electrical Stimulation  5. Family Education  6. Gait Training  7. Heat  8. Home Exercise Program (HEP)  9. Manual Therapy  10. Neuromuscular Re-education/Strengthening  11. Range of Motion (ROM)  12. Therapeutic Activities  13. Therapeutic Exercise/Strengthening  14. Transfer Training     GOALS:  Discharge Goals: Time Frame: 6 weeks  1.  Patient will improve DHI score by 12 points to demonstrate gains in function. 2. Patient will report decreased frequency of symptoms by 50%. 3. Patient will verbalize headache less than or equal to 2/10.  4. Patient will demonstrate understanding of HEP independently. OUTCOME MEASURES:     Tool Used: Dizziness Handicap Index  Score:  Initial: 34/100 (Date: 12/15/21) Most Recent: X (Date: --)   Interpretation of Score: To each item, the following scores may be assigned: No = 0, Sometimes = 2, Yes = 4. Scores greater than 10 points should be referred to balance specialists for further evaluation. MEDICAL NECESSITY:   · Patient is expected to demonstrate progress in functional mobility to increase independence and safety. REASON FOR SERVICES/OTHER COMMENTS:  · Patient continues to require skilled intervention due to decreased independence with functional mobility. Rehabilitation Potential For Stated Goals: GOOD     Regarding Carmela Santamaria's therapy, I certify that the treatment plan above will be carried out by a therapist or under their direction. Thank you for this referral,    John Calvin, PT, DPT    Referring Physician Signature: Jaswinder Mandel _______________________________ Date _____________      HISTORY:   History of Injury/Illness (Reason for Referral):  Patient presents to physical therapy with complaints of dizziness. She states she has suffered from vertigo for the past 5-6 years with insidious onset. She states that her symptoms come and go and will generally last for a few days in duration. She says her most recent episode was last week and it was \"pretty horrible. \" She states that she feels like the \"room is spinning\" and that she has a headache behind her eyes on the R side of her forehead. She states that \"getting dressed\" and \"bending over\" is difficult. She also notes difficulty with driving when she has to rotate her head to the L more so than the R.  She reports she vomited 2x last week due to her dizziness. She states that she is unsure what provokes her symptoms. She states that her hydration is poor and that her MD has informed her of that previously. She states that stress could be a factor in why she's been experiencing dizziness. Prior Level of Function/Work/Activity:  Independent with functional mobility and ADLs    Past Medical History/Comorbidities:   Active Ambulatory Problems     Diagnosis Date Noted    Controlled type 2 diabetes mellitus without complication, without long-term current use of insulin ([facility] [address]     Hypothyroidism, adult     History of kidney stones     Benign hematuria--Dr. Jacques Minium 08/19/2015    Anxiety 09/02/2015    Depression, controlled 09/02/2015    Dyslipidemia 02/10/2016    Urinary, incontinence, stress female 05/09/2016    Calculus of gallbladder without cholecystitis 06/21/2017    Stress headaches 12/19/2017    Anatomical narrow angle borderline glaucoma, bilateral 12/21/2017    Arthralgia of both hands 12/13/2018    History of endometrial ablation 12/13/2018    Class 2 severe obesity due to excess calories with serious comorbidity and body mass index (BMI) of 38.0 to 38.9 in adult [facility]) 05/08/2019    Degenerative joint disease of left acromioclavicular joint 05/18/2019    Superior glenoid labrum lesion of left shoulder 05/18/2019    Bursitis of left shoulder 05/24/2019    Chronic left shoulder pain 12/03/2019    Asthma due to environmental allergies 06/02/2020     Resolved Ambulatory Problems     Diagnosis Date Noted    No Resolved Ambulatory Problems     Past Medical History:   Diagnosis Date    Adverse effect of anesthesia        Ambulatory/Rehab Services H2 Model Falls Risk Assessment   Risk Factors:       (1)  Dizziness/Vertigo Ability to Rise from Chair:       (0)  Ability to rise in a single movement   Falls Prevention Plan:       No modifications necessary   Total: (5 or greater = High Risk): 1   ©2010 [employer]. All Rights Reserved.  [place] Patent #[id number],610. Federal Law prohibits the replication, distribution or use without written permission from [facility]   Current Medications:       Current Outpatient Medications:     omega 3-dha-epa-fish oil (Fish OiL) 120-180-500 mg cap, Take  by mouth., Disp: , Rfl:     semaglutide (Ozempic) 0.25 mg or 0.5 mg/dose (2 mg/1.5 ml) subq pen, 0.5 mg by SubCUTAneous route every seven (7) days. , Disp: 1 Box, Rfl: 11    levothyroxine (SYNTHROID) 50 mcg tablet, Take 1 Tablet by mouth Daily (before breakfast). , Disp: 90 Tablet, Rfl: 3    rosuvastatin (CRESTOR) 5 mg tablet, Take 1 Tablet by mouth nightly., Disp: 90 Tablet, Rfl: 3    albuterol (PROVENTIL HFA, VENTOLIN HFA, PROAIR HFA) 90 mcg/actuation inhaler, Take 1 Puff by inhalation every six (6) hours as needed for Wheezing., Disp: 1 Each, Rfl: 1    meclizine (ANTIVERT) 12.5 mg tablet, Take 1 Tablet by mouth three (3) times daily as needed for Dizziness or Nausea for up to 10 days. , Disp: 30 Tablet, Rfl: 0    Insulin Needles, Disposable, 31 gauge x 5/16\" ndle, For administration of ozempic Q 7 days, Disp: 1 Package, Rfl: 1    vitamin e (E GEMS) 1,000 unit capsule, Take 1,000 Units by mouth daily. (Patient not taking: Reported on 12/8/2021), Disp: , Rfl:     b complex vitamins tablet, Take 1 Tablet by mouth daily. , Disp: , Rfl:     loratadine (CLARITIN) 10 mg tablet, Take 10 mg by mouth., Disp: , Rfl:     lisinopriL (PRINIVIL, ZESTRIL) 5 mg tablet, TAKE ONE TABLET BY MOUTH ONE TIME DAILY AT NIGHT, Disp: 90 Tablet, Rfl: 3    metFORMIN (GLUCOPHAGE) 500 mg tablet, TAKE ONE TABLET BY MOUTH ONE TIME DAILY AT NIGHT, Disp: 90 Tablet, Rfl: 3    vilazodone (VIIBRYD) 20 mg tab tablet, Take 1 Tablet by mouth daily. , Disp: 90 Tablet, Rfl: 3    diclofenac (VOLTAREN) 1 % gel, Apply 4 g to affected area four (4) times daily. Apply to Rt Knee, Disp: 300 g, Rfl: 2    cyanocobalamin 1,000 mcg tablet, Take 1,000 mcg by mouth daily. , Disp: , Rfl:    cholecalciferol (Vitamin D3) (1000 Units /25 mcg) tablet, Take  by mouth daily. , Disp: , Rfl:     Flaxseed Oil oil, by Does Not Apply route. (Patient not taking: Reported on 12/8/2021), Disp: , Rfl:     Date Last Reviewed: 12/15/2021   Number of Personal Factors/Comorbidities that affect the Plan of Care: 3+: HIGH COMPLEXITY   EXAMINATION:   Body Structures Involved:  1. Nerves  2. Eyes and Ears  3. Bones  4. Joints  5. Muscles  6. Ligaments Body Functions Affected:  1. Mental  2. Sensory/Pain  3. Cardio  4. Neuromusculoskeletal  5. Movement Related Activities and Participation Affected:  1. Learning and Applying Knowledge  2. General Tasks and Demands  3. Mobility  4. Self Care  5. Domestic Life  6. Community, Social and [place]   Number of elements (examined above) that affect the Plan of Care: 3: MODERATE COMPLEXITY   CLINICAL PRESENTATION:   Presentation: Evolving clinical presentation with changing clinical characteristics: MODERATE COMPLEXITY   CLINICAL DECISION MAKING:   Use of outcome tool(s) and clinical judgment create a POC that gives a: Questionable prediction of patient's progress: MODERATE COMPLEXITY      OBJECTIVE MEASURES:    Dysmetria:   Nose to finger: WNL   Heel to shin: WNL  Dysdiadochokinsesia:    Rapid alternating supination/pronation: WNL    OCULOMOTOR EXAM:  · Eye Range of Motion: WNL   · Cervical Range of Motion: WNL with symptoms provoked with L cervical rotation  · Spontaneous nystagmus: WNL  · Gaze holding nystagmus: WNL  · Skew Deviation: WNL  · HINTS: WNL  · Smooth Pursuit: WNL  · Voluntary Saccades:  WNL    VESTIBULAR OCULAR REFLEX TESTING:  · Head Thrust Test: Negative to the left & right     POSITION TESTS:  · [place]-Hallpike: Positive to the L side  · Epley maneuver to the L (1st): dizziness reported for ~15 seconds in stage 1; nothing in stage 2; dizziness reported in stage 3 for ~10 seconds after staying in position for 45 seconds; nothing in stages 4-5  · Epley maneuver to the L (2nd): no dizziness reported in stages 1-5         TREATMENT/SESSION ASSESSMENT: Lei Jeremie Santamaria verbalized understanding of role of PT and POC. PT encouraged patient to reduce changing head positions as much as possible, in addition to increasing hydration. Patient advised to make an appointment for next week, but to call the clinic back to cancel if her symptoms have resolved by then. She verbalized understanding. · Pain / Symptoms: Initial: 3/10 headache Post Session: 3/10 headache ·   Response to treatment: Patient responded well to canalith repositioning technique this session. Demonstrated no symptoms with repeated Epley maneuvers to the left side. No evidence of nystagmus with assessment or treatment. · Compliance with Program/Exercises: Will assess as treatment progresses. · Recommendations/Intent for next treatment session: Next visit will focus on advancements to more challenging activities.     Total Treatment Duration:  PT Patient Time In/Time Out  Time In: 0930  Time Out: 1004    Erlin Barr, PT, DPT

## 2021-12-15 NOTE — PROGRESS NOTES
Pablito Santamaria  : 1965  Primary: [address] [facility] Hmo/c*  Secondary:  [address] [place]  [facility] [facility], [place] [address] [address] [facility]  [phone]   [phone]      OUTPATIENT PHYSICAL THERAPY: Daily Treatment Note 12/15/2021  Visit Count: 1    ICD-10: Treatment Diagnosis:   Dizziness & giddiness (R42)  Benign paroxysmal positional vertigo, left ear (H81.12)    Pre-treatment Symptoms/Complaints: See initial evaluation note from today. Pain: Initial: 3/10 headache Post Session: 3/10 headache     Updated Objective Findings: See initial evaluation note from today. Date Last Reviewed: 12/15/2021    TREATMENT:     Treatment/Session Summary:    · Response to Treatment: Patient tolerated assessment well today. · Communication/Consultation: PT emphasized the importance of performing HEP consistently. · Equipment provided: None today. · Recommendations/Intent for next treatment session: Next visit will focus on progressing to more challenging activities. Date:   Date: Date: Date:   Activity / Exercise Parameters      Patient education / assessment HEP review                                                  THERAPEUTIC EXERCISE (0 minutes): Exercises per grid to improve mobility, strength and balance. Required minimal visual, verbal and tactile cues to promote proper body alignment, promote proper body posture and promote proper body mechanics. Progressed resistance, range and repetitions as indicated. MANUAL THERAPY (0 minutes): Joint / soft tissue mobilization and/or manipulation was utilized and necessary because of the patient's restricted joint and soft tissue motion. MODALITIES (0 minutes): Vasopneumatic compression at medium pressure at 36 degrees    THERAPEUTIC ACTIVITY (0 minutes): Therapeutic activities per grid to improve mobility, strength, balance and coordination.  Required minimal visual, verbal and tactile cues to promote static and dynamic balance in stting & standing, promote coordination of bilateral UE & LE, and promote motor control of bilateral UE & LE.    NEUROMUSCULAR RE-EDUCATION (0 minutes): Exercise/activities per grid to improve balance, coordination, posture and proprioception.  Required minimal visual, verbal and tactile cues to promote static and dynamic balance in sitting & standing, promote coordination of bilateral UE & LE, and promote motor control of bilateral UE & LE.    HEP   ---    Total Treatment Billable Duration: 34 minutes  PT Patient Time In/Time Out  Time In: 0930  Time Out: 1004  Reji Rice, PT, DPT    Visit   Approval Visit   # Therapist initials Date Here NS/C  <24 hr NS/C  >24 hr Comments          RECERT DATE: 82/02/1229    1 OMARI 12/15/21 x   Initial evaluation                                                                                                                                                                                Future Appointments   Date Time Provider Greta Mares   12/29/2021  9:30 AM Martha Chris, PT [facility]   6/1/2022  8:45 AM PVF LAB SSA PVF PVD   6/8/2022  8:30 AM Fatemeh Carlisle, SHAHAB ADORNO PVF PVD

## 2022-01-04 ENCOUNTER — TRANSCRIBE ORDER (OUTPATIENT)
Dept: SCHEDULING | Age: 57
End: 2022-01-04

## 2022-01-04 DIAGNOSIS — Z12.31 ENCOUNTER FOR SCREENING MAMMOGRAM FOR MALIGNANT NEOPLASM OF BREAST: Primary | ICD-10-CM

## 2022-01-25 ENCOUNTER — HOSPITAL ENCOUNTER (OUTPATIENT)
Dept: MAMMOGRAPHY | Age: 57
Discharge: HOME | End: 2022-01-25
Attending: PHYSICIAN ASSISTANT
Payer: COMMERCIAL

## 2022-01-25 DIAGNOSIS — Z12.31 ENCOUNTER FOR SCREENING MAMMOGRAM FOR MALIGNANT NEOPLASM OF BREAST: ICD-10-CM

## 2022-01-25 PROCEDURE — 77067 SCR MAMMO BI INCL CAD: CPT

## 2022-03-18 PROBLEM — M25.512 CHRONIC LEFT SHOULDER PAIN: Status: ACTIVE | Noted: 2019-12-03

## 2022-03-18 PROBLEM — G89.29 CHRONIC LEFT SHOULDER PAIN: Status: ACTIVE | Noted: 2019-12-03

## 2022-03-18 PROBLEM — Z98.890 HISTORY OF ENDOMETRIAL ABLATION: Status: ACTIVE | Noted: 2018-12-13

## 2022-03-19 PROBLEM — F45.41 STRESS HEADACHES: Status: ACTIVE | Noted: 2017-12-19

## 2022-03-19 PROBLEM — M19.012 DEGENERATIVE JOINT DISEASE OF LEFT ACROMIOCLAVICULAR JOINT: Status: ACTIVE | Noted: 2019-05-18

## 2022-03-19 PROBLEM — E66.01 CLASS 2 SEVERE OBESITY DUE TO EXCESS CALORIES WITH SERIOUS COMORBIDITY AND BODY MASS INDEX (BMI) OF 38.0 TO 38.9 IN ADULT (HCC): Status: ACTIVE | Noted: 2019-05-08

## 2022-03-19 PROBLEM — J45.909 ASTHMA DUE TO ENVIRONMENTAL ALLERGIES: Status: ACTIVE | Noted: 2020-06-02

## 2022-03-19 PROBLEM — H40.033 ANATOMICAL NARROW ANGLE BORDERLINE GLAUCOMA, BILATERAL: Status: ACTIVE | Noted: 2017-12-21

## 2022-03-19 PROBLEM — M75.52 BURSITIS OF LEFT SHOULDER: Status: ACTIVE | Noted: 2019-05-24

## 2022-03-19 PROBLEM — E66.812 CLASS 2 SEVERE OBESITY DUE TO EXCESS CALORIES WITH SERIOUS COMORBIDITY AND BODY MASS INDEX (BMI) OF 38.0 TO 38.9 IN ADULT: Status: ACTIVE | Noted: 2019-05-08

## 2022-03-19 PROBLEM — M25.542 ARTHRALGIA OF BOTH HANDS: Status: ACTIVE | Noted: 2018-12-13

## 2022-03-19 PROBLEM — K80.20 CALCULUS OF GALLBLADDER WITHOUT CHOLECYSTITIS: Status: ACTIVE | Noted: 2017-06-21

## 2022-03-19 PROBLEM — S43.432A SUPERIOR GLENOID LABRUM LESION OF LEFT SHOULDER: Status: ACTIVE | Noted: 2019-05-18

## 2022-03-19 PROBLEM — M25.541 ARTHRALGIA OF BOTH HANDS: Status: ACTIVE | Noted: 2018-12-13

## 2022-03-31 LAB — COLONOSCOPY, EXTERNAL: NORMAL

## 2022-04-07 ENCOUNTER — HOSPITAL ENCOUNTER (EMERGENCY)
Age: 57
Discharge: HOME | End: 2022-04-07
Attending: EMERGENCY MEDICINE
Payer: COMMERCIAL

## 2022-04-07 ENCOUNTER — APPOINTMENT (OUTPATIENT)
Dept: CT IMAGING | Age: 57
End: 2022-04-07
Attending: EMERGENCY MEDICINE
Payer: COMMERCIAL

## 2022-04-07 VITALS
HEART RATE: 84 BPM | WEIGHT: 205 LBS | SYSTOLIC BLOOD PRESSURE: 115 MMHG | BODY MASS INDEX: 37.73 KG/M2 | OXYGEN SATURATION: 96 % | RESPIRATION RATE: 18 BRPM | TEMPERATURE: 97.8 F | DIASTOLIC BLOOD PRESSURE: 71 MMHG | HEIGHT: 62 IN

## 2022-04-07 DIAGNOSIS — R31.9 HEMATURIA, UNSPECIFIED TYPE: ICD-10-CM

## 2022-04-07 DIAGNOSIS — R10.9 FLANK PAIN: Primary | ICD-10-CM

## 2022-04-07 LAB
ALBUMIN SERPL-MCNC: 3.5 G/DL (ref 3.5–5)
ALBUMIN/GLOB SERPL: 0.8 {RATIO} (ref 1.2–3.5)
ALP SERPL-CCNC: 94 U/L (ref 50–130)
ALT SERPL-CCNC: 17 U/L (ref 12–65)
ANION GAP SERPL CALC-SCNC: 7 MMOL/L (ref 7–16)
AST SERPL-CCNC: 10 U/L (ref 15–37)
BACTERIA URNS QL MICRO: 0 /HPF
BASOPHILS # BLD: 0 K/UL (ref 0–0.2)
BASOPHILS NFR BLD: 0 % (ref 0–2)
BILIRUB SERPL-MCNC: 0.3 MG/DL (ref 0.2–1.1)
BUN SERPL-MCNC: 22 MG/DL (ref 6–23)
CALCIUM SERPL-MCNC: 9.1 MG/DL (ref 8.3–10.4)
CASTS URNS QL MICRO: NORMAL /LPF
CHLORIDE SERPL-SCNC: 105 MMOL/L (ref 98–107)
CO2 SERPL-SCNC: 28 MMOL/L (ref 21–32)
CREAT SERPL-MCNC: 1.09 MG/DL (ref 0.6–1)
DIFFERENTIAL METHOD BLD: ABNORMAL
EOSINOPHIL # BLD: 0.2 K/UL (ref 0–0.8)
EOSINOPHIL NFR BLD: 2 % (ref 0.5–7.8)
EPI CELLS #/AREA URNS HPF: NORMAL /HPF
ERYTHROCYTE [DISTWIDTH] IN BLOOD BY AUTOMATED COUNT: 14.2 % (ref 11.9–14.6)
GLOBULIN SER CALC-MCNC: 4.2 G/DL (ref 2.3–3.5)
GLUCOSE SERPL-MCNC: 129 MG/DL (ref 65–100)
HCT VFR BLD AUTO: 40.3 % (ref 35.8–46.3)
HGB BLD-MCNC: 12.9 G/DL (ref 11.7–15.4)
IMM GRANULOCYTES # BLD AUTO: 0 K/UL (ref 0–0.5)
IMM GRANULOCYTES NFR BLD AUTO: 0 % (ref 0–5)
LIPASE SERPL-CCNC: 144 U/L (ref 73–393)
LYMPHOCYTES # BLD: 1.8 K/UL (ref 0.5–4.6)
LYMPHOCYTES NFR BLD: 20 % (ref 13–44)
MCH RBC QN AUTO: 26.5 PG (ref 26.1–32.9)
MCHC RBC AUTO-ENTMCNC: 32 G/DL (ref 31.4–35)
MCV RBC AUTO: 82.8 FL (ref 79.6–97.8)
MONOCYTES # BLD: 0.8 K/UL (ref 0.1–1.3)
MONOCYTES NFR BLD: 8 % (ref 4–12)
NEUTS SEG # BLD: 6.4 K/UL (ref 1.7–8.2)
NEUTS SEG NFR BLD: 70 % (ref 43–78)
NRBC # BLD: 0 K/UL (ref 0–0.2)
PLATELET # BLD AUTO: 364 K/UL (ref 150–450)
PMV BLD AUTO: 9.2 FL (ref 9.4–12.3)
POTASSIUM SERPL-SCNC: 3.5 MMOL/L (ref 3.5–5.1)
PROT SERPL-MCNC: 7.7 G/DL (ref 6.3–8.2)
RBC # BLD AUTO: 4.87 M/UL (ref 4.05–5.2)
RBC #/AREA URNS HPF: NORMAL /HPF
SODIUM SERPL-SCNC: 140 MMOL/L (ref 136–145)
WBC # BLD AUTO: 9.2 K/UL (ref 4.3–11.1)
WBC URNS QL MICRO: NORMAL /HPF

## 2022-04-07 PROCEDURE — 81003 URINALYSIS AUTO W/O SCOPE: CPT

## 2022-04-07 PROCEDURE — 81015 MICROSCOPIC EXAM OF URINE: CPT

## 2022-04-07 PROCEDURE — 74176 CT ABD & PELVIS W/O CONTRAST: CPT

## 2022-04-07 PROCEDURE — 99284 EMERGENCY DEPT VISIT MOD MDM: CPT

## 2022-04-07 PROCEDURE — 96375 TX/PRO/DX INJ NEW DRUG ADDON: CPT

## 2022-04-07 PROCEDURE — 96374 THER/PROPH/DIAG INJ IV PUSH: CPT

## 2022-04-07 PROCEDURE — 74011250636 HC RX REV CODE- 250/636: Performed by: EMERGENCY MEDICINE

## 2022-04-07 PROCEDURE — 83690 ASSAY OF LIPASE: CPT

## 2022-04-07 PROCEDURE — 80053 COMPREHEN METABOLIC PANEL: CPT

## 2022-04-07 PROCEDURE — 85025 COMPLETE CBC W/AUTO DIFF WBC: CPT

## 2022-04-07 RX ORDER — CIPROFLOXACIN 500 MG/1
500 TABLET ORAL 2 TIMES DAILY
Qty: 14 TABLET | Refills: 0 | Status: SHIPPED | OUTPATIENT
Start: 2022-04-07 | End: 2022-04-14

## 2022-04-07 RX ORDER — MORPHINE SULFATE 4 MG/ML
4 INJECTION INTRAVENOUS
Status: COMPLETED | OUTPATIENT
Start: 2022-04-07 | End: 2022-04-07

## 2022-04-07 RX ORDER — SODIUM CHLORIDE 0.9 % (FLUSH) 0.9 %
5-10 SYRINGE (ML) INJECTION EVERY 8 HOURS
Status: DISCONTINUED | OUTPATIENT
Start: 2022-04-07 | End: 2022-04-07 | Stop reason: HOSPADM

## 2022-04-07 RX ORDER — ONDANSETRON 2 MG/ML
4 INJECTION INTRAMUSCULAR; INTRAVENOUS
Status: COMPLETED | OUTPATIENT
Start: 2022-04-07 | End: 2022-04-07

## 2022-04-07 RX ORDER — SODIUM CHLORIDE 0.9 % (FLUSH) 0.9 %
5-10 SYRINGE (ML) INJECTION AS NEEDED
Status: DISCONTINUED | OUTPATIENT
Start: 2022-04-07 | End: 2022-04-07 | Stop reason: HOSPADM

## 2022-04-07 RX ORDER — TRAMADOL HYDROCHLORIDE 50 MG/1
50 TABLET ORAL
Qty: 11 TABLET | Refills: 0 | Status: SHIPPED | OUTPATIENT
Start: 2022-04-07 | End: 2022-04-10

## 2022-04-07 RX ADMIN — MORPHINE SULFATE 4 MG: 4 INJECTION INTRAVENOUS at 18:27

## 2022-04-07 RX ADMIN — ONDANSETRON 4 MG: 2 INJECTION INTRAMUSCULAR; INTRAVENOUS at 18:27

## 2022-04-07 NOTE — ED PROVIDER NOTES
61-year-old lady presents with concerns about pain in her right flank that she says has been present for several days. She went to an urgent care about some blood in urine and encouraged to come here for evaluation for possible kidney stones. She denies any other symptoms including no nausea, vomiting, or diarrhea. She said no fevers or chills. She says she cannot visibly see blood in her urine. Patient says she does have a remote history of kidney stones but it has been at least 8 or 10 years. She notes she had a colonoscopy a little over a week ago with an EGD. She was told that she did have an early or preulcer. She denies any blood in her bowels. No other associated symptoms. Elements of this note were created using speech recognition software. As such, errors of speech recognition may be present. Past Medical History:   Diagnosis Date    Adverse effect of anesthesia     delayed awakening; pt reports also teary post-op    History of kidney stones        Past Surgical History:   Procedure Laterality Date    FRAGMENT KIDNEY STONE/ ESWL      HX BREAST REDUCTION  1999    HX COLONOSCOPY  2017    HX DILATION AND CURETTAGE  10/2017    endometrial ablation    HX HEENT      GUM SURGURY    HX HEENT  2018    sinus    HX TRABECULECTOMY  2011    Dr. Kaiden aButista         Family History:   Problem Relation Age of Onset    Diabetes Mother         MELITUS    Diabetes Father         MELITUS    Hypertension Father     High Cholesterol Other     Breast Cancer Neg Hx        Social History     Socioeconomic History    Marital status:      Spouse name: Not on file    Number of children: Not on file    Years of education: Not on file    Highest education level: Not on file   Occupational History    Not on file   Tobacco Use    Smoking status: Never Smoker    Smokeless tobacco: Never Used   Substance and Sexual Activity    Alcohol use: Yes     Comment: 1-2 a month    Drug use:  No  Sexual activity: Not on file   Other Topics Concern    Not on file   Social History Narrative    Not on file     Social Determinants of Health     Financial Resource Strain:     Difficulty of Paying Living Expenses: Not on file   Food Insecurity:     Worried About Running Out of Food in the Last Year: Not on file    [place] of Food in the Last Year: Not on file   Transportation Needs:     Lack of Transportation (Medical): Not on file    Lack of Transportation (Non-Medical): Not on file   Physical Activity:     Days of Exercise per Week: Not on file    Minutes of Exercise per Session: Not on file   Stress:     Feeling of Stress : Not on file   Social Connections:     Frequency of Communication with Friends and Family: Not on file    Frequency of Social Gatherings with Friends and Family: Not on file    Attends Orthodox Services: Not on file    Active Member of [address] [place] [address] [employer] or Organizations: Not on file    Attends Club or Organization Meetings: Not on file    Marital Status: Not on file   Intimate Partner Violence:     Fear of Current or Ex-Partner: Not on file    Emotionally Abused: Not on file    Physically Abused: Not on file    Sexually Abused: Not on file   Housing Stability:     Unable to Pay for Housing in the Last Year: Not on file    Number of [place] in the Last Year: Not on file    Unstable Housing in the Last Year: Not on file         ALLERGIES: Amoxicillin    Review of Systems   Constitutional: Negative for chills, diaphoresis and fever. HENT: Negative for congestion, rhinorrhea and sore throat. Eyes: Negative for redness and visual disturbance. Respiratory: Negative for cough, chest tightness, shortness of breath and wheezing. Cardiovascular: Negative for chest pain and palpitations. Gastrointestinal: Negative for abdominal pain, blood in stool, diarrhea, nausea and vomiting. Endocrine: Negative for polydipsia and polyuria. Genitourinary: Positive for flank pain. Negative for dysuria and hematuria. Musculoskeletal: Negative for arthralgias, myalgias and neck stiffness. Skin: Negative for rash. Allergic/Immunologic: Negative for environmental allergies and food allergies. Neurological: Negative for dizziness, weakness and headaches. Hematological: Negative for adenopathy. Does not bruise/bleed easily. Psychiatric/Behavioral: Negative for confusion and sleep disturbance. The patient is not nervous/anxious. Vitals:    04/07/22 1733   BP: (!) 138/90   Pulse: 90   Resp: 18   Temp: 97.8 °F (36.6 °C)   SpO2: 96%   Weight: 93 kg (205 lb)   Height: 5' 2\" (1.575 m)            Physical Exam  Vitals and nursing note reviewed. Constitutional:       General: She is not in acute distress. Appearance: She is well-developed. She is not toxic-appearing. HENT:      Head: Normocephalic and atraumatic. Eyes:      General: No scleral icterus. Right eye: No discharge. Left eye: No discharge. Conjunctiva/sclera: Conjunctivae normal.      Pupils: Pupils are equal, round, and reactive to light. Cardiovascular:      Rate and Rhythm: Normal rate and regular rhythm. Heart sounds: Normal heart sounds. Pulmonary:      Effort: Pulmonary effort is normal. No respiratory distress. Breath sounds: Normal breath sounds. No wheezing or rales. Chest:      Chest wall: No tenderness. Abdominal:      General: Bowel sounds are normal. There is no distension. Palpations: Abdomen is soft. Tenderness: There is no guarding or rebound. Musculoskeletal:         General: No tenderness. Normal range of motion. Cervical back: Normal range of motion. No rigidity. Lymphadenopathy:      Cervical: No cervical adenopathy. Skin:     General: Skin is warm and dry. Neurological:      General: No focal deficit present. Mental Status: She is alert and oriented to person, place, and time.    Psychiatric:         Mood and Affect: Mood normal. Behavior: Behavior normal.          MDM  Number of Diagnoses or Management Options  Flank pain  Hematuria, unspecified type  Diagnosis management comments: Given her recent colonoscopy and EGD I will get a CT scan to evaluate for possible kidney stones, colonoscopy related complication, or perforation. ED Course as of 04/07/22 1913 Thu Apr 07, 2022 1849 Patient CT scan is unrevealing. Does not get any obvious answer for her pain or symptoms. Her blood work has also been unremarkable. [AC]   1911 Patient's urine does have some red cells in it. I do not have an exact explanation for this. I will empirically treat for possible UTI and I will send for a culture.   I will refer her to a urologist for further evaluation. [AC]      ED Course User Index  [AC] Huan Townsend MD       Procedures

## 2022-04-07 NOTE — DISCHARGE INSTRUCTIONS
As we discussed, we did not find the exact cause of your symptoms today in the emergency department. Therefore, it is important for you to follow up with your primary care doctor for reevaluation. I encourage you to consider following up with a urologist for further evaluation of the blood in your urine. Please return with any fevers, blood in your vomit or your bowels, worsening symptoms, or additional concerns.

## 2022-04-07 NOTE — ED TRIAGE NOTES
Pt reports right sided flank pain since Monday. Pt was seen at urgent care and referred here to check for kidney stones. Pt states the pain feels stabbing and has been constant.

## 2022-04-07 NOTE — ED NOTES
I have reviewed discharge instructions with the patient. The patient verbalized understanding. Patient left ED via Discharge Method: ambulatory to Home with spouse, Dominick Maciel. Opportunity for questions and clarification provided. Patient given 2 scripts. To continue your aftercare when you leave the hospital, you may receive an automated call from our care team to check in on how you are doing. This is a free service and part of our promise to provide the best care and service to meet your aftercare needs.  If you have questions, or wish to unsubscribe from this service please call [phone]. Thank you for Choosing our [address] [place] [address] Emergency Department.

## 2022-05-27 DIAGNOSIS — E78.5 DYSLIPIDEMIA: ICD-10-CM

## 2022-05-27 DIAGNOSIS — E11.9 CONTROLLED TYPE 2 DIABETES MELLITUS WITHOUT COMPLICATION, WITHOUT LONG-TERM CURRENT USE OF INSULIN (HCC): Primary | ICD-10-CM

## 2022-06-01 ENCOUNTER — NURSE ONLY (OUTPATIENT)
Dept: FAMILY MEDICINE CLINIC | Facility: CLINIC | Age: 57
End: 2022-06-01

## 2022-06-01 DIAGNOSIS — E11.9 CONTROLLED TYPE 2 DIABETES MELLITUS WITHOUT COMPLICATION, WITHOUT LONG-TERM CURRENT USE OF INSULIN (HCC): ICD-10-CM

## 2022-06-01 DIAGNOSIS — E78.5 DYSLIPIDEMIA: ICD-10-CM

## 2022-06-02 LAB
ALBUMIN SERPL-MCNC: 3.8 G/DL (ref 3.5–5)
ALBUMIN/GLOB SERPL: 1 {RATIO} (ref 1.2–3.5)
ALP SERPL-CCNC: 89 U/L (ref 50–136)
ALT SERPL-CCNC: 24 U/L (ref 12–65)
ANION GAP SERPL CALC-SCNC: 8 MMOL/L (ref 7–16)
AST SERPL-CCNC: 16 U/L (ref 15–37)
BILIRUB SERPL-MCNC: 0.2 MG/DL (ref 0.2–1.1)
BUN SERPL-MCNC: 19 MG/DL (ref 6–23)
CALCIUM SERPL-MCNC: 9.5 MG/DL (ref 8.3–10.4)
CHLORIDE SERPL-SCNC: 103 MMOL/L (ref 98–107)
CHOLEST SERPL-MCNC: 120 MG/DL
CO2 SERPL-SCNC: 27 MMOL/L (ref 21–32)
CREAT SERPL-MCNC: 1 MG/DL (ref 0.6–1)
EST. AVERAGE GLUCOSE BLD GHB EST-MCNC: 143 MG/DL
GLOBULIN SER CALC-MCNC: 3.9 G/DL (ref 2.3–3.5)
GLUCOSE SERPL-MCNC: 105 MG/DL (ref 65–100)
HBA1C MFR BLD: 6.6 % (ref 4.2–6.3)
HDLC SERPL-MCNC: 44 MG/DL (ref 40–60)
HDLC SERPL: 2.7 {RATIO}
LDLC SERPL CALC-MCNC: 54.2 MG/DL
POTASSIUM SERPL-SCNC: 4.2 MMOL/L (ref 3.5–5.1)
PROT SERPL-MCNC: 7.7 G/DL (ref 6.3–8.2)
SODIUM SERPL-SCNC: 138 MMOL/L (ref 136–145)
TRIGL SERPL-MCNC: 109 MG/DL (ref 35–150)
VLDLC SERPL CALC-MCNC: 21.8 MG/DL (ref 6–23)

## 2022-06-07 NOTE — PROGRESS NOTES
Keena Scruggs (: 1965) is a 49 y.o. female  established patient, here for evaluation of the following chief complaint(s):  Chief Complaint   Patient presents with    Hypertension    Diabetes    Results     Lab results          ASSESSMENT/PLAN:  Roosevelt Sun was seen today for hypertension, diabetes and results. Diagnoses and all orders for this visit:    Controlled type 2 diabetes mellitus without complication, without long-term current use of insulin (HCC)  -     lisinopril (PRINIVIL;ZESTRIL) 5 MG tablet; Take 1 tablet by mouth daily TAKE ONE TABLET BY MOUTH ONE TIME DAILY AT NIGHT  -     metFORMIN (GLUCOPHAGE) 500 MG tablet; Take 1 tablet by mouth daily TAKE ONE TABLET BY MOUTH ONE TIME DAILY AT NIGHT  -     Semaglutide, 1 MG/DOSE, (OZEMPIC, 1 MG/DOSE,) 4 MG/3ML SOPN; Inject 1 mL into the skin once a week  -     Hemoglobin A1C; Future  -     Basic Metabolic Panel; Future    Hypothyroidism, adult  -     TSH with Reflex; Future    Dyslipidemia  -     rosuvastatin (CRESTOR) 5 MG tablet; Take 1 tablet by mouth daily    Depression, controlled  -     [facility] [address]  -     vilazodone HCl (VIIBRYD) 20 MG TABS; Take 1 tablet by mouth daily    Anxiety  -     [facility] [address]  -     vilazodone HCl (VIIBRYD) 20 MG TABS; Take 1 tablet by mouth daily    Class 2 severe obesity due to excess calories with serious comorbidity and body mass index (BMI) of 38.0 to 38.9 in [facility])        I reviewed recent lab results w/  Ms. Scruggs. Diabetes is well controlled. Taking: metFORMIN - 500 MG    Lipids are well controlled. The ASCVD Risk score (Troyisac Limon., et al., 2013) failed to calculate for the following reasons: The valid total cholesterol range is 130 to 320 mg/dL  Taking: rosuvastatin - 5 MG    Renal function is WNL. BP is well controlled.  Taking:   Key Anti-Hypertensive Meds          lisinopril (PRINIVIL;ZESTRIL) 5 MG tablet (Taking)    Sig - Route: Take 1 tablet by mouth daily TAKE ONE TABLET BY MOUTH ONE TIME DAILY AT NIGHT - Oral        Having regular hotflashes despite taking [employer] daily. Will have her try a Black Cohosh/Soy combination to see if this may work better. Curretntly taking Vybriid, which may be a little helpful. Will place a referral for her to see psychiatry since She is not sure that [address] is working for her properly. PHQ screening is not concerning for depression. Anxiety stable, but feels that she gets irritated more easily. In Grief Counseling due to loss of her parents. Follow Up  Please have her sign a release for a copy of her last eye exam visit notes. 6 mos DM mgt w/ labs prior to visit       SUBJECTIVE/OBJECTIVE:  At her visit On 12/8/22, the following was discussed:     She is going through a lot of stress recently. Her mother recently passed away due to pancreatic CA. And her father has had 2 heart attacks within the past 6 mos. She is going to be making arrangements to move him from [address] [place] [address] in to a local assisted living, etc.     At today's visit:     Htn Mgt-Does not check BP at home. Denies CP, SOB or dizziness. Headache for about a week. Diabetes Mgt- Patient does not check blood sugar at home.   No vision changes, last eye exam was  Feburary 2022,  no tingling or numbness in feet and no excessive thirst.      Hot flashes more regular          Vitals:    06/08/22 0833   BP: 122/78   Pulse: 78   Resp: 16   Temp: 97.9 °F (36.6 °C)   SpO2: 97%       Diabetic foot exam:   Left Foot:   Visual Exam: normal   Pulse DP: 2+ (normal)   Filament test: 6/6     Right Foot:   Visual Exam: normal   Pulse DP: 2+ (normal)   Filament test: 6/6         PHQ-9  6/8/2022 12/8/2021   Little interest or pleasure in doing things 0 -   Little interest or pleasure in doing things - 2   Feeling down, depressed, or hopeless 1 -   PHQ-2 Score 1 -   Total Score PHQ 2 - 2   PHQ-9 Total Score 1 -                Orders Placed This Encounter    TSH with Reflex     Standing Status:   Future     Standing Expiration Date:   6/8/2023    Hemoglobin A1C     Standing Status:   Future     Standing Expiration Date:   6/8/2023    Basic Metabolic Panel     Standing Status:   Future     Standing Expiration Date:   6/8/2023   [facility] - [address] [facility] [address]     Referral Priority:   Routine     Referral Type:   Eval and Treat     Referral Reason:   Specialty Services Required     Requested Specialty:   Psychiatry     Number of Visits Requested:   1    omeprazole (PRILOSEC) 40 MG delayed release capsule     Sig: Take 40 mg by mouth daily    lisinopril (PRINIVIL;ZESTRIL) 5 MG tablet     Sig: Take 1 tablet by mouth daily TAKE ONE TABLET BY MOUTH ONE TIME DAILY AT NIGHT     Dispense:  90 tablet     Refill:  3    metFORMIN (GLUCOPHAGE) 500 MG tablet     Sig: Take 1 tablet by mouth daily TAKE ONE TABLET BY MOUTH ONE TIME DAILY AT NIGHT     Dispense:  90 tablet     Refill:  3    rosuvastatin (CRESTOR) 5 MG tablet     Sig: Take 1 tablet by mouth daily     Dispense:  90 tablet     Refill:  3    Semaglutide, 1 MG/DOSE, (OZEMPIC, 1 MG/DOSE,) 4 MG/3ML SOPN     Sig: Inject 1 mL into the skin once a week     Dispense:  9 mL     Refill:  3     Please d/c any orders on file for Ozempic 0.5 mg weekly    vilazodone HCl (VIIBRYD) 20 MG TABS     Sig: Take 1 tablet by mouth daily     Dispense:  90 tablet     Refill:  3             An electronic signature was used to authenticate this note.   -- ROSCOE Torres

## 2022-06-08 ENCOUNTER — OFFICE VISIT (OUTPATIENT)
Dept: FAMILY MEDICINE CLINIC | Facility: CLINIC | Age: 57
End: 2022-06-08
Payer: COMMERCIAL

## 2022-06-08 ENCOUNTER — TELEPHONE (OUTPATIENT)
Dept: FAMILY MEDICINE CLINIC | Facility: CLINIC | Age: 57
End: 2022-06-08

## 2022-06-08 VITALS
HEIGHT: 62 IN | HEART RATE: 78 BPM | TEMPERATURE: 97.9 F | BODY MASS INDEX: 38.31 KG/M2 | OXYGEN SATURATION: 97 % | WEIGHT: 208.2 LBS | SYSTOLIC BLOOD PRESSURE: 122 MMHG | RESPIRATION RATE: 16 BRPM | DIASTOLIC BLOOD PRESSURE: 78 MMHG

## 2022-06-08 DIAGNOSIS — E11.9 CONTROLLED TYPE 2 DIABETES MELLITUS WITHOUT COMPLICATION, WITHOUT LONG-TERM CURRENT USE OF INSULIN (HCC): ICD-10-CM

## 2022-06-08 DIAGNOSIS — E66.01 CLASS 2 SEVERE OBESITY DUE TO EXCESS CALORIES WITH SERIOUS COMORBIDITY AND BODY MASS INDEX (BMI) OF 38.0 TO 38.9 IN ADULT (HCC): ICD-10-CM

## 2022-06-08 DIAGNOSIS — F41.9 ANXIETY: ICD-10-CM

## 2022-06-08 DIAGNOSIS — E03.9 HYPOTHYROIDISM, ADULT: ICD-10-CM

## 2022-06-08 DIAGNOSIS — F32.A DEPRESSION, CONTROLLED: ICD-10-CM

## 2022-06-08 DIAGNOSIS — E11.9 CONTROLLED TYPE 2 DIABETES MELLITUS WITHOUT COMPLICATION, WITHOUT LONG-TERM CURRENT USE OF INSULIN (HCC): Primary | ICD-10-CM

## 2022-06-08 DIAGNOSIS — E78.5 DYSLIPIDEMIA: ICD-10-CM

## 2022-06-08 PROCEDURE — G8417 CALC BMI ABV UP PARAM F/U: HCPCS | Performed by: PHYSICIAN ASSISTANT

## 2022-06-08 PROCEDURE — 1036F TOBACCO NON-USER: CPT | Performed by: PHYSICIAN ASSISTANT

## 2022-06-08 PROCEDURE — 3044F HG A1C LEVEL LT 7.0%: CPT | Performed by: PHYSICIAN ASSISTANT

## 2022-06-08 PROCEDURE — 99214 OFFICE O/P EST MOD 30 MIN: CPT | Performed by: PHYSICIAN ASSISTANT

## 2022-06-08 PROCEDURE — 3017F COLORECTAL CA SCREEN DOC REV: CPT | Performed by: PHYSICIAN ASSISTANT

## 2022-06-08 PROCEDURE — 2022F DILAT RTA XM EVC RTNOPTHY: CPT | Performed by: PHYSICIAN ASSISTANT

## 2022-06-08 PROCEDURE — G8427 DOCREV CUR MEDS BY ELIG CLIN: HCPCS | Performed by: PHYSICIAN ASSISTANT

## 2022-06-08 RX ORDER — SEMAGLUTIDE 1.34 MG/ML
1 INJECTION, SOLUTION SUBCUTANEOUS WEEKLY
Qty: 9 ML | Refills: 3 | Status: SHIPPED | OUTPATIENT
Start: 2022-06-08

## 2022-06-08 RX ORDER — LISINOPRIL 5 MG/1
5 TABLET ORAL DAILY
Qty: 90 TABLET | Refills: 3 | Status: SHIPPED | OUTPATIENT
Start: 2022-06-08

## 2022-06-08 RX ORDER — SEMAGLUTIDE 1.34 MG/ML
1 INJECTION, SOLUTION SUBCUTANEOUS WEEKLY
Qty: 9 ML | Refills: 3 | Status: SHIPPED | OUTPATIENT
Start: 2022-06-08 | End: 2022-06-08 | Stop reason: SDUPTHER

## 2022-06-08 RX ORDER — OMEPRAZOLE 40 MG/1
40 CAPSULE, DELAYED RELEASE ORAL DAILY
COMMUNITY
Start: 2022-03-31 | End: 2022-06-29

## 2022-06-08 RX ORDER — VILAZODONE HYDROCHLORIDE 20 MG/1
20 TABLET ORAL DAILY
Qty: 90 TABLET | Refills: 3 | Status: SHIPPED | OUTPATIENT
Start: 2022-06-08 | End: 2022-09-27 | Stop reason: SDUPTHER

## 2022-06-08 RX ORDER — ROSUVASTATIN CALCIUM 5 MG/1
5 TABLET, COATED ORAL DAILY
Qty: 90 TABLET | Refills: 3 | Status: SHIPPED | OUTPATIENT
Start: 2022-06-08

## 2022-06-08 ASSESSMENT — ANXIETY QUESTIONNAIRES
GAD7 TOTAL SCORE: 2
7. FEELING AFRAID AS IF SOMETHING AWFUL MIGHT HAPPEN: 0
4. TROUBLE RELAXING: 0
1. FEELING NERVOUS, ANXIOUS, OR ON EDGE: 0
IF YOU CHECKED OFF ANY PROBLEMS ON THIS QUESTIONNAIRE, HOW DIFFICULT HAVE THESE PROBLEMS MADE IT FOR YOU TO DO YOUR WORK, TAKE CARE OF THINGS AT HOME, OR GET ALONG WITH OTHER PEOPLE: SOMEWHAT DIFFICULT
6. BECOMING EASILY ANNOYED OR IRRITABLE: 0
3. WORRYING TOO MUCH ABOUT DIFFERENT THINGS: 1
5. BEING SO RESTLESS THAT IT IS HARD TO SIT STILL: 0
2. NOT BEING ABLE TO STOP OR CONTROL WORRYING: 1

## 2022-06-08 ASSESSMENT — PATIENT HEALTH QUESTIONNAIRE - PHQ9
SUM OF ALL RESPONSES TO PHQ QUESTIONS 1-9: 1
SUM OF ALL RESPONSES TO PHQ QUESTIONS 1-9: 1
2. FEELING DOWN, DEPRESSED OR HOPELESS: 1
SUM OF ALL RESPONSES TO PHQ QUESTIONS 1-9: 1
SUM OF ALL RESPONSES TO PHQ9 QUESTIONS 1 & 2: 1
1. LITTLE INTEREST OR PLEASURE IN DOING THINGS: 0
SUM OF ALL RESPONSES TO PHQ QUESTIONS 1-9: 1

## 2022-06-08 NOTE — TELEPHONE ENCOUNTER
[facility] called requesting clarification for patient's Ozempic dose.  Rx states 1 ml should it be mg

## 2022-06-24 ENCOUNTER — PATIENT MESSAGE (OUTPATIENT)
Dept: FAMILY MEDICINE CLINIC | Facility: CLINIC | Age: 57
End: 2022-06-24

## 2022-06-24 NOTE — TELEPHONE ENCOUNTER
From: Darlin Scruggs  To: Comfort Alvarez  Sent: 6/24/2022 12:03 PM EDT  Subject: Referral     Hi. I saw Corina Castellanos two weeks ago and she put in a referral for a psychiatrist. She said if I didnt hear from them in two weeks to let you know. I havent heard from them yet.  Can you please follow up on that referral?  Thank you   [facility]

## 2022-09-27 ENCOUNTER — OFFICE VISIT (OUTPATIENT)
Dept: BEHAVIORAL/MENTAL HEALTH CLINIC | Age: 57
End: 2022-09-27
Payer: COMMERCIAL

## 2022-09-27 VITALS
DIASTOLIC BLOOD PRESSURE: 80 MMHG | HEART RATE: 81 BPM | HEIGHT: 62 IN | BODY MASS INDEX: 38.64 KG/M2 | OXYGEN SATURATION: 99 % | SYSTOLIC BLOOD PRESSURE: 126 MMHG | WEIGHT: 210 LBS

## 2022-09-27 DIAGNOSIS — F33.0 MDD (MAJOR DEPRESSIVE DISORDER), RECURRENT EPISODE, MILD (HCC): Primary | ICD-10-CM

## 2022-09-27 PROCEDURE — 99417 PROLNG OP E/M EACH 15 MIN: CPT | Performed by: STUDENT IN AN ORGANIZED HEALTH CARE EDUCATION/TRAINING PROGRAM

## 2022-09-27 PROCEDURE — 99205 OFFICE O/P NEW HI 60 MIN: CPT | Performed by: STUDENT IN AN ORGANIZED HEALTH CARE EDUCATION/TRAINING PROGRAM

## 2022-09-27 RX ORDER — VILAZODONE HYDROCHLORIDE 40 MG/1
40 TABLET ORAL DAILY
Qty: 30 TABLET | Refills: 2 | Status: SHIPPED | OUTPATIENT
Start: 2022-09-27 | End: 2022-11-01

## 2022-09-27 ASSESSMENT — PATIENT HEALTH QUESTIONNAIRE - PHQ9
SUM OF ALL RESPONSES TO PHQ9 QUESTIONS 1 & 2: 1
3. TROUBLE FALLING OR STAYING ASLEEP: 3
7. TROUBLE CONCENTRATING ON THINGS, SUCH AS READING THE NEWSPAPER OR WATCHING TELEVISION: 0
SUM OF ALL RESPONSES TO PHQ QUESTIONS 1-9: 8
8. MOVING OR SPEAKING SO SLOWLY THAT OTHER PEOPLE COULD HAVE NOTICED. OR THE OPPOSITE, BEING SO FIGETY OR RESTLESS THAT YOU HAVE BEEN MOVING AROUND A LOT MORE THAN USUAL: 0
6. FEELING BAD ABOUT YOURSELF - OR THAT YOU ARE A FAILURE OR HAVE LET YOURSELF OR YOUR FAMILY DOWN: 1
5. POOR APPETITE OR OVEREATING: 3
SUM OF ALL RESPONSES TO PHQ QUESTIONS 1-9: 8
2. FEELING DOWN, DEPRESSED OR HOPELESS: 1
9. THOUGHTS THAT YOU WOULD BE BETTER OFF DEAD, OR OF HURTING YOURSELF: 0
10. IF YOU CHECKED OFF ANY PROBLEMS, HOW DIFFICULT HAVE THESE PROBLEMS MADE IT FOR YOU TO DO YOUR WORK, TAKE CARE OF THINGS AT HOME, OR GET ALONG WITH OTHER PEOPLE: 1
1. LITTLE INTEREST OR PLEASURE IN DOING THINGS: 0
SUM OF ALL RESPONSES TO PHQ QUESTIONS 1-9: 8
SUM OF ALL RESPONSES TO PHQ QUESTIONS 1-9: 8

## 2022-09-27 ASSESSMENT — ANXIETY QUESTIONNAIRES
1. FEELING NERVOUS, ANXIOUS, OR ON EDGE: 0
3. WORRYING TOO MUCH ABOUT DIFFERENT THINGS: 2
6. BECOMING EASILY ANNOYED OR IRRITABLE: 0
4. TROUBLE RELAXING: 0
7. FEELING AFRAID AS IF SOMETHING AWFUL MIGHT HAPPEN: 0
5. BEING SO RESTLESS THAT IT IS HARD TO SIT STILL: 0
GAD7 TOTAL SCORE: 2
IF YOU CHECKED OFF ANY PROBLEMS ON THIS QUESTIONNAIRE, HOW DIFFICULT HAVE THESE PROBLEMS MADE IT FOR YOU TO DO YOUR WORK, TAKE CARE OF THINGS AT HOME, OR GET ALONG WITH OTHER PEOPLE: SOMEWHAT DIFFICULT
2. NOT BEING ABLE TO STOP OR CONTROL WORRYING: 0

## 2022-09-27 NOTE — PROGRESS NOTES
[place]       [facility]    Patient : 1965    Patient MRN: [id number]    Insurance: Claudia    Primary Language: English      Date of Service: 2022    Type of Service: Medication management    Other Services Involved: N/A       Phone Number: [phone]   Emergency Contact: Anju Thayer, , [phone]       Chief Complaint: \"I'm going to cry\"         History of Present Illness    Carley Castelan is a 56 y.o. female with reported prior psychiatric history significant for recurrent depression who presents for initial evaluation. Pt reports that they are currently prescribed: Viibryd 20 mg daily. Pt reports that she has experienced significant psychosocial stressors over the past several years, including her mother being diagnosed with pancreatic cancer in , being unable to work due to her shoulder, her father having multiple HA. After her mother passed away in Aug '21, her father continued to decline until he passed away on . Pt presented as tearful when discussing the above and shares that she is currently the executor of their estate, which has complicated her relationships with her siblings. Often struggles to take care of herself because she is \"always making sure everyone else is ok, then I fall apart. \" Has been working with a grief counselor monthly since last year, which has been helpful. However, does not believe that she has been able to fully grieve. Recently started a new job. Psychiatric Review of Systems    Depression: depressed mood, anhedonia, low energy, insomnia, and feelings of worthlessness or excessive guilt. Expresses that she \"came out of the womb depressed,\" and reports that it was never addressed by her parents. Does not believe that she was able to fully address it herself until she moved to [place] about 17 yrs ago.  Believes that she would be able to get into interests again \"if I could get started. \" Rates her depression as a 7/10. Denies SI/HI. Anxiety: excessive anxiety and worry. Denies difficulty controlling anxiety or significant impairment. One prior severe PA, but denies recurrence. Hypomania/monse: denies    Psychosis: denies    Trauma: denies         Psychiatric History    Inpatient psychiatric hospitalizations: denies    Previous outpatient psychiatric treatment: reports first being prescribed medication for depression through employee health for her job about 12 yrs ago; has been off and on several medications through the years    Prior therapy: currently engaged in once monthly for grief counseling    Prior psychiatric medication trials: possibly Celexa or Cymbalta, Lexapro, Wellbutrin    Current psychiatric medication: Viibryd 20 mg    History of suicide attempts: denies prior SA    Self injurious behaviors: denies    History of trauma, violence or abuse: denies prior physical, emotional/verbal, or sexual abuse         Family History    Mental illness in family: mother - depression; father - depression    Substance abuse in family: denies    Completed suicide in family: denies         Social History    From [place], raised by mother and father; describes childhood as \"I don't remember a lot of my childhood,\" but states that her siblings and cousins told her that \"we had a wonderful childhood;\" father was frequently working and her mother was at home with four kids; feels that her mother \"wanted us to treat her like a heather\"    :  x7 yrs    Children: no children    Living Situation: with     Level of Education: associates degree.     Occupation: works as  for an [address] [employer]    Spiritual/Mandaeism affiliation: denies     History:  denies    Legal History: denies    Guns in home: yes; one loaded, others unloaded        Substance Abuse History    Tobacco: denies    Alcohol: reports occasional use, roughly 1-2x/mo; denies significant prior use, though did drink more during the summer of '20    Cannabis: denies    Stimulants: denies    Opioids: previously only as prescribed    Benzodiazepines: denies    Hallucinogens: denies    Other: denies    The patient denies the use of any other substance of abuse. History of drug rehabilitation or detoxification: denies         Past Medical History:    Past Medical History:   Diagnosis Date    Adverse effect of anesthesia     delayed awakening; pt reports also teary post-op    Anxiety     Depression     Headache     History of kidney stones     Thyroid disease        Allergies:    Amoxicillin         Current Home Medications:    Current Outpatient Medications   Medication Instructions    albuterol sulfate  (90 Base) MCG/ACT inhaler 1 puff, Inhalation, EVERY 6 HOURS PRN    cyanocobalamin 1,000 mcg, Oral, DAILY    Flaxseed Oil OIL Other    levothyroxine (SYNTHROID) 50 mcg, Oral, DAILY BEFORE BREAKFAST    lisinopril (PRINIVIL;ZESTRIL) 5 mg, Oral, DAILY, TAKE ONE TABLET BY MOUTH ONE TIME DAILY AT NIGHT    loratadine (CLARITIN) 10 mg    metFORMIN (GLUCOPHAGE) 500 mg, Oral, DAILY, TAKE ONE TABLET BY MOUTH ONE TIME DAILY AT NIGHT    omeprazole (PRILOSEC) 40 mg, Oral, DAILY    Ozempic (1 MG/DOSE) 1 mg, SubCUTAneous, WEEKLY    rosuvastatin (CRESTOR) 5 mg, Oral, DAILY    vilazodone HCl (VIIBRYD) 40 mg, Oral, DAILY    vitamin D (CHOLECALCIFEROL) 25 MCG (1000 UT) TABS tablet Oral, DAILY    vitamin E 1,000 Units, Oral, DAILY         PDMP:  Last reviewed: 9/27/22 04/07/2022 04/07/2022   1  Tramadol Hcl 50 Mg Tablet 11.00  4  Aa Col             Review of systems    Constitutional: denies significant weight loss/gain, fatigue    HEENT: +seasonal allergies; denies rhinorrhea, sore throat. Respiratory: denies cough, shortness of breath    Cardiovascular: denies chest pain    GI: denies N/V/C/D, abdominal pain. MSK: denies joint pain, muscle stiffness/soreness, back pain, neck pain.     Neuro: denies HA, dizziness. Psychiatric: as above and below. Vital Signs:     Temp Readings from Last 3 Encounters:   06/08/22 97.9 °F (36.6 °C) (Oral)       BP Readings from Last 3 Encounters:   09/27/22 126/80   06/08/22 122/78   12/08/21 112/70       Pulse Readings from Last 3 Encounters:   09/27/22 81   06/08/22 78   12/08/21 65          Lab Results:     Lab Results   Component Value Date/Time    WBC 9.2 04/07/2022 05:48 PM    HGB 12.9 04/07/2022 05:48 PM    HCT 40.3 04/07/2022 05:48 PM    MCV 82.8 04/07/2022 05:48 PM    MCH 26.5 04/07/2022 05:48 PM    MCHC 32.0 04/07/2022 05:48 PM    RDW 14.2 04/07/2022 05:48 PM    MPV 9.2 04/07/2022 05:48 PM    MONOPCT 8 04/07/2022 05:48 PM    BASOPCT 0 04/07/2022 05:48 PM    DIFFTYPE AUTOMATED 04/07/2022 05:48 PM         Lab Results   Component Value Date    TRIG 109 06/01/2022    HDL 44 06/01/2022    CHOL 120 06/01/2022    GLUCOSE 105 (H) 06/01/2022         Next Labs Due:  7 mo       All pertinent/available labs reviewed. Mental Status Examination    General/Appearance: Appears stated age, Well-kept, and Appropriately attired    Behavior: no abnormalities noted    Eye Contact: good    Psychomotor:  Within normal limits    Musculoskeletal: gait wnl    Speech/Language: Within normal limits    Mood: \"down\"    Affect: Appropriate, Congruent, and Full-range; intermittently tearful    Thought process: linear, goal directed, and coherent    Thought content: No suicidal ideation and No homicidal ideation    Perceptions: No perceptual disturbance    Orientation: oriented to person, place, and time    Memory: Intact long-term and Intact short-term    Attention/Concentration: Sustained    Fund of knowledge: fair    Judgement: good    Insight: fair         Questionnaire Findings:    PHQ9: 8 (9/27/22)    GAD7: 2 (9/27/22)         Assessment/Summary of Findings:    Carley Castelan is a 56 y.o. female with reported prior psychiatric history significant for recurrent depression who presents for initial evaluation. Pt reports that they are currently prescribed: Viibryd 20 mg daily. She reports a long-standing history of recurrent depression since childhood that has been exacerbated by multiple psychosocial stressors over the past few years, primarily the passing of her parents and stress associated with being the executor of their estate. Has been prescribed Viibryd 20 mg for the past year and believes that it has \"taken the edge off,\" but continues to experience mild-mod symptoms of depression. Additionally, she is currently engaged with grief counselor, but is only able to see her roughly once/mo. Denies significant symptoms of anxiety, SI/HI, A/VH, paranoia or delusions. Discussed treatment planning and pt is open to further titration of Viibryd in addition to attempting to increase frequency of counseling, as she likely has been unable to properly grieve or heal from recent losses. Pt expressed understanding and agreement. Diagnosis:    MDD, recurrent, mild    Plan:    Charlene Kohler will receive medication management at [address] [place] [address],[address]. Medication Recommendations:    - Increase Viibryd to 40 mg daily    - Medication side effect profiles, risks, and benefits were discussed with the patient. - Patient encouraged to contact the clinic if experiencing any adverse reactions with medications. Other Recommendations:    - Increase frequency of counseling; if unable to do so with current counselor, will provide referral    - If patient has any concerns for their own safety due to adverse reactions to medications, suicidal or homicidal ideations, auditory or visual hallucinations, or delusions, they have been told to call 911 or go to the nearest emergency department.       RTC: 1 mo      96 minutes were spent on the day of the encounter for preparation/chart review, evaluation, psychoeducation, medication management, supportive counseling, documentation, and all associated orders/referrals/communications for the above E/M service      Tatianna Fan MD    9/27/2022 10:27 AM    [address]

## 2022-10-20 ENCOUNTER — OFFICE VISIT (OUTPATIENT)
Dept: ORTHOPEDIC SURGERY | Age: 57
End: 2022-10-20
Payer: COMMERCIAL

## 2022-10-20 DIAGNOSIS — M79.671 RIGHT FOOT PAIN: Primary | ICD-10-CM

## 2022-10-20 DIAGNOSIS — M79.89 PALPABLE MASS OF SOFT TISSUE OF FOOT: ICD-10-CM

## 2022-10-20 PROCEDURE — 1036F TOBACCO NON-USER: CPT | Performed by: ORTHOPAEDIC SURGERY

## 2022-10-20 PROCEDURE — G8427 DOCREV CUR MEDS BY ELIG CLIN: HCPCS | Performed by: ORTHOPAEDIC SURGERY

## 2022-10-20 PROCEDURE — 3017F COLORECTAL CA SCREEN DOC REV: CPT | Performed by: ORTHOPAEDIC SURGERY

## 2022-10-20 PROCEDURE — G8484 FLU IMMUNIZE NO ADMIN: HCPCS | Performed by: ORTHOPAEDIC SURGERY

## 2022-10-20 PROCEDURE — 20612 ASPIRATE/INJ GANGLION CYST: CPT | Performed by: ORTHOPAEDIC SURGERY

## 2022-10-20 PROCEDURE — G8417 CALC BMI ABV UP PARAM F/U: HCPCS | Performed by: ORTHOPAEDIC SURGERY

## 2022-10-20 PROCEDURE — 99204 OFFICE O/P NEW MOD 45 MIN: CPT | Performed by: ORTHOPAEDIC SURGERY

## 2022-10-20 NOTE — PROGRESS NOTES
Name: [facility]  YOB: 1965  Gender: female  MRN: [id number]     CC: Right foot    HPI:   Summer 2022, she noticed a nodule in the dorsal lateral left foot. She reports some increase in size but more noticeable pain with enclosed shoes    ROS/Meds/PSH/PMH/FH/SH: reviewed today    Tobacco:  reports that she has never smoked. She has never used smokeless tobacco.     Physical Examination:  Patient appears to be alert and oriented with acceptable appearance. No obvious distress or SOB  CV: appears to have acceptable vascular color and capillary refill  Neuro: appears to have mostly intact light touch sensation   Skin: Right dorsal lateral fourth TMT area 1 cm soft tissue mass; slightly movable; no skin dimpling or adherence  MS: Standing: Plantigrade: Mild bunion: Gait full:  Right = dorsal lateral fourth TMT area soft tissue mass;   Right = good ankle/foot motion 5/5 strength    XR: Right foot: Standing AP lateral oblique foot taken today with no soft tissue calcification; no collapse arthritis; bipartite tibial sesamoid; fourth tarsometatarsal spurring  XR Impression:  As above      Procedure: We discussed the risks and complications of the procedure and the decision was made to proceed; after sterile prep, the dorsal lateral foot lesion was aspirated revealing ~ 1/8 teaspoon of gelatinous clear fluid. She tolerated the procedure well with no known complications or concerns     Assessment:    Right dorsal lateral foot soft tissue mass - ganglion versus synovial cyst suspected fourth TMT source    Plan:   The patient and I discussed the above assessment. We explored treatment options. 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   Fluid had typical color and thickness consistent with ganglion cyst    We discussed with any recurrence, future treatment regarding possible MRI scan but more likely surgical resection  Advanced medical imaging: We discussed MRI scan will hold since this appears completely cystic  DME: No indication  Medication - OTC meds prn:   Surgical discussion: We discussed surgical resection consisting of outpatient soft tissue mass resection. I did go over anticipated surgery with risk and complications and expected postop course. She understands my thumb debilities and if there is any recurrence she will be seen by one of my surgical partners. Follow up: As needed  Work status: Regular    This note was created using Dragon voice recognition software which may result in errors of speech and spelling recognition and word/phrase syntax errors.

## 2022-11-01 ENCOUNTER — OFFICE VISIT (OUTPATIENT)
Dept: BEHAVIORAL/MENTAL HEALTH CLINIC | Age: 57
End: 2022-11-01
Payer: COMMERCIAL

## 2022-11-01 VITALS
BODY MASS INDEX: 38.64 KG/M2 | DIASTOLIC BLOOD PRESSURE: 68 MMHG | HEART RATE: 77 BPM | WEIGHT: 210 LBS | OXYGEN SATURATION: 97 % | HEIGHT: 62 IN | SYSTOLIC BLOOD PRESSURE: 114 MMHG

## 2022-11-01 DIAGNOSIS — F33.0 MDD (MAJOR DEPRESSIVE DISORDER), RECURRENT EPISODE, MILD (HCC): Primary | ICD-10-CM

## 2022-11-01 PROCEDURE — G8484 FLU IMMUNIZE NO ADMIN: HCPCS | Performed by: STUDENT IN AN ORGANIZED HEALTH CARE EDUCATION/TRAINING PROGRAM

## 2022-11-01 PROCEDURE — 3017F COLORECTAL CA SCREEN DOC REV: CPT | Performed by: STUDENT IN AN ORGANIZED HEALTH CARE EDUCATION/TRAINING PROGRAM

## 2022-11-01 PROCEDURE — G8427 DOCREV CUR MEDS BY ELIG CLIN: HCPCS | Performed by: STUDENT IN AN ORGANIZED HEALTH CARE EDUCATION/TRAINING PROGRAM

## 2022-11-01 PROCEDURE — G8417 CALC BMI ABV UP PARAM F/U: HCPCS | Performed by: STUDENT IN AN ORGANIZED HEALTH CARE EDUCATION/TRAINING PROGRAM

## 2022-11-01 PROCEDURE — 1036F TOBACCO NON-USER: CPT | Performed by: STUDENT IN AN ORGANIZED HEALTH CARE EDUCATION/TRAINING PROGRAM

## 2022-11-01 PROCEDURE — 99214 OFFICE O/P EST MOD 30 MIN: CPT | Performed by: STUDENT IN AN ORGANIZED HEALTH CARE EDUCATION/TRAINING PROGRAM

## 2022-11-01 RX ORDER — VILAZODONE HYDROCHLORIDE 40 MG/1
40 TABLET ORAL DAILY
Qty: 90 TABLET | Refills: 0 | Status: SHIPPED | OUTPATIENT
Start: 2022-11-01 | End: 2023-01-30

## 2022-11-01 ASSESSMENT — PATIENT HEALTH QUESTIONNAIRE - PHQ9
9. THOUGHTS THAT YOU WOULD BE BETTER OFF DEAD, OR OF HURTING YOURSELF: 0
2. FEELING DOWN, DEPRESSED OR HOPELESS: 0
3. TROUBLE FALLING OR STAYING ASLEEP: 3
1. LITTLE INTEREST OR PLEASURE IN DOING THINGS: 0
6. FEELING BAD ABOUT YOURSELF - OR THAT YOU ARE A FAILURE OR HAVE LET YOURSELF OR YOUR FAMILY DOWN: 0
SUM OF ALL RESPONSES TO PHQ QUESTIONS 1-9: 9
10. IF YOU CHECKED OFF ANY PROBLEMS, HOW DIFFICULT HAVE THESE PROBLEMS MADE IT FOR YOU TO DO YOUR WORK, TAKE CARE OF THINGS AT HOME, OR GET ALONG WITH OTHER PEOPLE: 1
5. POOR APPETITE OR OVEREATING: 3
SUM OF ALL RESPONSES TO PHQ9 QUESTIONS 1 & 2: 0
7. TROUBLE CONCENTRATING ON THINGS, SUCH AS READING THE NEWSPAPER OR WATCHING TELEVISION: 0
8. MOVING OR SPEAKING SO SLOWLY THAT OTHER PEOPLE COULD HAVE NOTICED. OR THE OPPOSITE, BEING SO FIGETY OR RESTLESS THAT YOU HAVE BEEN MOVING AROUND A LOT MORE THAN USUAL: 0
SUM OF ALL RESPONSES TO PHQ QUESTIONS 1-9: 9
4. FEELING TIRED OR HAVING LITTLE ENERGY: 3
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
## 2022-11-22 ENCOUNTER — NURSE ONLY (OUTPATIENT)
Dept: FAMILY MEDICINE CLINIC | Facility: CLINIC | Age: 57
End: 2022-11-22

## 2022-11-22 DIAGNOSIS — E03.9 HYPOTHYROIDISM, ADULT: ICD-10-CM

## 2022-11-22 DIAGNOSIS — E11.9 CONTROLLED TYPE 2 DIABETES MELLITUS WITHOUT COMPLICATION, WITHOUT LONG-TERM CURRENT USE OF INSULIN (HCC): ICD-10-CM

## 2022-11-22 LAB
ANION GAP SERPL CALC-SCNC: 7 MMOL/L (ref 2–11)
BUN SERPL-MCNC: 20 MG/DL (ref 6–23)
CALCIUM SERPL-MCNC: 9.9 MG/DL (ref 8.3–10.4)
CHLORIDE SERPL-SCNC: 106 MMOL/L (ref 101–110)
CO2 SERPL-SCNC: 27 MMOL/L (ref 21–32)
CREAT SERPL-MCNC: 1 MG/DL (ref 0.6–1)
EST. AVERAGE GLUCOSE BLD GHB EST-MCNC: 146 MG/DL
GLUCOSE SERPL-MCNC: 131 MG/DL (ref 65–100)
HBA1C MFR BLD: 6.7 % (ref 4.8–5.6)
POTASSIUM SERPL-SCNC: 4.2 MMOL/L (ref 3.5–5.1)
SODIUM SERPL-SCNC: 140 MMOL/L (ref 133–143)
TSH W FREE THYROID IF ABNORMAL: 0.7 UIU/ML (ref 0.36–3.74)

## 2022-11-28 PROBLEM — K21.9 GASTROESOPHAGEAL REFLUX DISEASE WITHOUT ESOPHAGITIS: Status: ACTIVE | Noted: 2022-02-25

## 2022-11-28 NOTE — PROGRESS NOTES
Anais Scruggs (: 1965) is a 56 y.o. female, an established patient, is here for evaluation of the following chief complaint(s):  Chief Complaint   Patient presents with    Diabetes    Results    Hyperlipidemia    Anxiety        ASSESSMENT/PLAN:  Alissa Alegre was seen today for diabetes, results, hyperlipidemia and anxiety. Diagnoses and all orders for this visit:    Controlled type 2 diabetes mellitus without complication, without long-term current use of insulin (HCC)  -     Hemoglobin A1C; Future  -     Comprehensive Metabolic Panel; Future  -     Lipid Panel; Future  -     Microalbumin / Creatinine Urine Ratio; Future    Hypothyroidism, adult  -     levothyroxine (SYNTHROID) 50 MCG tablet; Take 1 tablet by mouth every morning (before breakfast)    Dyslipidemia  -     Comprehensive Metabolic Panel; Future  -     Lipid Panel; Future    Anxiety    Depression, controlled    Need for pneumococcal vaccine  -     Pneumococcal, PCV20, PREVNAR 20, (age 25 yrs+), IM, PF  -     GA IMMUNIZ ADMIN,1 SINGLE/COMB VAC/TOXOID    Gastroesophageal reflux disease, unspecified whether esophagitis present  -     famotidine (PEPCID) 20 MG tablet; Take 1 tablet by mouth daily as needed (acid reflux)    I reviewed recent lab results w/  Ms. Scruggs. TSH is treated to goal.  Continue levothyroxine 50 mcg daily. Diabetes is well controlled. Currently prescribed:   Key Antihyperglycemic Medications            metFORMIN (GLUCOPHAGE) 500 MG tablet    Sig - Route: Take 1 tablet by mouth daily TAKE ONE TABLET BY MOUTH ONE TIME DAILY AT NIGHT - Oral    Semaglutide, 1 MG/DOSE, (OZEMPIC, 1 MG/DOSE,) 4 MG/3ML SOPN    Sig - Route: Inject 1 mg into the skin once a week - SubCUTAneous    Notes to Pharmacy: Please cancel prior rx sent over w/ instructions to administer 1 ml    Prior authorization: Closed - Prior Authorization not required for patient/medication     BP is well controlled.   Currently prescribed:   Key Anti-Hypertensive Meds            lisinopril (PRINIVIL;ZESTRIL) 5 MG tablet    Sig - Route: Take 1 tablet by mouth daily TAKE ONE TABLET BY MOUTH ONE TIME DAILY AT NIGHT - Oral   Taking for renal protection? Hot Flashes better controlled since last visit. Taking a Black Cohosh/Soy combo now. Rev'd visit note dated 11/1/22 w/ [facility] [address]. Currently tx'd for Depression and prescribed: Viibryd 40mg daily. --a counselor appt is scheduled for March. 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 Fluid had typical color and thickness consistent with ganglion cyst\"    Now working for a [employer] as a .        Follow Up    Please have her sign a release for prior medical records from the [facility]--need copy of last visit note. 6 mos DM mgt w/ labs prior to visit    SUBJECTIVE/OBJECTIVE:  At her visit On 6/8/22, the following was discussed:     Having regular hotflashes despite taking [employer] daily. Will have her try a Black Cohosh/Soy combination to see if this may work better. Curretntly taking Vybriid, which may be a little helpful. Referred to psychiatry. At today's visit:     Patient does not check blood glucose at home. Denies having any new problems. No vision problems or tingling and numbness in feet.                Vitals:    11/29/22 1047   BP: 102/68   Pulse: 93   Resp: 16   Temp: 98 °F (36.7 °C)   TempSrc: Oral   SpO2: 97%   Weight: 205 lb (93 kg)   Height: 5' 2\" (1.575 m)          Orders Placed This Encounter    Pneumococcal, PCV20, PREVNAR 20, (age 25 yrs+), IM, PF    Hemoglobin A1C     Standing Status:   Future     Standing Expiration Date:   11/29/2023    Comprehensive Metabolic Panel     Standing Status:   Future     Standing Expiration Date: 11/29/2023    Lipid Panel     Standing Status:   Future     Standing Expiration Date:   11/29/2023    Microalbumin / Creatinine Urine Ratio     Standing Status:   Future     Standing Expiration Date:   11/29/2023    MD IMMUNIZ ADMIN,1 SINGLE/COMB VAC/TOXOID    famotidine (PEPCID) 20 MG tablet     Sig: Take 1 tablet by mouth daily as needed (acid reflux)     Dispense:  90 tablet     Refill:  3    levothyroxine (SYNTHROID) 50 MCG tablet     Sig: Take 1 tablet by mouth every morning (before breakfast)     Dispense:  90 tablet     Refill:  3               An electronic signature was used to authenticate this note.   -- ROSCOE Wills

## 2022-11-29 ENCOUNTER — OFFICE VISIT (OUTPATIENT)
Dept: FAMILY MEDICINE CLINIC | Facility: CLINIC | Age: 57
End: 2022-11-29
Payer: COMMERCIAL

## 2022-11-29 VITALS
DIASTOLIC BLOOD PRESSURE: 68 MMHG | HEIGHT: 62 IN | SYSTOLIC BLOOD PRESSURE: 102 MMHG | WEIGHT: 205 LBS | BODY MASS INDEX: 37.73 KG/M2 | HEART RATE: 93 BPM | TEMPERATURE: 98 F | OXYGEN SATURATION: 97 % | RESPIRATION RATE: 16 BRPM

## 2022-11-29 DIAGNOSIS — E78.5 DYSLIPIDEMIA: ICD-10-CM

## 2022-11-29 DIAGNOSIS — E11.9 CONTROLLED TYPE 2 DIABETES MELLITUS WITHOUT COMPLICATION, WITHOUT LONG-TERM CURRENT USE OF INSULIN (HCC): Primary | ICD-10-CM

## 2022-11-29 DIAGNOSIS — F32.A DEPRESSION, CONTROLLED: ICD-10-CM

## 2022-11-29 DIAGNOSIS — K21.9 GASTROESOPHAGEAL REFLUX DISEASE, UNSPECIFIED WHETHER ESOPHAGITIS PRESENT: ICD-10-CM

## 2022-11-29 DIAGNOSIS — Z23 NEED FOR PNEUMOCOCCAL VACCINE: ICD-10-CM

## 2022-11-29 DIAGNOSIS — E03.9 HYPOTHYROIDISM, ADULT: ICD-10-CM

## 2022-11-29 DIAGNOSIS — F41.9 ANXIETY: ICD-10-CM

## 2022-11-29 PROCEDURE — G8427 DOCREV CUR MEDS BY ELIG CLIN: HCPCS | Performed by: PHYSICIAN ASSISTANT

## 2022-11-29 PROCEDURE — 90677 PCV20 VACCINE IM: CPT | Performed by: PHYSICIAN ASSISTANT

## 2022-11-29 PROCEDURE — 3044F HG A1C LEVEL LT 7.0%: CPT | Performed by: PHYSICIAN ASSISTANT

## 2022-11-29 PROCEDURE — G8417 CALC BMI ABV UP PARAM F/U: HCPCS | Performed by: PHYSICIAN ASSISTANT

## 2022-11-29 PROCEDURE — G8484 FLU IMMUNIZE NO ADMIN: HCPCS | Performed by: PHYSICIAN ASSISTANT

## 2022-11-29 PROCEDURE — 90471 IMMUNIZATION ADMIN: CPT | Performed by: PHYSICIAN ASSISTANT

## 2022-11-29 PROCEDURE — 2022F DILAT RTA XM EVC RTNOPTHY: CPT | Performed by: PHYSICIAN ASSISTANT

## 2022-11-29 PROCEDURE — 3017F COLORECTAL CA SCREEN DOC REV: CPT | Performed by: PHYSICIAN ASSISTANT

## 2022-11-29 PROCEDURE — 1036F TOBACCO NON-USER: CPT | Performed by: PHYSICIAN ASSISTANT

## 2022-11-29 PROCEDURE — 99214 OFFICE O/P EST MOD 30 MIN: CPT | Performed by: PHYSICIAN ASSISTANT

## 2022-11-29 RX ORDER — LEVOTHYROXINE SODIUM 0.05 MG/1
50 TABLET ORAL
Qty: 90 TABLET | Refills: 3 | Status: SHIPPED | OUTPATIENT
Start: 2022-11-29

## 2022-11-29 RX ORDER — FAMOTIDINE 20 MG/1
20 TABLET, FILM COATED ORAL DAILY PRN
Qty: 90 TABLET | Refills: 3 | Status: SHIPPED | OUTPATIENT
Start: 2022-11-29

## 2022-11-29 ASSESSMENT — PATIENT HEALTH QUESTIONNAIRE - PHQ9
2. FEELING DOWN, DEPRESSED OR HOPELESS: 0
SUM OF ALL RESPONSES TO PHQ QUESTIONS 1-9: 0
SUM OF ALL RESPONSES TO PHQ9 QUESTIONS 1 & 2: 0
1. LITTLE INTEREST OR PLEASURE IN DOING THINGS: 0
SUM OF ALL RESPONSES TO PHQ QUESTIONS 1-9: 0

## 2022-11-29 ASSESSMENT — ANXIETY QUESTIONNAIRES
7. FEELING AFRAID AS IF SOMETHING AWFUL MIGHT HAPPEN: 0
1. FEELING NERVOUS, ANXIOUS, OR ON EDGE: 0
2. NOT BEING ABLE TO STOP OR CONTROL WORRYING: 0
GAD7 TOTAL SCORE: 3
5. BEING SO RESTLESS THAT IT IS HARD TO SIT STILL: 0
IF YOU CHECKED OFF ANY PROBLEMS ON THIS QUESTIONNAIRE, HOW DIFFICULT HAVE THESE PROBLEMS MADE IT FOR YOU TO DO YOUR WORK, TAKE CARE OF THINGS AT HOME, OR GET ALONG WITH OTHER PEOPLE: NOT DIFFICULT AT ALL
6. BECOMING EASILY ANNOYED OR IRRITABLE: 0
3. WORRYING TOO MUCH ABOUT DIFFERENT THINGS: 3
4. TROUBLE RELAXING: 0

## 2022-12-13 ENCOUNTER — OFFICE VISIT (OUTPATIENT)
Dept: BEHAVIORAL/MENTAL HEALTH CLINIC | Age: 57
End: 2022-12-13

## 2022-12-13 VITALS
HEIGHT: 62 IN | TEMPERATURE: 98.2 F | SYSTOLIC BLOOD PRESSURE: 114 MMHG | HEART RATE: 83 BPM | BODY MASS INDEX: 37.73 KG/M2 | WEIGHT: 205 LBS | OXYGEN SATURATION: 98 % | DIASTOLIC BLOOD PRESSURE: 78 MMHG

## 2022-12-13 DIAGNOSIS — F33.41 MDD (MAJOR DEPRESSIVE DISORDER), RECURRENT, IN PARTIAL REMISSION (HCC): Primary | ICD-10-CM

## 2022-12-13 ASSESSMENT — PATIENT HEALTH QUESTIONNAIRE - PHQ9
1. LITTLE INTEREST OR PLEASURE IN DOING THINGS: 0
SUM OF ALL RESPONSES TO PHQ9 QUESTIONS 1 & 2: 0
2. FEELING DOWN, DEPRESSED OR HOPELESS: 0
SUM OF ALL RESPONSES TO PHQ QUESTIONS 1-9: 0
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
## 2023-01-03 ENCOUNTER — TRANSCRIBE ORDERS (OUTPATIENT)
Dept: SCHEDULING | Age: 58
End: 2023-01-03

## 2023-01-03 DIAGNOSIS — Z12.31 SCREENING MAMMOGRAM FOR HIGH-RISK PATIENT: Primary | ICD-10-CM

## 2023-01-11 ENCOUNTER — OFFICE VISIT (OUTPATIENT)
Dept: BEHAVIORAL/MENTAL HEALTH CLINIC | Age: 58
End: 2023-01-11

## 2023-01-11 VITALS
DIASTOLIC BLOOD PRESSURE: 78 MMHG | OXYGEN SATURATION: 98 % | TEMPERATURE: 98.2 F | HEIGHT: 62 IN | BODY MASS INDEX: 37.73 KG/M2 | WEIGHT: 205 LBS | SYSTOLIC BLOOD PRESSURE: 108 MMHG | HEART RATE: 75 BPM

## 2023-01-11 DIAGNOSIS — F33.41 MDD (MAJOR DEPRESSIVE DISORDER), RECURRENT, IN PARTIAL REMISSION (HCC): ICD-10-CM

## 2023-01-11 RX ORDER — VILAZODONE HYDROCHLORIDE 40 MG/1
40 TABLET ORAL DAILY
Qty: 90 TABLET | Refills: 1 | Status: SHIPPED | OUTPATIENT
Start: 2023-01-11 | End: 2023-07-10

## 2023-01-11 ASSESSMENT — PATIENT HEALTH QUESTIONNAIRE - PHQ9
1. LITTLE INTEREST OR PLEASURE IN DOING THINGS: 0
2. FEELING DOWN, DEPRESSED OR HOPELESS: 0
SUM OF ALL RESPONSES TO PHQ QUESTIONS 1-9: 0
SUM OF ALL RESPONSES TO PHQ9 QUESTIONS 1 & 2: 0
SUM OF ALL RESPONSES TO PHQ QUESTIONS 1-9: 0
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
## 2023-01-25 NOTE — PROGRESS NOTES
Lab visit only
Post-Care Instructions: I reviewed with the patient in detail post-care instructions. Patient is to wear sunprotection, and avoid picking at any of the treated lesions. Pt may apply Vaseline to crusted or scabbing areas.
Consent: The patient's consent was obtained including but not limited to risks of crusting, scabbing, blistering, scarring, darker or lighter pigmentary change, recurrence, incomplete removal and infection.
Detail Level: Zone
Render In Bullet Format When Appropriate: No
Duration Of Freeze Thaw-Cycle (Seconds): [address] [facility]
Total Number Of Aks Treated: 5
Number Of Freeze-Thaw Cycles: 2 freeze-thaw cycles

## 2023-01-27 ENCOUNTER — HOSPITAL ENCOUNTER (OUTPATIENT)
Dept: MAMMOGRAPHY | Age: 58
Discharge: HOME | End: 2023-01-27
Payer: COMMERCIAL

## 2023-01-27 DIAGNOSIS — Z12.31 SCREENING MAMMOGRAM FOR HIGH-RISK PATIENT: ICD-10-CM

## 2023-01-27 PROCEDURE — 77067 SCR MAMMO BI INCL CAD: CPT

## 2023-03-27 NOTE — PROGRESS NOTES
Length of meeting;  55   minutes    Start Time: 0800    Stop Time: 0351    Diagnosis:  Generalized anxiety disorder. Major depressive disorder, recurrent, in partial remission. Treatment Plan: This is pended to next session as we will plan to review treatment goals at that time; the patient is given an assignment related to goal setting in session today. Patient Prognosis: Guarded at present time. Patient Progress: This is an initial visit for this patient who is referred by her psychiatrist, Dr. Yi PURDY) for depression and anxiety. The medical record is reviewed prior to this visit today. There is a history of long standing depression (notably better per Dr. Lawrence Anderson recent notes), and anxiety noted by persistent worry. The patient has lost both her mother and father in 2021 due to cancer and cardiac related issues, respectively, and is working with a grief counselor. Today, the patient indicates that she has not seen the grief counselor in a while. The issues related to settling the estate are largely settled. She notes that she has chronic and persistent worry. Notes that her mother was a worrier. Mental Status exam: PHQ-9 is done with a score of  6. JAZMIN-7 is done with a score of 4. Both are shared with the patient. The patient indicates her alcohol use is occasional, and that her drug use is none. No current active SI or HI. The patient agrees today that if thoughts of self-harm or harm of others begins to occur that she will call 911, or go to the nearest ER or [facility], prior to acting on these thoughts. Thought processes appear normal.  Speech is goal directed. The patient does not appear to be responding to internal stimuli. Plan: I will see this patient again on     .     BUSTER Schmitt

## 2023-03-28 ENCOUNTER — OFFICE VISIT (OUTPATIENT)
Dept: BEHAVIORAL/MENTAL HEALTH CLINIC | Age: 58
End: 2023-03-28

## 2023-03-28 DIAGNOSIS — F33.41 MDD (MAJOR DEPRESSIVE DISORDER), RECURRENT, IN PARTIAL REMISSION (HCC): Primary | ICD-10-CM

## 2023-03-28 DIAGNOSIS — F41.1 GENERALIZED ANXIETY DISORDER: ICD-10-CM

## 2023-03-28 ASSESSMENT — PATIENT HEALTH QUESTIONNAIRE - PHQ9
SUM OF ALL RESPONSES TO PHQ QUESTIONS 1-9: 6
SUM OF ALL RESPONSES TO PHQ QUESTIONS 1-9: 6
9. THOUGHTS THAT YOU WOULD BE BETTER OFF DEAD, OR OF HURTING YOURSELF: 0
7. TROUBLE CONCENTRATING ON THINGS, SUCH AS READING THE NEWSPAPER OR WATCHING TELEVISION: 0
1. LITTLE INTEREST OR PLEASURE IN DOING THINGS: 0
8. MOVING OR SPEAKING SO SLOWLY THAT OTHER PEOPLE COULD HAVE NOTICED. OR THE OPPOSITE, BEING SO FIGETY OR RESTLESS THAT YOU HAVE BEEN MOVING AROUND A LOT MORE THAN USUAL: 0
SUM OF ALL RESPONSES TO PHQ QUESTIONS 1-9: 6
10. IF YOU CHECKED OFF ANY PROBLEMS, HOW DIFFICULT HAVE THESE PROBLEMS MADE IT FOR YOU TO DO YOUR WORK, TAKE CARE OF THINGS AT HOME, OR GET ALONG WITH OTHER PEOPLE: 1
3. TROUBLE FALLING OR STAYING ASLEEP: 3
SUM OF ALL RESPONSES TO PHQ QUESTIONS 1-9: 6
4. FEELING TIRED OR HAVING LITTLE ENERGY: 1
SUM OF ALL RESPONSES TO PHQ9 QUESTIONS 1 & 2: 1
5. POOR APPETITE OR OVEREATING: 1
6. FEELING BAD ABOUT YOURSELF - OR THAT YOU ARE A FAILURE OR HAVE LET YOURSELF OR YOUR FAMILY DOWN: 0
2. FEELING DOWN, DEPRESSED OR HOPELESS: 1

## 2023-03-28 ASSESSMENT — ANXIETY QUESTIONNAIRES
GAD7 TOTAL SCORE: 4
1. FEELING NERVOUS, ANXIOUS, OR ON EDGE: 0
5. BEING SO RESTLESS THAT IT IS HARD TO SIT STILL: 0
6. BECOMING EASILY ANNOYED OR IRRITABLE: 1
4. TROUBLE RELAXING: 0
2. NOT BEING ABLE TO STOP OR CONTROL WORRYING: 2
7. FEELING AFRAID AS IF SOMETHING AWFUL MIGHT HAPPEN: 0
IF YOU CHECKED OFF ANY PROBLEMS ON THIS QUESTIONNAIRE, HOW DIFFICULT HAVE THESE PROBLEMS MADE IT FOR YOU TO DO YOUR WORK, TAKE CARE OF THINGS AT HOME, OR GET ALONG WITH OTHER PEOPLE: NOT DIFFICULT AT ALL
3. WORRYING TOO MUCH ABOUT DIFFERENT THINGS: 1

## 2023-05-03 NOTE — PROGRESS NOTES
Length of meeting;  55   minutes    Start Time: 0800    Stop Time: 5255    Diagnosis:  Generalized anxiety disorder. Major depressive disorder, recurrent, in partial remission. Treatment Plan: Will focus on anxiety based on persistent worry that negatively impacts her life, using anxiety exposure and response activation treatment. Will also focus on self care, related to issues with sleep, and being attentive to her overall health. Patient Prognosis: Guarded at present time. Patient Progress: The patient indicates that she is really depressed, noting \"I miss my Mom. \"  She is tearful and notes that she is mad at her Mom, because Jared Peña had secrets. \"  We talked about this at length. As we talked, a number of noted resentments came up and following discussion, she has agreed to write a letter to her Mom, placing everything that she would want to say to her mother, and then bring that into the next session and read it out loud. Guidelines are given for same. Will defer anxiety work in favor of doing grief work at this point in time. Mental Status exam: No current active SI or HI. The patient continues to contract for safety. agrees today that if thoughts of self-harm or harm  Thought processes appear normal.  Speech is goal directed. The patient does not appear to be responding to internal stimuli. Plan: I will see this patient again in 2 weeks.     BUSTER Canchola

## 2023-05-04 ENCOUNTER — OFFICE VISIT (OUTPATIENT)
Dept: BEHAVIORAL/MENTAL HEALTH CLINIC | Age: 58
End: 2023-05-04
Payer: COMMERCIAL

## 2023-05-04 DIAGNOSIS — F41.1 GENERALIZED ANXIETY DISORDER: Primary | ICD-10-CM

## 2023-05-04 DIAGNOSIS — F33.41 MDD (MAJOR DEPRESSIVE DISORDER), RECURRENT, IN PARTIAL REMISSION (HCC): ICD-10-CM

## 2023-05-04 PROCEDURE — 1036F TOBACCO NON-USER: CPT | Performed by: SOCIAL WORKER

## 2023-05-04 PROCEDURE — 90837 PSYTX W PT 60 MINUTES: CPT | Performed by: SOCIAL WORKER

## 2023-05-18 ENCOUNTER — OFFICE VISIT (OUTPATIENT)
Dept: BEHAVIORAL/MENTAL HEALTH CLINIC | Age: 58
End: 2023-05-18
Payer: COMMERCIAL

## 2023-05-18 DIAGNOSIS — F41.1 GENERALIZED ANXIETY DISORDER: Primary | ICD-10-CM

## 2023-05-18 DIAGNOSIS — F33.41 MDD (MAJOR DEPRESSIVE DISORDER), RECURRENT, IN PARTIAL REMISSION (HCC): ICD-10-CM

## 2023-05-18 PROCEDURE — 90837 PSYTX W PT 60 MINUTES: CPT | Performed by: SOCIAL WORKER

## 2023-05-18 PROCEDURE — 1036F TOBACCO NON-USER: CPT | Performed by: SOCIAL WORKER

## 2023-05-18 ASSESSMENT — ANXIETY QUESTIONNAIRES
IF YOU CHECKED OFF ANY PROBLEMS ON THIS QUESTIONNAIRE, HOW DIFFICULT HAVE THESE PROBLEMS MADE IT FOR YOU TO DO YOUR WORK, TAKE CARE OF THINGS AT HOME, OR GET ALONG WITH OTHER PEOPLE: SOMEWHAT DIFFICULT
3. WORRYING TOO MUCH ABOUT DIFFERENT THINGS: 3
5. BEING SO RESTLESS THAT IT IS HARD TO SIT STILL: 0
4. TROUBLE RELAXING: 2
2. NOT BEING ABLE TO STOP OR CONTROL WORRYING: 3
7. FEELING AFRAID AS IF SOMETHING AWFUL MIGHT HAPPEN: 1
1. FEELING NERVOUS, ANXIOUS, OR ON EDGE: 2
6. BECOMING EASILY ANNOYED OR IRRITABLE: 3
GAD7 TOTAL SCORE: 14

## 2023-05-18 NOTE — PROGRESS NOTES
Length of meeting;  60 minutes    Start Time: 6257    Stop Time: 8983    Diagnosis:  Generalized anxiety disorder. Major depressive disorder, recurrent, in partial remission. Treatment Plan: Will focus on anxiety based on persistent worry that negatively impacts her life, using anxiety exposure and response activation treatment. Will also focus on self care, related to issues with sleep, and being attentive to her overall health. Patient Prognosis: Guarded at present time. Patient Progress: The patient indicates that she has an aunt who has had a stroke, but is doing well. We spent the majority of the session dealing with her anxiety related to the event. She noted some of the related issues pertaining to her Mom and Dad's health issues. As part of our prior session and the patient identifying a number of issues with her  Mom that she was not able to complete prior to her mother's death, the patient had agreed to write a letter to her mother and then to bring that into the session today, and to read it out loud; due to the above, she was not able to do the assignment. We will plan to do this next session. Mental Status exam: JAZMIN-7 is a 14. No current active SI or HI. The patient continues to contract for safety. agrees today that if thoughts of self-harm or harm  Thought processes appear normal.  Speech is goal directed. The patient does not appear to be responding to internal stimuli. Plan: I will see this patient again in 3-4 weeks.     BUSTER Loera

## 2023-05-30 ENCOUNTER — NURSE ONLY (OUTPATIENT)
Dept: FAMILY MEDICINE CLINIC | Facility: CLINIC | Age: 58
End: 2023-05-30

## 2023-05-30 DIAGNOSIS — E11.9 CONTROLLED TYPE 2 DIABETES MELLITUS WITHOUT COMPLICATION, WITHOUT LONG-TERM CURRENT USE OF INSULIN (HCC): ICD-10-CM

## 2023-05-30 DIAGNOSIS — E78.5 DYSLIPIDEMIA: ICD-10-CM

## 2023-05-30 LAB
ALBUMIN SERPL-MCNC: 4 G/DL (ref 3.5–5)
ALBUMIN/GLOB SERPL: 1.2 (ref 0.4–1.6)
ALP SERPL-CCNC: 97 U/L (ref 50–136)
ALT SERPL-CCNC: 17 U/L (ref 12–65)
ANION GAP SERPL CALC-SCNC: 8 MMOL/L (ref 2–11)
AST SERPL-CCNC: 15 U/L (ref 15–37)
BILIRUB SERPL-MCNC: 0.3 MG/DL (ref 0.2–1.1)
BUN SERPL-MCNC: 17 MG/DL (ref 6–23)
CALCIUM SERPL-MCNC: 9.2 MG/DL (ref 8.3–10.4)
CHLORIDE SERPL-SCNC: 105 MMOL/L (ref 101–110)
CHOLEST SERPL-MCNC: 135 MG/DL
CO2 SERPL-SCNC: 26 MMOL/L (ref 21–32)
CREAT SERPL-MCNC: 0.9 MG/DL (ref 0.6–1)
CREAT UR-MCNC: 217 MG/DL
EST. AVERAGE GLUCOSE BLD GHB EST-MCNC: 146 MG/DL
GLOBULIN SER CALC-MCNC: 3.3 G/DL (ref 2.8–4.5)
GLUCOSE SERPL-MCNC: 120 MG/DL (ref 65–100)
HBA1C MFR BLD: 6.7 % (ref 4.8–5.6)
HDLC SERPL-MCNC: 45 MG/DL (ref 40–60)
HDLC SERPL: 3
LDLC SERPL CALC-MCNC: 69.4 MG/DL
MICROALBUMIN UR-MCNC: 1.41 MG/DL
MICROALBUMIN/CREAT UR-RTO: 6 MG/G (ref 0–30)
POTASSIUM SERPL-SCNC: 4.2 MMOL/L (ref 3.5–5.1)
PROT SERPL-MCNC: 7.3 G/DL (ref 6.3–8.2)
SODIUM SERPL-SCNC: 139 MMOL/L (ref 133–143)
TRIGL SERPL-MCNC: 103 MG/DL (ref 35–150)
VLDLC SERPL CALC-MCNC: 20.6 MG/DL (ref 6–23)

## 2023-06-02 SDOH — ECONOMIC STABILITY: TRANSPORTATION INSECURITY
IN THE PAST 12 MONTHS, HAS LACK OF TRANSPORTATION KEPT YOU FROM MEETINGS, WORK, OR FROM GETTING THINGS NEEDED FOR DAILY LIVING?: NO

## 2023-06-02 SDOH — ECONOMIC STABILITY: INCOME INSECURITY: HOW HARD IS IT FOR YOU TO PAY FOR THE VERY BASICS LIKE FOOD, HOUSING, MEDICAL CARE, AND HEATING?: NOT HARD AT ALL

## 2023-06-02 SDOH — ECONOMIC STABILITY: FOOD INSECURITY: WITHIN THE PAST 12 MONTHS, YOU WORRIED THAT YOUR FOOD WOULD RUN OUT BEFORE YOU GOT MONEY TO BUY MORE.: NEVER TRUE

## 2023-06-02 SDOH — ECONOMIC STABILITY: FOOD INSECURITY: WITHIN THE PAST 12 MONTHS, THE FOOD YOU BOUGHT JUST DIDN'T LAST AND YOU DIDN'T HAVE MONEY TO GET MORE.: NEVER TRUE

## 2023-06-02 SDOH — ECONOMIC STABILITY: HOUSING INSECURITY
IN THE LAST 12 MONTHS, WAS THERE A TIME WHEN YOU DID NOT HAVE A STEADY PLACE TO SLEEP OR SLEPT IN A SHELTER (INCLUDING NOW)?: NO

## 2023-06-05 ENCOUNTER — OFFICE VISIT (OUTPATIENT)
Dept: FAMILY MEDICINE CLINIC | Facility: CLINIC | Age: 58
End: 2023-06-05
Payer: COMMERCIAL

## 2023-06-05 ENCOUNTER — HOSPITAL ENCOUNTER (OUTPATIENT)
Dept: GENERAL RADIOLOGY | Age: 58
Discharge: HOME | End: 2023-06-08
Payer: COMMERCIAL

## 2023-06-05 VITALS
TEMPERATURE: 98.9 F | HEIGHT: 62 IN | SYSTOLIC BLOOD PRESSURE: 134 MMHG | WEIGHT: 210 LBS | DIASTOLIC BLOOD PRESSURE: 85 MMHG | BODY MASS INDEX: 38.64 KG/M2 | HEART RATE: 71 BPM | OXYGEN SATURATION: 96 % | RESPIRATION RATE: 14 BRPM

## 2023-06-05 DIAGNOSIS — E66.01 SEVERE OBESITY (BMI 35.0-39.9) WITH COMORBIDITY (HCC): ICD-10-CM

## 2023-06-05 DIAGNOSIS — E03.9 HYPOTHYROIDISM, ADULT: ICD-10-CM

## 2023-06-05 DIAGNOSIS — M25.552 LEFT HIP PAIN: ICD-10-CM

## 2023-06-05 DIAGNOSIS — E78.5 DYSLIPIDEMIA: ICD-10-CM

## 2023-06-05 DIAGNOSIS — F41.1 GAD (GENERALIZED ANXIETY DISORDER): ICD-10-CM

## 2023-06-05 DIAGNOSIS — F33.41 RECURRENT MAJOR DEPRESSIVE DISORDER, IN PARTIAL REMISSION (HCC): ICD-10-CM

## 2023-06-05 DIAGNOSIS — E11.9 CONTROLLED TYPE 2 DIABETES MELLITUS WITHOUT COMPLICATION, WITHOUT LONG-TERM CURRENT USE OF INSULIN (HCC): Primary | ICD-10-CM

## 2023-06-05 DIAGNOSIS — M25.552 LEFT HIP PAIN: Primary | ICD-10-CM

## 2023-06-05 PROBLEM — M16.0 BILATERAL HIP JOINT ARTHRITIS: Status: ACTIVE | Noted: 2023-06-05

## 2023-06-05 PROCEDURE — 3044F HG A1C LEVEL LT 7.0%: CPT | Performed by: PHYSICIAN ASSISTANT

## 2023-06-05 PROCEDURE — 73502 X-RAY EXAM HIP UNI 2-3 VIEWS: CPT

## 2023-06-05 PROCEDURE — 2022F DILAT RTA XM EVC RTNOPTHY: CPT | Performed by: PHYSICIAN ASSISTANT

## 2023-06-05 PROCEDURE — 3017F COLORECTAL CA SCREEN DOC REV: CPT | Performed by: PHYSICIAN ASSISTANT

## 2023-06-05 PROCEDURE — G8417 CALC BMI ABV UP PARAM F/U: HCPCS | Performed by: PHYSICIAN ASSISTANT

## 2023-06-05 PROCEDURE — 1036F TOBACCO NON-USER: CPT | Performed by: PHYSICIAN ASSISTANT

## 2023-06-05 PROCEDURE — G8427 DOCREV CUR MEDS BY ELIG CLIN: HCPCS | Performed by: PHYSICIAN ASSISTANT

## 2023-06-05 PROCEDURE — 99214 OFFICE O/P EST MOD 30 MIN: CPT | Performed by: PHYSICIAN ASSISTANT

## 2023-06-05 RX ORDER — SEMAGLUTIDE 1.34 MG/ML
1 INJECTION, SOLUTION SUBCUTANEOUS WEEKLY
Qty: 9 ML | Refills: 3 | Status: SHIPPED | OUTPATIENT
Start: 2023-06-05

## 2023-06-05 RX ORDER — LISINOPRIL 5 MG/1
5 TABLET ORAL DAILY
Qty: 90 TABLET | Refills: 3 | Status: SHIPPED | OUTPATIENT
Start: 2023-06-05

## 2023-06-05 RX ORDER — ROSUVASTATIN CALCIUM 5 MG/1
5 TABLET, COATED ORAL DAILY
Qty: 90 TABLET | Refills: 3 | Status: SHIPPED | OUTPATIENT
Start: 2023-06-05

## 2023-06-12 ENCOUNTER — HOSPITAL ENCOUNTER (OUTPATIENT)
Dept: PHYSICAL THERAPY | Age: 58
Setting detail: RECURRING SERIES
End: 2023-06-12
Payer: COMMERCIAL

## 2023-06-21 ENCOUNTER — HOSPITAL ENCOUNTER (OUTPATIENT)
Dept: PHYSICAL THERAPY | Age: 58
Setting detail: RECURRING SERIES
Discharge: HOME | End: 2023-06-24
Payer: COMMERCIAL

## 2023-06-21 PROCEDURE — 97110 THERAPEUTIC EXERCISES: CPT

## 2023-06-21 PROCEDURE — 97530 THERAPEUTIC ACTIVITIES: CPT

## 2023-06-21 ASSESSMENT — PAIN SCALES - GENERAL: PAINLEVEL_OUTOF10: 3

## 2023-06-21 NOTE — PROGRESS NOTES
Alonso Scruggs  : 1965  Primary: [address]  Secondary:  [address] @ [address] [address]  Phone: [phone]  Fax: [phone] Plan Frequency: 2x a week for up to 90 days  Plan of Care/Certification Expiration Date: 23      PT Visit Info: Total # of Visits to Date: 2  Progress Note Counter: 2      OUTPATIENT PHYSICAL THERAPY:OP NOTE TYPE: Treatment Note 2023       Episode  }Appt Desk              Treatment Diagnosis:  Pain in left hip (M25.552)  Stiffness of Left Hip, Not elsewhere classified (B21.711)  Medical/Referring Diagnosis:  Left hip pain [M25.552]  Referring Physician:  Mohini De La Garza MD Orders:  PT Eval and Treat   Date of Onset:  No data recorded   Allergies:   Amoxicillin  Restrictions/Precautions:  No data recordedNo data recorded     Interventions Planned (Treatment may consist of any combination of the following):    Current Treatment Recommendations: Strengthening; ROM; Balance training; Functional mobility training; Transfer training; Endurance training; Gait training; Stair training; Neuromuscular re-education; Manual; Pain management; Return to work related activity; Home exercise program; Safety education & training; Modalities; Positioning; Therapeutic activities     Subjective Comments:  pt report some pain in the LLE hip  Initial:}    3/10Post Session:       2/10  Medications Last Reviewed:  2023  Updated Objective Findings:  See evaluation note from today  Treatment   THERAPEUTIC EXERCISE: (15 minutes):    Exercises per grid below to improve mobility, strength, balance and coordination. Required no visual, verbal, manual and tactile cues to promote proper body alignment, promote proper body posture, promote proper body mechanics and promote proper body breathing techniques.   Progressed resistance, range, repetitions and complexity of movement as

## 2023-06-22 ENCOUNTER — OFFICE VISIT (OUTPATIENT)
Dept: BEHAVIORAL/MENTAL HEALTH CLINIC | Age: 58
End: 2023-06-22
Payer: COMMERCIAL

## 2023-06-22 DIAGNOSIS — F33.41 MDD (MAJOR DEPRESSIVE DISORDER), RECURRENT, IN PARTIAL REMISSION (HCC): ICD-10-CM

## 2023-06-22 DIAGNOSIS — F41.1 GENERALIZED ANXIETY DISORDER: Primary | ICD-10-CM

## 2023-06-22 PROCEDURE — 1036F TOBACCO NON-USER: CPT | Performed by: SOCIAL WORKER

## 2023-06-22 PROCEDURE — 90791 PSYCH DIAGNOSTIC EVALUATION: CPT | Performed by: SOCIAL WORKER

## 2023-06-22 ASSESSMENT — ANXIETY QUESTIONNAIRES
GAD7 TOTAL SCORE: 5
7. FEELING AFRAID AS IF SOMETHING AWFUL MIGHT HAPPEN: 0
1. FEELING NERVOUS, ANXIOUS, OR ON EDGE: 1
4. TROUBLE RELAXING: 0
2. NOT BEING ABLE TO STOP OR CONTROL WORRYING: 1
6. BECOMING EASILY ANNOYED OR IRRITABLE: 2
IF YOU CHECKED OFF ANY PROBLEMS ON THIS QUESTIONNAIRE, HOW DIFFICULT HAVE THESE PROBLEMS MADE IT FOR YOU TO DO YOUR WORK, TAKE CARE OF THINGS AT HOME, OR GET ALONG WITH OTHER PEOPLE: SOMEWHAT DIFFICULT
3. WORRYING TOO MUCH ABOUT DIFFERENT THINGS: 1
5. BEING SO RESTLESS THAT IT IS HARD TO SIT STILL: 0

## 2023-06-22 NOTE — PROGRESS NOTES
Length of meeting;  59  minutes    Start Time: 0800    Stop Time: 0859    Diagnosis:  Generalized anxiety disorder. Major depressive disorder, recurrent, in partial remission. Treatment Plan: Focus on loss issues related to her mother. Will eventually focus on anxiety based on persistent worry that negatively impacts her life, using anxiety exposure and response activation treatment. Will also focus on self care, related to issues with sleep, and being attentive to her overall health. Patient Prognosis: Guarded at present time. Patient Progress: The patient indicates that she has been \"mostly good\" but notes that there have been issues with an aunt post stroke. As part of our prior session and the patient identifying a number of issues with her  Mom that she was not able to complete prior to her mother's death, the patient had agreed to write a letter to her mother and then to bring that into the session today, and to read it out loud; due to the above, she was not able to do the assignment in the last session. Today, she indicates that she thinks this will be hard, and notes that \"I already can't see because I will cry to much. \"  Pt then read the letter out loud; that will not be detailed here. We dicussed some pat aspects of this this letter, including some issues that she encountered as a child. She plans to go home today and burn the letter in her fire pit. Given material on anxiety to read for discussion next session. We will start focus on anxiety at this point. Mental Status exam: JAZMIN-7 is a 5; her prior one was a 14. No current active SI or HI. The patient continues to contract for safety. agrees today that if thoughts of self-harm or harm  Thought processes appear normal.  Speech is goal directed. The patient does not appear to be responding to internal stimuli. Plan: I will see this patient again in 3 weeks.     BUSTER Calvin

## 2023-06-28 ENCOUNTER — HOSPITAL ENCOUNTER (OUTPATIENT)
Dept: PHYSICAL THERAPY | Age: 58
Setting detail: RECURRING SERIES
Discharge: HOME | End: 2023-07-01
Payer: COMMERCIAL

## 2023-06-28 PROCEDURE — 97110 THERAPEUTIC EXERCISES: CPT

## 2023-06-28 PROCEDURE — 97530 THERAPEUTIC ACTIVITIES: CPT

## 2023-06-28 ASSESSMENT — PAIN SCALES - GENERAL: PAINLEVEL_OUTOF10: 2

## 2023-06-29 ENCOUNTER — APPOINTMENT (OUTPATIENT)
Dept: PHYSICAL THERAPY | Age: 58
End: 2023-06-29
Payer: COMMERCIAL

## 2023-07-05 ENCOUNTER — OFFICE VISIT (OUTPATIENT)
Dept: BEHAVIORAL/MENTAL HEALTH CLINIC | Age: 58
End: 2023-07-05
Payer: COMMERCIAL

## 2023-07-05 ENCOUNTER — HOSPITAL ENCOUNTER (OUTPATIENT)
Dept: PHYSICAL THERAPY | Age: 58
Setting detail: RECURRING SERIES
Discharge: HOME | End: 2023-07-08
Payer: COMMERCIAL

## 2023-07-05 VITALS
DIASTOLIC BLOOD PRESSURE: 76 MMHG | HEART RATE: 72 BPM | HEIGHT: 62 IN | BODY MASS INDEX: 38.64 KG/M2 | SYSTOLIC BLOOD PRESSURE: 114 MMHG | WEIGHT: 210 LBS

## 2023-07-05 DIAGNOSIS — F33.42 MDD (MAJOR DEPRESSIVE DISORDER), RECURRENT, IN FULL REMISSION (HCC): Primary | ICD-10-CM

## 2023-07-05 PROCEDURE — G8417 CALC BMI ABV UP PARAM F/U: HCPCS | Performed by: STUDENT IN AN ORGANIZED HEALTH CARE EDUCATION/TRAINING PROGRAM

## 2023-07-05 PROCEDURE — 97112 NEUROMUSCULAR REEDUCATION: CPT

## 2023-07-05 PROCEDURE — 97110 THERAPEUTIC EXERCISES: CPT

## 2023-07-05 PROCEDURE — 99213 OFFICE O/P EST LOW 20 MIN: CPT | Performed by: STUDENT IN AN ORGANIZED HEALTH CARE EDUCATION/TRAINING PROGRAM

## 2023-07-05 PROCEDURE — 1036F TOBACCO NON-USER: CPT | Performed by: STUDENT IN AN ORGANIZED HEALTH CARE EDUCATION/TRAINING PROGRAM

## 2023-07-05 PROCEDURE — 3017F COLORECTAL CA SCREEN DOC REV: CPT | Performed by: STUDENT IN AN ORGANIZED HEALTH CARE EDUCATION/TRAINING PROGRAM

## 2023-07-05 PROCEDURE — G8427 DOCREV CUR MEDS BY ELIG CLIN: HCPCS | Performed by: STUDENT IN AN ORGANIZED HEALTH CARE EDUCATION/TRAINING PROGRAM

## 2023-07-05 RX ORDER — VILAZODONE HYDROCHLORIDE 40 MG/1
40 TABLET ORAL DAILY
Qty: 90 TABLET | Refills: 1 | Status: SHIPPED | OUTPATIENT
Start: 2023-07-05 | End: 2024-01-01

## 2023-07-05 RX ORDER — VILAZODONE HYDROCHLORIDE 40 MG/1
40 TABLET ORAL DAILY
Qty: 90 TABLET | Refills: 1 | Status: SHIPPED | OUTPATIENT
Start: 2023-07-05 | End: 2023-07-05 | Stop reason: SDUPTHER

## 2023-07-05 ASSESSMENT — PATIENT HEALTH QUESTIONNAIRE - PHQ9
2. FEELING DOWN, DEPRESSED OR HOPELESS: 1
5. POOR APPETITE OR OVEREATING: 1
10. IF YOU CHECKED OFF ANY PROBLEMS, HOW DIFFICULT HAVE THESE PROBLEMS MADE IT FOR YOU TO DO YOUR WORK, TAKE CARE OF THINGS AT HOME, OR GET ALONG WITH OTHER PEOPLE: 1
SUM OF ALL RESPONSES TO PHQ QUESTIONS 1-9: 8
SUM OF ALL RESPONSES TO PHQ QUESTIONS 1-9: 8
9. THOUGHTS THAT YOU WOULD BE BETTER OFF DEAD, OR OF HURTING YOURSELF: 0
1. LITTLE INTEREST OR PLEASURE IN DOING THINGS: 0
SUM OF ALL RESPONSES TO PHQ QUESTIONS 1-9: 8
SUM OF ALL RESPONSES TO PHQ QUESTIONS 1-9: 8
3. TROUBLE FALLING OR STAYING ASLEEP: 3
4. FEELING TIRED OR HAVING LITTLE ENERGY: 3
SUM OF ALL RESPONSES TO PHQ9 QUESTIONS 1 & 2: 1
8. MOVING OR SPEAKING SO SLOWLY THAT OTHER PEOPLE COULD HAVE NOTICED. OR THE OPPOSITE, BEING SO FIGETY OR RESTLESS THAT YOU HAVE BEEN MOVING AROUND A LOT MORE THAN USUAL: 0
7. TROUBLE CONCENTRATING ON THINGS, SUCH AS READING THE NEWSPAPER OR WATCHING TELEVISION: 0
6. FEELING BAD ABOUT YOURSELF - OR THAT YOU ARE A FAILURE OR HAVE LET YOURSELF OR YOUR FAMILY DOWN: 0

## 2023-07-05 ASSESSMENT — PAIN SCALES - GENERAL: PAINLEVEL_OUTOF10: 1

## 2023-07-05 NOTE — PROGRESS NOTES
Lorna Scruggs  : 1965  Primary: [address] [place] [address]  Secondary:  [address] [place] [address] [address]  Phone: [phone]  Fax: [phone] Plan Frequency: 2x a week for up to 90 days  Plan of Care/Certification Expiration Date: 23      PT Visit Info: Total # of Visits to Date: 4  Progress Note Counter: 4      OUTPATIENT PHYSICAL THERAPY:OP NOTE TYPE: Treatment Note 2023       Episode  }Appt Desk              Treatment Diagnosis:  Pain in left hip (M25.552)  Stiffness of Left Hip, Not elsewhere classified (L28.891)  Medical/Referring Diagnosis:  Left hip pain [M25.552]  Referring Physician:  Fox Gomez MD Orders:  PT Eval and Treat   Date of Onset:  No data recorded   Allergies:   Amoxicillin  Restrictions/Precautions:  No data recordedNo data recorded     Interventions Planned (Treatment may consist of any combination of the following):    Current Treatment Recommendations: Strengthening; ROM; Balance training; Functional mobility training; Transfer training; Endurance training; Gait training; Stair training; Neuromuscular re-education; Manual; Pain management; Return to work related activity; Home exercise program; Safety education & training; Modalities; Positioning; Therapeutic activities     Subjective Comments:  pt reports some improvement in the LLE hip  Initial:}    1/10Post Session:       1/10  Medications Last Reviewed:  2023  Updated Objective Findings:  See evaluation note from today  Treatment   THERAPEUTIC EXERCISE: (15 minutes):    Exercises per grid below to improve mobility, strength, balance and coordination. Required no visual, verbal, manual and tactile cues to promote proper body alignment, promote proper body posture, promote proper body mechanics and promote proper body breathing techniques.   Progressed resistance, range, repetitions and complexity of movement as

## 2023-07-11 NOTE — PROGRESS NOTES
Length of meeting;  57  minutes    Start Time: 9396    Stop Time: 2155    Diagnosis:  Generalized anxiety disorder. Major depressive disorder, recurrent, in partial remission. Treatment Plan: Focus on loss issues related to her mother. Will eventually focus on anxiety based on persistent worry that negatively impacts her life, using anxiety exposure and response activation treatment. Will also focus on self care, related to issues with sleep, and being attentive to her overall health. Patient Prognosis: Guarded at present time. Patient Progress: The patient indicates that she found great relief and paucity of thought related to prior issues with her mother after she read the letter out loud to her mother in the past session. She notes \"it needed to be out. \"  Despite her overall lower anxiety today, she would like to proceed as much of this is involved in relationships. We focused on material on anxiety, given to her in the prior session. For the next session, I have asked her to both write the story of her anxiety, as well as the story of what she is missing because of her anxiety, and to be prepared to read both out loud in the next session. She agrees to the above assignment. Mental Status exam: JAZMIN-7 is a 4; her prior one was a 5. No current active SI or HI. The patient continues to contract for safety. Thought processes appear normal.  Speech is goal directed. The patient does not appear to be responding to internal stimuli. Plan: I will see this patient again in 2-3 weeks.     BUSTER Michaels

## 2023-07-12 ENCOUNTER — OFFICE VISIT (OUTPATIENT)
Dept: BEHAVIORAL/MENTAL HEALTH CLINIC | Age: 58
End: 2023-07-12
Payer: COMMERCIAL

## 2023-07-12 ENCOUNTER — HOSPITAL ENCOUNTER (OUTPATIENT)
Dept: PHYSICAL THERAPY | Age: 58
Setting detail: RECURRING SERIES
Discharge: HOME | End: 2023-07-15
Payer: COMMERCIAL

## 2023-07-12 DIAGNOSIS — F41.1 GENERALIZED ANXIETY DISORDER: ICD-10-CM

## 2023-07-12 DIAGNOSIS — F33.41 MDD (MAJOR DEPRESSIVE DISORDER), RECURRENT, IN PARTIAL REMISSION (HCC): Primary | ICD-10-CM

## 2023-07-12 PROCEDURE — 1036F TOBACCO NON-USER: CPT | Performed by: SOCIAL WORKER

## 2023-07-12 PROCEDURE — 97110 THERAPEUTIC EXERCISES: CPT

## 2023-07-12 PROCEDURE — 90837 PSYTX W PT 60 MINUTES: CPT | Performed by: SOCIAL WORKER

## 2023-07-12 PROCEDURE — 97112 NEUROMUSCULAR REEDUCATION: CPT

## 2023-07-12 ASSESSMENT — ANXIETY QUESTIONNAIRES
2. NOT BEING ABLE TO STOP OR CONTROL WORRYING: 1
5. BEING SO RESTLESS THAT IT IS HARD TO SIT STILL: 0
7. FEELING AFRAID AS IF SOMETHING AWFUL MIGHT HAPPEN: 0
6. BECOMING EASILY ANNOYED OR IRRITABLE: 2
1. FEELING NERVOUS, ANXIOUS, OR ON EDGE: 0
3. WORRYING TOO MUCH ABOUT DIFFERENT THINGS: 1
IF YOU CHECKED OFF ANY PROBLEMS ON THIS QUESTIONNAIRE, HOW DIFFICULT HAVE THESE PROBLEMS MADE IT FOR YOU TO DO YOUR WORK, TAKE CARE OF THINGS AT HOME, OR GET ALONG WITH OTHER PEOPLE: NOT DIFFICULT AT ALL
4. TROUBLE RELAXING: 0
GAD7 TOTAL SCORE: 4

## 2023-07-12 ASSESSMENT — PAIN SCALES - GENERAL: PAINLEVEL_OUTOF10: 1

## 2023-07-12 NOTE — PROGRESS NOTES
Jackie Scruggs  : 1965  Primary: [address]  Secondary:  [address] [place] [address]  [address] [address]  Phone: [phone]  Fax: [phone] Plan Frequency: 2x a week for up to 90 days  Plan of Care/Certification Expiration Date: 23      PT Visit Info: Total # of Visits to Date: 5  Progress Note Counter: 5      OUTPATIENT PHYSICAL THERAPY:OP NOTE TYPE: Treatment Note 2023       Episode  }Appt Desk              Treatment Diagnosis:  Pain in left hip (M25.552)  Stiffness of Left Hip, Not elsewhere classified (Z31.077)  Medical/Referring Diagnosis:  Left hip pain [M25.552]  Referring Physician:  Leonor Ingram MD Orders:  PT Eval and Treat   Date of Onset:  No data recorded   Allergies:   Amoxicillin  Restrictions/Precautions:  No data recordedNo data recorded     Interventions Planned (Treatment may consist of any combination of the following):    Current Treatment Recommendations: Strengthening; ROM; Balance training; Functional mobility training; Transfer training; Endurance training; Gait training; Stair training; Neuromuscular re-education; Manual; Pain management; Return to work related activity; Home exercise program; Safety education & training; Modalities; Positioning; Therapeutic activities     Subjective Comments:  pt reports improvement with LLE hip pain  Initial:}    1/10Post Session:       1/10  Medications Last Reviewed:  2023  Updated Objective Findings:  See evaluation note from today  Treatment   THERAPEUTIC EXERCISE: (15 minutes):    Exercises per grid below to improve mobility, strength, balance and coordination. Required no visual, verbal, manual and tactile cues to promote proper body alignment, promote proper body posture, promote proper body mechanics and promote proper body breathing techniques.   Progressed resistance, range, repetitions and complexity of movement as

## 2023-07-21 ENCOUNTER — HOSPITAL ENCOUNTER (OUTPATIENT)
Dept: PHYSICAL THERAPY | Age: 58
Setting detail: RECURRING SERIES
Discharge: HOME | End: 2023-07-24
Payer: COMMERCIAL

## 2023-07-21 PROCEDURE — 97110 THERAPEUTIC EXERCISES: CPT

## 2023-07-21 PROCEDURE — 97112 NEUROMUSCULAR REEDUCATION: CPT

## 2023-07-21 NOTE — PROGRESS NOTES
Abram Scruggs  : 1965  Primary: [address] [place] [address]  Secondary:  [address] [place] [address] [address]  Phone: [phone]  Fax: [phone] Plan Frequency: 2x a week for up to 90 days  Plan of Care/Certification Expiration Date: 23      PT Visit Info: Total # of Visits to Date: 6  Progress Note Counter: 6      OUTPATIENT PHYSICAL THERAPY:OP NOTE TYPE: Treatment Note 2023       Episode  }Appt Desk              Treatment Diagnosis:  Pain in left hip (M25.552)  Stiffness of Left Hip, Not elsewhere classified (D16.973)  Medical/Referring Diagnosis:  Left hip pain [M25.552]  Referring Physician:  Darvin Wallace MD Orders:  PT Eval and Treat   Date of Onset:  No data recorded   Allergies:   Amoxicillin  Restrictions/Precautions:  No data recordedNo data recorded     Interventions Planned (Treatment may consist of any combination of the following):    Current Treatment Recommendations: Strengthening; ROM; Balance training; Functional mobility training; Transfer training; Endurance training; Gait training; Stair training; Neuromuscular re-education; Manual; Pain management; Return to work related activity; Home exercise program; Safety education & training; Modalities; Positioning; Therapeutic activities     Subjective Comments:  pt reports improvement still has some soreness/stiffness  Initial:}     /10Post Session:        /10  Medications Last Reviewed:  2023  Updated Objective Findings:  See evaluation note from today  Treatment   THERAPEUTIC EXERCISE: (15 minutes):    Exercises per grid below to improve mobility, strength, balance and coordination. Required no visual, verbal, manual and tactile cues to promote proper body alignment, promote proper body posture, promote proper body mechanics and promote proper body breathing techniques.   Progressed resistance, range, repetitions and complexity of movement as

## 2023-07-25 NOTE — PROGRESS NOTES
Length of meeting; 55  minutes    Start Time: 0800    Stop Time: 1788    Diagnosis:  Generalized anxiety disorder. Major depressive disorder, recurrent, in partial remission. Treatment Plan: Focus on loss issues related to her mother. Will eventually focus on anxiety based on persistent worry that negatively impacts her life, using anxiety exposure and response activation treatment. Will also focus on self care, related to issues with sleep, and being attentive to her overall health. Patient Prognosis: Guarded at present time. Patient Progress: The patient indicates that she  is OK; noting a couple of stressors. From prior assignment, I had asked her to both write the story of her anxiety, as well as the story of what she is missing because of her anxiety. She read both, out loud in the session today, and we reviewed both. Noted to write her story of her anxiety in the third person. Encouraged to use \"I\" in the place of \"she\" in future assignments. Noted to indicate that worry is something that she does when she loves someone; this is discussed. For the next assignment, I have asked her to:   Monitor and write down automatic thoughts   If negative, what would a more helpful thought be? She is given a spreadsheet to assist in documentation of the above. Pt noted to indicate that \"this will be hard\"; talked with her about engaging in fortune telling, and encouraged her to remain focused in the present, versus attempting to predict her future in therapy. Mental Status exam: No current active SI or HI. The patient continues to contract for safety. Thought processes appear normal.  Speech is goal directed. The patient does not appear to be responding to internal stimuli. Plan: I will see this patient again in 2 weeks.     BUSTER Barreto

## 2023-07-26 ENCOUNTER — OFFICE VISIT (OUTPATIENT)
Dept: BEHAVIORAL/MENTAL HEALTH CLINIC | Age: 58
End: 2023-07-26
Payer: COMMERCIAL

## 2023-07-26 DIAGNOSIS — F41.1 GENERALIZED ANXIETY DISORDER: Primary | ICD-10-CM

## 2023-07-26 DIAGNOSIS — F33.41 MDD (MAJOR DEPRESSIVE DISORDER), RECURRENT, IN PARTIAL REMISSION (HCC): ICD-10-CM

## 2023-07-26 PROCEDURE — 90837 PSYTX W PT 60 MINUTES: CPT | Performed by: SOCIAL WORKER

## 2023-07-26 PROCEDURE — 1036F TOBACCO NON-USER: CPT | Performed by: SOCIAL WORKER

## 2023-07-27 ENCOUNTER — HOSPITAL ENCOUNTER (OUTPATIENT)
Dept: PHYSICAL THERAPY | Age: 58
Setting detail: RECURRING SERIES
Discharge: HOME | End: 2023-07-30
Payer: COMMERCIAL

## 2023-07-27 PROCEDURE — 97110 THERAPEUTIC EXERCISES: CPT

## 2023-07-27 PROCEDURE — 97112 NEUROMUSCULAR REEDUCATION: CPT
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
## 2023-08-04 ENCOUNTER — APPOINTMENT (OUTPATIENT)
Dept: PHYSICAL THERAPY | Age: 58
End: 2023-08-04
Payer: COMMERCIAL

## 2023-08-08 NOTE — PROGRESS NOTES
Length of meeting;  54 minutes    Start Time: 0803    Stop Time: 4081    Diagnosis:  Generalized anxiety disorder. Major depressive disorder, recurrent, in partial remission. Treatment Plan: Focus on loss issues related to her mother. Will eventually focus on anxiety based on persistent worry that negatively impacts her life, using anxiety exposure and response activation treatment. Will also focus on self care, related to issues with sleep, and being attentive to her overall health. Patient Prognosis: Guarded at present time. Patient Progress: The patient indicates that she  is [address] [facility]; notes that she worried about a situation that did not turn out as she thought in a significant manner. We discussed this. From prior assignment, I have asked her to:   Monitor and write down automatic thoughts   If negative, what would a more helpful thought be? This is reviewed. She notes \"this revolves around everyone else's health. \"  Noted to have difficulty forming her thoughts and so this was focused upon; I have asked her to to this assignment again. Mental Status exam: No current active SI or HI. The patient continues to contract for safety. Thought processes appear normal.  Speech is goal directed. The patient does not appear to be responding to internal stimuli. Plan: I will see this patient again in 2-3 weeks.     BUSTER Hampton

## 2023-08-09 ENCOUNTER — HOSPITAL ENCOUNTER (OUTPATIENT)
Dept: PHYSICAL THERAPY | Age: 58
Setting detail: RECURRING SERIES
Discharge: HOME | End: 2023-08-12
Payer: COMMERCIAL

## 2023-08-09 ENCOUNTER — OFFICE VISIT (OUTPATIENT)
Dept: BEHAVIORAL/MENTAL HEALTH CLINIC | Age: 58
End: 2023-08-09
Payer: COMMERCIAL

## 2023-08-09 DIAGNOSIS — F33.41 MDD (MAJOR DEPRESSIVE DISORDER), RECURRENT, IN PARTIAL REMISSION (HCC): ICD-10-CM

## 2023-08-09 DIAGNOSIS — F41.1 GENERALIZED ANXIETY DISORDER: Primary | ICD-10-CM

## 2023-08-09 PROCEDURE — 1036F TOBACCO NON-USER: CPT | Performed by: SOCIAL WORKER

## 2023-08-09 PROCEDURE — 90837 PSYTX W PT 60 MINUTES: CPT | Performed by: SOCIAL WORKER

## 2023-08-09 PROCEDURE — 97110 THERAPEUTIC EXERCISES: CPT

## 2023-08-09 PROCEDURE — 97112 NEUROMUSCULAR REEDUCATION: CPT

## 2023-08-09 PROCEDURE — 97530 THERAPEUTIC ACTIVITIES: CPT

## 2023-08-09 ASSESSMENT — PAIN SCALES - GENERAL: PAINLEVEL_OUTOF10: 4

## 2023-08-09 NOTE — PROGRESS NOTES
Tavon Scruggs  : 1965  Primary: [address]  Secondary:  [address] [facility]  [address] [address]  Phone: [phone]  Fax: [phone] Plan Frequency: 2x a week for up to 90 days  Plan of Care/Certification Expiration Date: 23      PT Visit Info: Total # of Visits to Date: 8  Progress Note Counter: 8      OUTPATIENT PHYSICAL THERAPY:OP NOTE TYPE: Treatment Note 2023       Episode  }Appt Desk              Treatment Diagnosis:  Pain in left hip (M25.552)  Stiffness of Left Hip, Not elsewhere classified (X82.239)  Medical/Referring Diagnosis:  Left hip pain [M25.552]  Referring Physician:  Ananth Torres MD Orders:  PT Eval and Treat   Date of Onset:  No data recorded   Allergies:   Amoxicillin  Restrictions/Precautions:  No data recordedNo data recorded     Interventions Planned (Treatment may consist of any combination of the following):    Current Treatment Recommendations: Strengthening; ROM; Balance training; Functional mobility training; Transfer training; Endurance training; Gait training; Stair training; Neuromuscular re-education; Manual; Pain management; Return to work related activity; Home exercise program; Safety education & training; Modalities; Positioning; Therapeutic activities     Subjective Comments:  pt reports some soreness in the LLE hip just recently increased in soreness  Initial:}    4/10Post Session:       3/10  Medications Last Reviewed:  2023  Updated Objective Findings:  See evaluation note from today  Treatment   THERAPEUTIC EXERCISE: (15 minutes):    Exercises per grid below to improve mobility, strength, balance and coordination. Required no visual, verbal, manual and tactile cues to promote proper body alignment, promote proper body posture, promote proper body mechanics and promote proper body breathing techniques.   Progressed resistance, range, repetitions and

## 2023-08-16 ENCOUNTER — HOSPITAL ENCOUNTER (OUTPATIENT)
Dept: PHYSICAL THERAPY | Age: 58
Setting detail: RECURRING SERIES
Discharge: HOME | End: 2023-08-19
Payer: COMMERCIAL

## 2023-08-16 PROCEDURE — 97110 THERAPEUTIC EXERCISES: CPT

## 2023-08-16 PROCEDURE — 97112 NEUROMUSCULAR REEDUCATION: CPT

## 2023-08-16 ASSESSMENT — PAIN SCALES - GENERAL: PAINLEVEL_OUTOF10: 3

## 2023-08-16 NOTE — PROGRESS NOTES
Ananda Scruggs  : 1965  Primary: [place] [address]  Secondary:  [address] [facility]  [address] [address]  Phone: [phone]  Fax: [phone] Plan Frequency: 2x a week for up to 90 days  Plan of Care/Certification Expiration Date: 23      PT Visit Info: Total # of Visits to Date: 9  Progress Note Counter: 9      OUTPATIENT PHYSICAL THERAPY:OP NOTE TYPE: Treatment Note 2023       Episode  }Appt Desk              Treatment Diagnosis:  Pain in left hip (M25.552)  Stiffness of Left Hip, Not elsewhere classified (Y77.331)  Medical/Referring Diagnosis:  Left hip pain [M25.552]  Referring Physician:  Suresh Zuñiga MD Orders:  PT Eval and Treat   Date of Onset:  No data recorded   Allergies:   Amoxicillin  Restrictions/Precautions:  No data recordedNo data recorded     Interventions Planned (Treatment may consist of any combination of the following):    Current Treatment Recommendations: Strengthening; ROM; Balance training; Functional mobility training; Transfer training; Endurance training; Gait training; Stair training; Neuromuscular re-education; Manual; Pain management; Return to work related activity; Home exercise program; Safety education & training; Modalities; Positioning; Therapeutic activities     Subjective Comments:  pt reports some soreness in the LLE hip  Initial:}    3/10Post Session:       2/10  Medications Last Reviewed:  2023  Updated Objective Findings:  See evaluation note from today  Treatment   THERAPEUTIC EXERCISE: (15 minutes):    Exercises per grid below to improve mobility, strength, balance and coordination. Required no visual, verbal, manual and tactile cues to promote proper body alignment, promote proper body posture, promote proper body mechanics and promote proper body breathing techniques.   Progressed resistance, range, repetitions and complexity of movement as

## 2023-08-22 NOTE — PROGRESS NOTES
Length of meeting;  55 minutes    Start Time: 0900    Stop Time: 9211    Diagnosis:  Generalized anxiety disorder. Major depressive disorder, recurrent, in partial remission. Treatment Plan: Focus on loss issues related to her mother. Will eventually focus on anxiety based on persistent worry that negatively impacts her life, using anxiety exposure and response activation treatment. Will also focus on self care, related to issues with sleep, and being attentive to her overall health. Patient Prognosis: Guarded at present time. Patient Progress: The patient indicates that she  is doing [address]; she notes that she continues to be worried about people around her dying. We reviewed the prior assignment that she had difficulty with; that was to:                Monitor and write down automatic thoughts   If negative, what would a more helpful thought be? She continues to struggle with this but as we worked through this today, one of the things that emerged is that she has a core thought that reflects her fear of being alone. She continues to struggle with being able to identify thoughts. Using the board in my office, we were able to get through to some of her core thoughts. I have asked her to repeat the assignment. Mental Status exam: No current active SI or HI. The patient continues to contract for safety. Thought processes appear normal.  Speech is goal directed. The patient does not appear to be responding to internal stimuli. Plan: I will see this patient again in 2 weeks.     BUSTER Manrique

## 2023-08-23 ENCOUNTER — HOSPITAL ENCOUNTER (OUTPATIENT)
Dept: PHYSICAL THERAPY | Age: 58
Setting detail: RECURRING SERIES
Discharge: HOME | End: 2023-08-26
Payer: COMMERCIAL

## 2023-08-23 ENCOUNTER — OFFICE VISIT (OUTPATIENT)
Dept: BEHAVIORAL/MENTAL HEALTH CLINIC | Age: 58
End: 2023-08-23
Payer: COMMERCIAL

## 2023-08-23 DIAGNOSIS — F33.41 MDD (MAJOR DEPRESSIVE DISORDER), RECURRENT, IN PARTIAL REMISSION (HCC): ICD-10-CM

## 2023-08-23 DIAGNOSIS — F41.1 GENERALIZED ANXIETY DISORDER: Primary | ICD-10-CM

## 2023-08-23 PROCEDURE — 1036F TOBACCO NON-USER: CPT | Performed by: SOCIAL WORKER

## 2023-08-23 PROCEDURE — 97112 NEUROMUSCULAR REEDUCATION: CPT

## 2023-08-23 PROCEDURE — 97110 THERAPEUTIC EXERCISES: CPT

## 2023-08-23 PROCEDURE — 90837 PSYTX W PT 60 MINUTES: CPT | Performed by: SOCIAL WORKER

## 2023-08-23 ASSESSMENT — PAIN SCALES - GENERAL: PAINLEVEL_OUTOF10: 2

## 2023-08-23 NOTE — THERAPY RECERTIFICATION
Hayden Scruggs  : 1965  Primary: [address]  Secondary:  [address] [place] [address] [address]  Phone: [phone]  Fax: [phone] Plan Frequency: 2x a week for up to 90 days    Plan of Care/Certification Expiration Date: 10/22/23      PT Visit Info: Total # of Visits to Date: 10  Progress Note Counter: 10      OUTPATIENT PHYSICAL THERAPY:OP NOTE TYPE: Recertification                Episode  Appt Desk         Treatment Diagnosis:  Pain in left hip (M25.552)  Stiffness of Left Hip, Not elsewhere classified (M25.652)  * No diagnoses found *  Medical/Referring Diagnosis:  Left hip pain [M25.552]  Referring Physician:  Tristan Baker MD Orders:  PT Eval and Treat   Return MD Appt:    Date of Onset:  No data recorded   Allergies:  Amoxicillin  Restrictions/Precautions:    No data recordedNo data recorded     Medications Last Reviewed:  2023     SUBJECTIVE   History of Injury/Illness (Reason for Referral):  Pt arrives to the [address] [place] [address] clinic with LLE hip pain. Pt reports that the pain is incessant throughout the day. Pt reports that the pain is more stiffness in nature, pt reports as the days going the LLE hip loosens up but still gets stiffness throughout the day. Pt states that the pain is in the lateral pain in the LLE hip that travels down the leg at times. Pt reports struggling with squatting and picking objects from a low surface. Pt works a sedentary job and works full time. Pt denies numbness and tingling. Patient Stated Goal(s):  \"would like to move without LLE hip pain\"  Initial:     2/10 Post Session:     1/10  Past Medical History/Comorbidities:   Ms. Scott Marquez  has a past medical history of Adverse effect of anesthesia, Anxiety, Depression, Headache, History of kidney stones, and Thyroid disease. Ms. Scott Marquez  has a past surgical history that includes heent; heent (2018);  Colonoscopy

## 2023-08-23 NOTE — PROGRESS NOTES
Ananda Scruggs  : 1965  Primary: [address]  Secondary:  [address] [place] [address]  [address] [address]  Phone: [phone]  Fax: [phone] Plan Frequency: 2x a week for up to 90 days  Plan of Care/Certification Expiration Date: 10/22/23      PT Visit Info: Total # of Visits to Date: 10  Progress Note Counter: 10      OUTPATIENT PHYSICAL THERAPY:OP NOTE TYPE: Treatment Note 2023       Episode  }Appt Desk              Treatment Diagnosis:  Pain in left hip (M25.552)  Stiffness of Left Hip, Not elsewhere classified (Z88.115)  Medical/Referring Diagnosis:  Left hip pain [M25.552]  Referring Physician:  Suresh Zuñiga MD Orders:  PT Eval and Treat   Date of Onset:  No data recorded   Allergies:   Amoxicillin  Restrictions/Precautions:  No data recordedNo data recorded     Interventions Planned (Treatment may consist of any combination of the following):    Current Treatment Recommendations: Strengthening; ROM; Balance training; Functional mobility training; Transfer training; Endurance training; Gait training; Stair training; Neuromuscular re-education; Manual; Pain management; Return to work related activity; Home exercise program; Safety education & training; Modalities; Positioning; Therapeutic activities     Subjective Comments:  pt reports minor soreness and weakness in the LLE hip complex  Initial:}    2/10Post Session:       1/10  Medications Last Reviewed:  2023  Updated Objective Findings:  See evaluation note from today  Treatment   THERAPEUTIC EXERCISE: (15 minutes):    Exercises per grid below to improve mobility, strength, balance and coordination. Required no visual, verbal, manual and tactile cues to promote proper body alignment, promote proper body posture, promote proper body mechanics and promote proper body breathing techniques.   Progressed resistance, range, repetitions and complexity of

## 2023-08-31 ENCOUNTER — HOSPITAL ENCOUNTER (OUTPATIENT)
Dept: PHYSICAL THERAPY | Age: 58
Setting detail: RECURRING SERIES
End: 2023-08-31
Payer: COMMERCIAL

## 2023-08-31 PROCEDURE — 97112 NEUROMUSCULAR REEDUCATION: CPT

## 2023-08-31 PROCEDURE — 97110 THERAPEUTIC EXERCISES: CPT

## 2023-08-31 NOTE — PROGRESS NOTES
Sofi Scruggs  : 1965  Primary: [address]  Secondary:  [address] [facility] [address] [address] [address]  Phone: [phone]  Fax: [phone] Plan Frequency: 2x a week for up to 90 days  Plan of Care/Certification Expiration Date: 10/22/23      PT Visit Info: Total # of Visits to Date: 6  Progress Note Counter: 1      OUTPATIENT PHYSICAL THERAPY:OP NOTE TYPE: Treatment Note 2023       Episode  }Appt Desk              Treatment Diagnosis:  Pain in left hip (M25.552)  Stiffness of Left Hip, Not elsewhere classified (Q39.882)  Medical/Referring Diagnosis:  Left hip pain [M25.552]  Referring Physician:  Harry Maya MD Orders:  PT Eval and Treat   Date of Onset:  No data recorded   Allergies:   Amoxicillin  Restrictions/Precautions:  No data recordedNo data recorded     Interventions Planned (Treatment may consist of any combination of the following):    Current Treatment Recommendations: Strengthening; ROM; Balance training; Functional mobility training; Transfer training; Endurance training; Gait training; Stair training; Neuromuscular re-education; Manual; Pain management; Return to work related activity; Home exercise program; Safety education & training; Modalities; Positioning; Therapeutic activities     Subjective Comments:  pt reports no pain, does report weakness though  Initial:}     /10Post Session:        /10  Medications Last Reviewed:  2023  Updated Objective Findings:  See evaluation note from today  Treatment   THERAPEUTIC EXERCISE: (15 minutes):    Exercises per grid below to improve mobility, strength, balance and coordination. Required no visual, verbal, manual and tactile cues to promote proper body alignment, promote proper body posture, promote proper body mechanics and promote proper body breathing techniques.   Progressed resistance, range, repetitions and complexity of movement as

## 2023-09-05 NOTE — PROGRESS NOTES
Length of meeting;  47 minutes    Start Time: 0908    Stop Time: 1000    Diagnosis:  Generalized anxiety disorder. Major depressive disorder, recurrent, in partial remission. Treatment Plan: Focus on loss issues related to her mother. Will eventually focus on anxiety based on persistent worry that negatively impacts her life, using anxiety exposure and response activation treatment. Will also focus on self care, related to issues with sleep, and being attentive to her overall health. Patient Prognosis: Guarded at present time. Patient Progress: The patient indicates that she  is \"good for real\". She notes that she thinks that she has learned that she is \"reactive to what other people are doing through\". She notes that she has had little to no anxiety over the past few weeks. We reviewed the prior assignment that she had difficulty with; that was to:                Monitor and write down automatic thoughts   If negative, what would a more helpful thought be? She did not do this again. We talked about whether she wants to engage in this stepped process of treating her anxiety. She would like to think about this and answer this next time that we meet. Mental Status exam: JAZMIN-7 is a 2. No current active SI or HI. The patient continues to contract for safety. Thought processes appear normal.  Speech is goal directed. The patient does not appear to be responding to internal stimuli. Plan: I will see this patient again in about 4 weeks.     BUSTER Dubois

## 2023-09-06 ENCOUNTER — OFFICE VISIT (OUTPATIENT)
Dept: BEHAVIORAL/MENTAL HEALTH CLINIC | Age: 58
End: 2023-09-06
Payer: COMMERCIAL

## 2023-09-06 DIAGNOSIS — F33.41 MDD (MAJOR DEPRESSIVE DISORDER), RECURRENT, IN PARTIAL REMISSION (HCC): ICD-10-CM

## 2023-09-06 DIAGNOSIS — F41.1 GENERALIZED ANXIETY DISORDER: Primary | ICD-10-CM

## 2023-09-06 PROCEDURE — 90834 PSYTX W PT 45 MINUTES: CPT | Performed by: SOCIAL WORKER

## 2023-09-06 PROCEDURE — 1036F TOBACCO NON-USER: CPT | Performed by: SOCIAL WORKER

## 2023-09-06 ASSESSMENT — ANXIETY QUESTIONNAIRES
5. BEING SO RESTLESS THAT IT IS HARD TO SIT STILL: 0
4. TROUBLE RELAXING: 0
3. WORRYING TOO MUCH ABOUT DIFFERENT THINGS: 0
7. FEELING AFRAID AS IF SOMETHING AWFUL MIGHT HAPPEN: 0
GAD7 TOTAL SCORE: 2
6. BECOMING EASILY ANNOYED OR IRRITABLE: 1
IF YOU CHECKED OFF ANY PROBLEMS ON THIS QUESTIONNAIRE, HOW DIFFICULT HAVE THESE PROBLEMS MADE IT FOR YOU TO DO YOUR WORK, TAKE CARE OF THINGS AT HOME, OR GET ALONG WITH OTHER PEOPLE: NOT DIFFICULT AT ALL
2. NOT BEING ABLE TO STOP OR CONTROL WORRYING: 1
1. FEELING NERVOUS, ANXIOUS, OR ON EDGE: 0

## 2023-09-07 ENCOUNTER — HOSPITAL ENCOUNTER (OUTPATIENT)
Dept: PHYSICAL THERAPY | Age: 58
Setting detail: RECURRING SERIES
Discharge: HOME | End: 2023-09-10
Payer: COMMERCIAL

## 2023-09-07 PROCEDURE — 97112 NEUROMUSCULAR REEDUCATION: CPT

## 2023-09-07 PROCEDURE — 97110 THERAPEUTIC EXERCISES: CPT

## 2023-09-07 NOTE — PROGRESS NOTES
Navin Scruggs  : 1965  Primary: [address]  Secondary:  [address] [place] [facility] [address]  [address] [address]  Phone: [phone]  Fax: [phone] Plan Frequency: 2x a week for up to 90 days  Plan of Care/Certification Expiration Date: 10/22/23      PT Visit Info: Total # of Visits to Date: 15  Progress Note Counter: 2      OUTPATIENT PHYSICAL THERAPY:OP NOTE TYPE: Treatment Note 2023       Episode  }Appt Desk              Treatment Diagnosis:  Pain in left hip (M25.552)  Stiffness of Left Hip, Not elsewhere classified (Q80.440)  Medical/Referring Diagnosis:  Left hip pain [M25.552]  Referring Physician:  Opal Krishna MD Orders:  PT Eval and Treat   Date of Onset:  No data recorded   Allergies:   Amoxicillin  Restrictions/Precautions:  No data recordedNo data recorded     Interventions Planned (Treatment may consist of any combination of the following):    Current Treatment Recommendations: Strengthening; ROM; Balance training; Functional mobility training; Transfer training; Endurance training; Gait training; Stair training; Neuromuscular re-education; Manual; Pain management; Return to work related activity; Home exercise program; Safety education & training; Modalities; Positioning; Therapeutic activities     Subjective Comments:  pt reports improvement in the LLE hip  Initial:}     /10Post Session:        /10  Medications Last Reviewed:  2023  Updated Objective Findings:  See evaluation note from today  Treatment   THERAPEUTIC EXERCISE: (15 minutes):    Exercises per grid below to improve mobility, strength, balance and coordination. Required no visual, verbal, manual and tactile cues to promote proper body alignment, promote proper body posture, promote proper body mechanics and promote proper body breathing techniques.   Progressed resistance, range, repetitions and complexity of movement as

## 2023-09-14 ENCOUNTER — HOSPITAL ENCOUNTER (OUTPATIENT)
Dept: PHYSICAL THERAPY | Age: 58
Setting detail: RECURRING SERIES
Discharge: HOME | End: 2023-09-17
Payer: COMMERCIAL

## 2023-09-14 PROCEDURE — 97112 NEUROMUSCULAR REEDUCATION: CPT

## 2023-09-14 PROCEDURE — 97110 THERAPEUTIC EXERCISES: CPT

## 2023-09-14 ASSESSMENT — PAIN SCALES - GENERAL: PAINLEVEL_OUTOF10: 5

## 2023-09-14 NOTE — PROGRESS NOTES
Keven Marco Antonio Sheba  : 1965  Primary: [address]  Secondary:  [address] [facility]  [address] [address]  Phone: [phone]  Fax: [phone] Plan Frequency: 2x a week for up to 90 days  Plan of Care/Certification Expiration Date: 10/22/23      PT Visit Info: Total # of Visits to Date: 15  Progress Note Counter: 3      OUTPATIENT PHYSICAL THERAPY:OP NOTE TYPE: Treatment Note 2023       Episode  }Appt Desk              Treatment Diagnosis:  Pain in left hip (M25.552)  Stiffness of Left Hip, Not elsewhere classified (C06.243)  Medical/Referring Diagnosis:  Left hip pain [M25.552]  Referring Physician:  Alaina Longoria MD Orders:  PT Eval and Treat   Date of Onset:  No data recorded   Allergies:   Amoxicillin  Restrictions/Precautions:  No data recordedNo data recorded     Interventions Planned (Treatment may consist of any combination of the following):    Current Treatment Recommendations: Strengthening; ROM; Balance training; Functional mobility training; Transfer training; Endurance training; Gait training; Stair training; Neuromuscular re-education; Manual; Pain management; Return to work related activity; Home exercise program; Safety education & training; Modalities; Positioning; Therapeutic activities     Subjective Comments:  pt reports improvement but does have lateral LLE hip tightness  Initial:}    5/10Post Session:       4/10  Medications Last Reviewed:  2023  Updated Objective Findings:  See evaluation note from today  Treatment   THERAPEUTIC EXERCISE: (15 minutes):    Exercises per grid below to improve mobility, strength, balance and coordination. Required no visual, verbal, manual and tactile cues to promote proper body alignment, promote proper body posture, promote proper body mechanics and promote proper body breathing techniques.   Progressed resistance, range, repetitions and complexity of

## 2023-09-20 ENCOUNTER — HOSPITAL ENCOUNTER (OUTPATIENT)
Dept: PHYSICAL THERAPY | Age: 58
Setting detail: RECURRING SERIES
Discharge: HOME | End: 2023-09-23
Payer: COMMERCIAL

## 2023-09-20 DIAGNOSIS — G47.9 SLEEP DISORDER: ICD-10-CM

## 2023-09-20 DIAGNOSIS — E66.09 CLASS 2 OBESITY DUE TO EXCESS CALORIES WITHOUT SERIOUS COMORBIDITY WITH BODY MASS INDEX (BMI) OF 38.0 TO 38.9 IN ADULT: ICD-10-CM

## 2023-09-20 DIAGNOSIS — R06.83 SNORING: ICD-10-CM

## 2023-09-20 DIAGNOSIS — J45.909 ASTHMA DUE TO ENVIRONMENTAL ALLERGIES: Primary | ICD-10-CM

## 2023-09-20 PROCEDURE — 97112 NEUROMUSCULAR REEDUCATION: CPT

## 2023-09-20 PROCEDURE — 97110 THERAPEUTIC EXERCISES: CPT

## 2023-09-20 ASSESSMENT — PAIN SCALES - GENERAL: PAINLEVEL_OUTOF10: 4

## 2023-09-20 NOTE — PROGRESS NOTES
Jackie Scruggs  : 1965  Primary: [address]  Secondary:  [address] [facility] [address] [address] [address]  Phone: [phone]  Fax: [phone] Plan Frequency: 2x a week for up to 90 days  Plan of Care/Certification Expiration Date: 10/22/23      PT Visit Info: Total # of Visits to Date: 14  Progress Note Counter: 4      OUTPATIENT PHYSICAL THERAPY:OP NOTE TYPE: Treatment Note 2023       Episode  }Appt Desk              Treatment Diagnosis:  Pain in left hip (M25.552)  Stiffness of Left Hip, Not elsewhere classified (T58.259)  Medical/Referring Diagnosis:  Left hip pain [M25.552]  Referring Physician:  Leonor Ingram MD Orders:  PT Eval and Treat   Date of Onset:  No data recorded   Allergies:   Amoxicillin  Restrictions/Precautions:  No data recordedNo data recorded     Interventions Planned (Treatment may consist of any combination of the following):    Current Treatment Recommendations: Strengthening; ROM; Balance training; Functional mobility training; Transfer training; Endurance training; Gait training; Stair training; Neuromuscular re-education; Manual; Pain management; Return to work related activity; Home exercise program; Safety education & training; Modalities; Positioning; Therapeutic activities     Subjective Comments:  pt reports improvement in the LLE hip  Initial:}    4/10Post Session:       3/10  Medications Last Reviewed:  2023  Updated Objective Findings:  See evaluation note from today  Treatment   THERAPEUTIC EXERCISE: (15 minutes):    Exercises per grid below to improve mobility, strength, balance and coordination. Required no visual, verbal, manual and tactile cues to promote proper body alignment, promote proper body posture, promote proper body mechanics and promote proper body breathing techniques.   Progressed resistance, range, repetitions and complexity of movement as

## 2023-09-28 ENCOUNTER — HOSPITAL ENCOUNTER (OUTPATIENT)
Dept: PHYSICAL THERAPY | Age: 58
Setting detail: RECURRING SERIES
End: 2023-09-28
Payer: COMMERCIAL

## 2023-09-28 PROCEDURE — 97110 THERAPEUTIC EXERCISES: CPT

## 2023-09-28 PROCEDURE — 97112 NEUROMUSCULAR REEDUCATION: CPT

## 2023-09-28 ASSESSMENT — PAIN SCALES - GENERAL: PAINLEVEL_OUTOF10: 3

## 2023-09-28 NOTE — PROGRESS NOTES
Stacey Scruggs  : 1965  Primary: [address]  Secondary:  [address] [place] [address] [address]  Phone: [phone]  Fax: [phone] Plan Frequency: 2x a week for up to 90 days  Plan of Care/Certification Expiration Date: 10/22/23      PT Visit Info: Total # of Visits to Date: 15  Progress Note Counter: 5      OUTPATIENT PHYSICAL THERAPY:OP NOTE TYPE: Treatment Note 2023       Episode  }Appt Desk              Treatment Diagnosis:  Pain in left hip (M25.552)  Stiffness of Left Hip, Not elsewhere classified (U85.202)  Medical/Referring Diagnosis:  Left hip pain [M25.552]  Referring Physician:  Kris Mccloud MD Orders:  PT Eval and Treat   Date of Onset:  No data recorded   Allergies:   Amoxicillin  Restrictions/Precautions:  No data recordedNo data recorded     Interventions Planned (Treatment may consist of any combination of the following):    Current Treatment Recommendations: Strengthening; ROM; Balance training; Functional mobility training; Transfer training; Endurance training; Gait training; Stair training; Neuromuscular re-education; Manual; Pain management; Return to work related activity; Home exercise program; Safety education & training; Modalities; Positioning; Therapeutic activities     Subjective Comments:  pt reports some minor soreness in the LLE hip making progress  Initial:}    3/10Post Session:       2/10  Medications Last Reviewed:  2023  Updated Objective Findings:  See evaluation note from today  Treatment   THERAPEUTIC EXERCISE: (15 minutes):    Exercises per grid below to improve mobility, strength, balance and coordination. Required no visual, verbal, manual and tactile cues to promote proper body alignment, promote proper body posture, promote proper body mechanics and promote proper body breathing techniques.   Progressed resistance, range, repetitions and complexity of

## 2023-10-09 ENCOUNTER — HOSPITAL ENCOUNTER (OUTPATIENT)
Dept: PHYSICAL THERAPY | Age: 58
Setting detail: RECURRING SERIES
Discharge: HOME | End: 2023-10-12
Payer: COMMERCIAL

## 2023-10-09 PROCEDURE — 97110 THERAPEUTIC EXERCISES: CPT

## 2023-10-09 PROCEDURE — 97112 NEUROMUSCULAR REEDUCATION: CPT

## 2023-10-09 ASSESSMENT — PAIN SCALES - GENERAL: PAINLEVEL_OUTOF10: 2

## 2023-10-09 NOTE — PROGRESS NOTES
Chief complaint:   Chief Complaint   Patient presents with   • Sinus Problem       Vitals:  Visit Vitals  /70   Pulse 80   Temp 98.2 °F (36.8 °C) (Tympanic)   LMP 02/19/2022 (Exact Date)       HISTORY OF PRESENT ILLNESS      38-year-old female presents to the urgent care for nasal congestion.  Patient reports history of allergies; She states 3 days ago she delveoped rhinitis, clear nasal congestion and clogged ears. Symptoms were mediated with Ibuprofen, Zyrtec and Flonase however medications are no longer effective. Patient reports increasing frontal pressure, ear pressure and jaw pain. Today patient began experiencing lightheadedness, like disequilibrium, worse with movement. Denies spinning, hearing loss or tinnitus. She denies fevers, chills, but reports myalgias, fatigue. She denies numbness, weakness, syncope. Denies difficulty opening mouth.      Other significant problems:  Patient Active Problem List    Diagnosis Date Noted   • Low grade squamous intraepithelial lesion (LGSIL) on cervical Pap smear 05/13/2021     Priority: Low   • History of major depression 02/24/2020     Priority: Low       PAST MEDICAL, FAMILY AND SOCIAL HISTORY     Medications:  Current Outpatient Medications   Medication Sig Dispense Refill   • predniSONE (DELTASONE) 20 MG tablet Take 2 tablets by mouth daily for 5 days. 10 tablet 0   • meclizine (ANTIVERT) 12.5 MG tablet Take 1 tablet by mouth 3 times daily as needed for Dizziness. 15 tablet 1   • escitalopram (Lexapro) 20 MG tablet Take 1 tablet by mouth daily. 90 tablet 3     No current facility-administered medications for this visit.       Allergies:  ALLERGIES:  No Known Allergies    Past Medical  History/Surgeries:  No past medical history on file.    Past Surgical History:   Procedure Laterality Date   • [place] cerv inc/vag w/end curett  09/13/2019    MICHAEL I on Pap   • Colposcopy bx cervix endocerv curr N/A 09/01/2021    MICHAEL I on Pap       Family History:  No family history  Margie Scruggs  : 1965  [address]: [address]  Secondary:  [address] [place] [address] [address]  Phone: [phone]  Fax: [phone] Plan Frequency: 2x a week for up to 90 days  Plan of Care/Certification Expiration Date: 10/22/23      PT Visit Info: Total # of Visits to Date: 12  Progress Note Counter: 6      OUTPATIENT PHYSICAL THERAPY:OP NOTE TYPE: Treatment Note 10/9/2023       Episode  }Appt Desk              Treatment Diagnosis:  Pain in left hip (M25.552)  Stiffness of Left Hip, Not elsewhere classified (U23.427)  Medical/Referring Diagnosis:  Left hip pain [M25.552]  Referring Physician:  Onel Amezcua MD Orders:  PT Eval and Treat   Date of Onset:  No data recorded   Allergies:   Amoxicillin  Restrictions/Precautions:  No data recordedNo data recorded     Interventions Planned (Treatment may consist of any combination of the following):    Current Treatment Recommendations: Strengthening; ROM; Balance training; Functional mobility training; Transfer training; Endurance training; Gait training; Stair training; Neuromuscular re-education; Manual; Pain management; Return to work related activity; Home exercise program; Safety education & training; Modalities; Positioning; Therapeutic activities     Subjective Comments:  pt reports some soreness in the LLE hip but feels better  Initial:}    2/10Post Session:       1/10  Medications Last Reviewed:  10/9/2023  Updated Objective Findings:  See evaluation note from today  Treatment   THERAPEUTIC EXERCISE: (15 minutes):    Exercises per grid below to improve mobility, strength, balance and coordination. Required no visual, verbal, manual and tactile cues to promote proper body alignment, promote proper body posture, promote proper body mechanics and promote proper body breathing techniques.   Progressed resistance, range, repetitions and complexity of movement on file.    Social History:  Social History     Tobacco Use   • Smoking status: Never Smoker   • Smokeless tobacco: Never Used   Substance Use Topics   • Alcohol use: Yes     Alcohol/week: 0.0 standard drinks     Comment: social/rare       REVIEW OF SYSTEMS     Review of Systems   Constitutional: Positive for fatigue. Negative for appetite change, chills, diaphoresis and fever.   HENT: Positive for congestion, ear pain, postnasal drip, rhinorrhea and sore throat. Negative for ear discharge, hearing loss, sinus pressure, tinnitus and trouble swallowing.    Eyes: Negative for discharge and redness.   Respiratory: Negative for cough, choking, chest tightness, shortness of breath and wheezing.    Cardiovascular: Negative for chest pain.   Gastrointestinal: Negative for abdominal pain, diarrhea and vomiting.   Genitourinary: Negative for dysuria.   Musculoskeletal: Positive for myalgias.   Skin: Negative for rash.   Neurological: Positive for dizziness, light-headedness and headaches. Negative for syncope, weakness and numbness.       PHYSICAL EXAM     Physical Exam  Vitals and nursing note reviewed.   Constitutional:       General: She is not in acute distress.     Appearance: She is well-developed. She is ill-appearing (mildly). She is not diaphoretic.   HENT:      Right Ear: Tympanic membrane, ear canal and external ear normal.      Left Ear: Tympanic membrane, ear canal and external ear normal.      Nose: Mucosal edema, congestion and rhinorrhea present. Rhinorrhea is clear.      Right Sinus: Frontal sinus tenderness present. No maxillary sinus tenderness.      Left Sinus: Frontal sinus tenderness present. No maxillary sinus tenderness.      Mouth/Throat:      Lips: Pink.      Mouth: Mucous membranes are moist.      Pharynx: Oropharynx is clear. Uvula midline. Posterior oropharyngeal erythema (slight) present. No pharyngeal swelling, oropharyngeal exudate or uvula swelling.      Neck: Neck supple.   Cardiovascular:       Rate and Rhythm: Normal rate and regular rhythm.      Pulses: Normal pulses.      Heart sounds: Normal heart sounds, S1 normal and S2 normal.   Pulmonary:      Effort: Pulmonary effort is normal.      Breath sounds: Normal breath sounds and air entry. No decreased breath sounds, wheezing, rhonchi or rales.   Lymphadenopathy:      Cervical: Cervical adenopathy present.      Right cervical: No superficial or posterior cervical adenopathy.     Left cervical: No superficial or posterior cervical adenopathy.   Skin:     General: Skin is warm and dry.      Coloration: Skin is not pale.   Neurological:      General: No focal deficit present.      Mental Status: She is alert and oriented to person, place, and time.      GCS: GCS eye subscore is 4. GCS verbal subscore is 5. GCS motor subscore is 6.      Cranial Nerves: Cranial nerves 2-12 are intact. No cranial nerve deficit.      Sensory: Sensation is intact. No sensory deficit.      Motor: No weakness.      Coordination: Coordination is intact. Romberg sign negative.      Gait: Gait is intact. Gait normal.      Comments: Mildly positive Redd Ross pike test     Psychiatric:         Mood and Affect: Mood normal.         Behavior: Behavior normal.         ASSESSMENT/PLAN     Urgent care course:  COVID negative   Influenza negative    Kalpana was seen today for sinus problem.    Diagnoses and all orders for this visit:    Sinus congestion  -     POCT SARS-COV-2 ANTIGEN  -     POCT INFLUENZA A/B  -     predniSONE (DELTASONE) 20 MG tablet; Take 2 tablets by mouth daily for 5 days.    Dizziness  -     predniSONE (DELTASONE) 20 MG tablet; Take 2 tablets by mouth daily for 5 days.    Vertigo  -     meclizine (ANTIVERT) 12.5 MG tablet; Take 1 tablet by mouth 3 times daily as needed for Dizziness.      Patient appears well on exam with stable vital signs.  She is afebrile, nontoxic or hypoxic.  Physical exam findings consistent with viral versus allergic URI and congestion.  No  focal neurological deficits noted on exam.  She has negative Romberg sign.  She does have mildly positive Clayton-Hallpike test and symptoms of vertigo.    Begin prednisone 20 mg, take 2 tablets daily for 5 days.  Please take prednisone earlier in the day to avoid insomnia.  While you are taking prednisone please avoid other anti-inflammatories such as Motrin or Aleve.     Discontinue cetirizine and instead start meclizine 3 times daily as needed for dizziness and allergies    Continue Flonase two times daily.      Go to the Emergency Department right away for any of the warning symptoms listed above, or for difficulty breathing or swallowing, shortness of breath, dizziness or lightheadedness, chest or abdominal pain, chest heaviness or tightness, nausea, vomiting or diarrhea, pain with mouth opening, or if any current symptoms worsen, or new symptoms develop.     Follow up with your PCP in the next 4-5 days if any symptoms remain.    Side effects of medications reviewed.  Red flags and precautions to head to the ED if signs and symptoms are worsening or persist discussed with patient who verbalized understanding and agrees with plan.  Home care instructions reviewed with the patient, see AVS.    Supervising Physician:   Francisco Michael

## 2023-10-16 ENCOUNTER — OFFICE VISIT (OUTPATIENT)
Dept: BEHAVIORAL/MENTAL HEALTH CLINIC | Age: 58
End: 2023-10-16

## 2023-10-16 ENCOUNTER — OFFICE VISIT (OUTPATIENT)
Dept: BEHAVIORAL/MENTAL HEALTH CLINIC | Age: 58
End: 2023-10-16
Payer: COMMERCIAL

## 2023-10-16 VITALS
OXYGEN SATURATION: 97 % | HEIGHT: 62 IN | WEIGHT: 210 LBS | HEART RATE: 84 BPM | SYSTOLIC BLOOD PRESSURE: 112 MMHG | BODY MASS INDEX: 38.64 KG/M2 | DIASTOLIC BLOOD PRESSURE: 68 MMHG

## 2023-10-16 DIAGNOSIS — F33.42 MDD (MAJOR DEPRESSIVE DISORDER), RECURRENT, IN FULL REMISSION (HCC): Primary | ICD-10-CM

## 2023-10-16 DIAGNOSIS — F33.42 MDD (MAJOR DEPRESSIVE DISORDER), RECURRENT, IN FULL REMISSION (HCC): ICD-10-CM

## 2023-10-16 DIAGNOSIS — F41.1 GENERALIZED ANXIETY DISORDER: ICD-10-CM

## 2023-10-16 PROCEDURE — 1036F TOBACCO NON-USER: CPT | Performed by: STUDENT IN AN ORGANIZED HEALTH CARE EDUCATION/TRAINING PROGRAM

## 2023-10-16 PROCEDURE — 99213 OFFICE O/P EST LOW 20 MIN: CPT | Performed by: STUDENT IN AN ORGANIZED HEALTH CARE EDUCATION/TRAINING PROGRAM

## 2023-10-16 PROCEDURE — G8427 DOCREV CUR MEDS BY ELIG CLIN: HCPCS | Performed by: STUDENT IN AN ORGANIZED HEALTH CARE EDUCATION/TRAINING PROGRAM

## 2023-10-16 PROCEDURE — G8417 CALC BMI ABV UP PARAM F/U: HCPCS | Performed by: STUDENT IN AN ORGANIZED HEALTH CARE EDUCATION/TRAINING PROGRAM

## 2023-10-16 PROCEDURE — G8484 FLU IMMUNIZE NO ADMIN: HCPCS | Performed by: STUDENT IN AN ORGANIZED HEALTH CARE EDUCATION/TRAINING PROGRAM

## 2023-10-16 PROCEDURE — 3017F COLORECTAL CA SCREEN DOC REV: CPT | Performed by: STUDENT IN AN ORGANIZED HEALTH CARE EDUCATION/TRAINING PROGRAM

## 2023-10-16 RX ORDER — VILAZODONE HYDROCHLORIDE 40 MG/1
40 TABLET ORAL DAILY
Qty: 90 TABLET | Refills: 1 | Status: SHIPPED | OUTPATIENT
Start: 2023-10-16 | End: 2023-10-16 | Stop reason: SDUPTHER

## 2023-10-16 RX ORDER — VILAZODONE HYDROCHLORIDE 40 MG/1
40 TABLET ORAL DAILY
Qty: 90 TABLET | Refills: 1 | Status: SHIPPED | OUTPATIENT
Start: 2023-10-16 | End: 2024-04-13

## 2023-10-16 NOTE — PROGRESS NOTES
[address] [address]      Patient Name: [facility]    Patient : 1965    Patient MRN: [id number]    Insurance: [place] [facility])    Primary Language: English      Date of Service: 10/16/2023    Type of Service: Medication management    Other Services Involved: N/A       Phone Number: [phone]   Emergency Contact: Delbert Salazar, , [phone]       Chief Complaint: \"My aunt had a stroke in May\"         History of Present Illness    Andrez Campos is a 66 y.o. female with reported prior psychiatric history significant for recurrent depression who presents for medication management. They are currently prescribed: Viibryd 40 mg daily. Pt reports that she has been doing well. Remains engaged with [facility], but has been struggling with anxiety assignment. Expresses often her triggers revolve around fear of loss. Reports compliance and denies significant SE. Denies SI/HI, A/VH, paranoia or delusions. Last Visit (23): Pt reports that her aunt had a stroke in May, but she has been doing well at home. She was released home with home health/home PT, but has had several medical complications since returning home. Remains engaged with [employer]. Reports compliance and denies significant SE. Denies SI/HI, A/VH, paranoia or delusions. Psychiatric Review of Systems    Depression: depressed mood, anhedonia, low energy, insomnia, and feelings of worthlessness or excessive guilt. Expresses that she \"came out of the womb depressed,\" and reports that it was never addressed by her parents. Does not believe that she was able to fully address it herself until she moved to [place] about 17 yrs ago. Believes that she would be able to get into interests again \"if I could get started. \" Rates her depression as a 7/10. Denies SI/HI. Anxiety: excessive anxiety and worry. Denies difficulty controlling anxiety or significant impairment.  One prior severe PA, but denies

## 2023-10-18 ENCOUNTER — HOSPITAL ENCOUNTER (OUTPATIENT)
Dept: PHYSICAL THERAPY | Age: 58
Setting detail: RECURRING SERIES
Discharge: HOME | End: 2023-10-21
Payer: COMMERCIAL

## 2023-10-18 PROCEDURE — 97110 THERAPEUTIC EXERCISES: CPT

## 2023-10-18 PROCEDURE — 97112 NEUROMUSCULAR REEDUCATION: CPT

## 2023-10-18 ASSESSMENT — PAIN SCALES - GENERAL: PAINLEVEL_OUTOF10: 1

## 2023-10-18 NOTE — THERAPY RECERTIFICATION
(2017); trabeculectomy (2011); fragment kidney stone/ eswl; Breast reduction surgery (1999); and Dilation and curettage of uterus (10/2017). Social History/Living Environment:   Lives With: Spouse  Type of Home: House       Prior Level of Function/Work/Activity:   Prior level of function: Independent  No data recordedNo data recorded     Learning:   Does the patient/guardian have any barriers to learning?: No barriers       Fall Risk Scale: Larose Total Score: 0  Larose Fall Risk: Low (0-24)             OBJECTIVE     NATURE OF CONDITION Date: 6/14/23   Date: 8/23/23   Date: 10/18/23   Highest level of pain 6 5 3   Lowest level of pain 4 3 2   Aggravating factors WB WB WB   Alleviating factors rest Rest  Rest    Frequency of symptoms Everyday  Intermittent  Intermittent    Description of symptoms Dull ache/stiffness Dull ache  Deep dull ache/soreness    Location of tenderness Lateral LLE hip Lateral LLE hip Lateral LLE hip      RANGE OF MOTION Date: 6/14/23 Date: 8/23/23     RIGHT LEFT RIGHT LEFT   Knee Extension WNL WNL     Knee Flexion WNL WNL     Ankle Dorsiflexion WNL WNL     Ankle Plantarflexion WNL WNL            Knee Extension (PROM) WNL WNL     Knee Flexion (PROM) WNL WNL       STRENGTH Date: 6/14/23 Date: 8/23/23 Date: 10/18/23       RIGHT LEFT RIGHT LEFT RIGHT LEFT   Hip Flexion 4-/5 4-/5 4+/5 4+/5 WNL WNL   Hip Extension 4-/5 3-/5 ** 4+/5 3+/5 WNL 4-/5   Hip Abduction 4-/5 3-/5 ** 4+/5 3+/5 WNL 4-/5   Knee Extension 4-/5 4-/5 4+/5 4+/5 WNL WNL   Knee Flexion 4-/5 4-/5 4+/5 4+/5 WNL WNL   Ankle Dorsiflexion 4-/5 4-/5 4+/5 4+/5 WNL WNL   Ankle Plantarflexion 4-/5 3-/5 4+/5 4+/5 WNL WNL     ** MMT Strong & Painful    NEUROLOGICAL SCREEN  Dermatomes:  WNL BLE  Reflexes: WNL BLE    MISCELLANEOUS  Appearance: clean, dry, & intact     SPECIAL TESTS   90/90:negative   Scour: positive  SARAH:positive  FADDIR: negative  Da's:positive   Hip ER sign: positive  Cassius Test:negative   SLS step down test:

## 2023-10-18 NOTE — PROGRESS NOTES
Josey Scruggs  : 1965  Primary: [address]  Secondary:  [address] [facility]  [address] [address]  Phone: [phone]  Fax: [phone] Plan Frequency: 2x a week for up to 90 days  Plan of Care/Certification Expiration Date: 23      PT Visit Info: Total # of Visits to Date: 17  Progress Note Counter: 7      OUTPATIENT PHYSICAL THERAPY:OP NOTE TYPE: Treatment Note 10/18/2023       Episode  }Appt Desk              Treatment Diagnosis:  Pain in left hip (M25.552)  Stiffness of Left Hip, Not elsewhere classified (F15.905)  Medical/Referring Diagnosis:  Left hip pain [M25.552]  Referring Physician:  Ana Noel MD Orders:  PT Eval and Treat   Date of Onset:  No data recorded   Allergies:   Amoxicillin  Restrictions/Precautions:  No data recordedNo data recorded     Interventions Planned (Treatment may consist of any combination of the following):    Current Treatment Recommendations: Strengthening; ROM; Balance training; Functional mobility training; Transfer training; Endurance training; Gait training; Stair training; Neuromuscular re-education; Manual; Pain management; Return to work related activity; Home exercise program; Safety education & training; Modalities; Positioning; Therapeutic activities     Subjective Comments:  pt reports with LLE hip soreness and weakness  Initial:}    1/10Post Session:       1/10  Medications Last Reviewed:  10/18/2023  Updated Objective Findings:  See evaluation note from today  Treatment   THERAPEUTIC EXERCISE: (23 minutes):    Exercises per grid below to improve mobility, strength, balance and coordination. Required no visual, verbal, manual and tactile cues to promote proper body alignment, promote proper body posture, promote proper body mechanics and promote proper body breathing techniques.   Progressed resistance, range, repetitions and complexity of movement as

## 2023-10-25 ENCOUNTER — APPOINTMENT (OUTPATIENT)
Dept: PHYSICAL THERAPY | Age: 58
End: 2023-10-25
Payer: COMMERCIAL

## 2023-10-26 ENCOUNTER — HOSPITAL ENCOUNTER (OUTPATIENT)
Dept: PHYSICAL THERAPY | Age: 58
Setting detail: RECURRING SERIES
Discharge: HOME | End: 2023-10-29
Payer: COMMERCIAL

## 2023-10-26 PROCEDURE — 97110 THERAPEUTIC EXERCISES: CPT

## 2023-10-26 PROCEDURE — 97112 NEUROMUSCULAR REEDUCATION: CPT

## 2023-10-26 ASSESSMENT — PAIN SCALES - GENERAL: PAINLEVEL_OUTOF10: 1

## 2023-10-26 NOTE — PROGRESS NOTES
contralateral glute med activation, ipsilateral ABD-ER  X 40 BTB BLE ABD  X 40 BTB BLE EXT X 45 BTB BLE ABD  X 45 BTB BLE EXT X 45 BTB BLE ABD  X 45 BTB BLE EXT X 45 BTB BLE ABD  X 45 BTB BLE EXT   Standing glute med recruitment cues for BLE ABD/ER activation 2 x 15 ea BLE 2 x 15 ea BLE 2 x 15 ea BLE 2 x 15 ea BLE   Monster walks        Sidelying side plank + neutral ABD holds, cues for QL activation, glute med activation, deep core activation, oblique activation            Treatment/Session Summary:    Treatment Assessment:  Pt tolerated tx well. Pt reports less LLE hip pain. Pt is showing strength gains with LLE hip ER and EXT strength. Will progress  Communication/Consultation:  None today  Equipment provided today:  None  Recommendations/Intent for next treatment session: Next visit will focus on BLE ER strength, ABD strength, SLS stability and ART to TFL/ABD/ER of the LLE hip.     Total Treatment Billable Duration:  40 minutes    Time In: 1604  Time Out: [address], PT       Charge Capture  }Post Session Pain  MedBridge Portal  MD Guidelines  Scanned Media  Benefits  [employer]    Future Appointments   Date Time Provider [address] [place] [address]   10/30/2023 11:00 AM Comfort Maya [facility]   11/2/2023 10:15 AM Comfort Maya PT [facility]   11/28/2023  9:00 AM PV[facility]   12[facility]4/2023  [facility]   12/5/2023  8:00 AM Pattie Bailey PA [facility] AMB   1/16/2024  8:00 [facility]   3/11/2024  8:00 AM [facility]

## 2023-10-30 ENCOUNTER — HOSPITAL ENCOUNTER (OUTPATIENT)
Dept: PHYSICAL THERAPY | Age: 58
Setting detail: RECURRING SERIES
Discharge: HOME | End: 2023-11-02
Payer: COMMERCIAL

## 2023-10-30 PROCEDURE — 97112 NEUROMUSCULAR REEDUCATION: CPT

## 2023-10-30 PROCEDURE — 97110 THERAPEUTIC EXERCISES: CPT

## 2023-10-30 NOTE — PROGRESS NOTES
Kary Scruggs  : 1965  Primary: [address]  Secondary:  [address] [facility] [address] [address]  Phone: [phone]  Fax: [phone] Plan Frequency: 2x a week for up to 90 days  Plan of Care/Certification Expiration Date: 23      PT Visit Info: Total # of Visits to Date: 23  Progress Note Counter: 9      OUTPATIENT PHYSICAL THERAPY:OP NOTE TYPE: Treatment Note 10/30/2023       Episode  }Appt Desk              Treatment Diagnosis:  Pain in left hip (M25.552)  Stiffness of Left Hip, Not elsewhere classified (K71.273)  Medical/Referring Diagnosis:  Left hip pain [M25.552]  Referring Physician:  Hoa Hargrove MD Orders:  PT Eval and Treat   Date of Onset:  No data recorded   Allergies:   Amoxicillin  Restrictions/Precautions:  No data recordedNo data recorded     Interventions Planned (Treatment may consist of any combination of the following):    Current Treatment Recommendations: Strengthening; ROM; Balance training; Functional mobility training; Transfer training; Endurance training; Gait training; Stair training; Neuromuscular re-education; Manual; Pain management; Return to work related activity; Home exercise program; Safety education & training; Modalities; Positioning; Therapeutic activities     Subjective Comments:  pt reports improvement in the LLE hip. Still reports minor soreness at times  Initial:}     /10Post Session:        /10  Medications Last Reviewed:  10/30/2023  Updated Objective Findings:  See evaluation note from today  Treatment   THERAPEUTIC EXERCISE: (15 minutes):    Exercises per grid below to improve mobility, strength, balance and coordination. Required no visual, verbal, manual and tactile cues to promote proper body alignment, promote proper body posture, promote proper body mechanics and promote proper body breathing techniques.   Progressed resistance, range, repetitions and

## 2023-11-02 ENCOUNTER — HOSPITAL ENCOUNTER (OUTPATIENT)
Dept: PHYSICAL THERAPY | Age: 58
Setting detail: RECURRING SERIES
Discharge: HOME | End: 2023-11-05
Payer: COMMERCIAL

## 2023-11-02 PROCEDURE — 97110 THERAPEUTIC EXERCISES: CPT

## 2023-11-02 PROCEDURE — 97112 NEUROMUSCULAR REEDUCATION: CPT

## 2023-11-02 ASSESSMENT — PAIN SCALES - GENERAL: PAINLEVEL_OUTOF10: 1

## 2023-11-02 NOTE — PROGRESS NOTES
Tavon Scruggs  : 1965  Primary: [address]  Secondary:  [address] [facility]  [facility] [address]  Phone: [phone]  Fax: [phone] Plan Frequency: 2x a week for up to 90 days  Plan of Care/Certification Expiration Date: 23      PT Visit Info: Total # of Visits to Date: 20  Progress Note Counter: 10      OUTPATIENT PHYSICAL THERAPY:OP NOTE TYPE: Treatment Note 2023       Episode  }Appt Desk              Treatment Diagnosis:  Pain in left hip (M25.552)  Stiffness of Left Hip, Not elsewhere classified (A69.890)  Medical/Referring Diagnosis:  Left hip pain [M25.552]  Referring Physician:  Ananth Torres MD Orders:  PT Eval and Treat   Date of Onset:  No data recorded   Allergies:   Amoxicillin  Restrictions/Precautions:  No data recordedNo data recorded     Interventions Planned (Treatment may consist of any combination of the following):    Current Treatment Recommendations: Strengthening; ROM; Balance training; Functional mobility training; Transfer training; Endurance training; Gait training; Stair training; Neuromuscular re-education; Manual; Pain management; Return to work related activity; Home exercise program; Safety education & training; Modalities; Positioning; Therapeutic activities     Subjective Comments:  pt reports no pain in the LLE hip but does report at times some soreness  Initial:}    1/10Post Session:       1/10  Medications Last Reviewed:  2023  Updated Objective Findings:  See evaluation note from today  Treatment   THERAPEUTIC EXERCISE: (15 minutes):    Exercises per grid below to improve mobility, strength, balance and coordination. Required no visual, verbal, manual and tactile cues to promote proper body alignment, promote proper body posture, promote proper body mechanics and promote proper body breathing techniques.   Progressed resistance, range, repetitions and

## 2023-11-07 ENCOUNTER — PATIENT MESSAGE (OUTPATIENT)
Dept: FAMILY MEDICINE CLINIC | Facility: CLINIC | Age: 58
End: 2023-11-07

## 2023-11-07 NOTE — TELEPHONE ENCOUNTER
Appointment made on 11/9/23 with ROSCOE Oates. I am sending this to you as a heads up with what is going on with the patient for her appt on 11/9/23.

## 2023-11-08 NOTE — PROGRESS NOTES
Abram Scruggs (: 1965) is a 66 y.o. female, an established patient, is here for evaluation of the following chief complaint(s):  Chief Complaint   Patient presents with    Abdominal Pain          ASSESSMENT/PLAN:  Nena Flores was seen today for abdominal pain. Diagnoses and all orders for this visit:    Lower abdominal pain  -     AMB POC URINALYSIS DIP STICK AUTO W/O MICRO  -     CBC with Auto Differential; Future  -     metroNIDAZOLE (FLAGYL) 500 MG tablet; Take 1 tablet by mouth 2 times daily for 7 days  -     ciprofloxacin (CIPRO) 500 MG tablet; Take 1 tablet by mouth 2 times daily for 7 days  -     traMADol (ULTRAM) 50 MG tablet; Take 1 tablet by mouth 2 times daily as needed for Pain for up to 5 days. Intended supply: 5 days. Take lowest dose possible to manage pain Max Daily Amount: 100 mg    History of diverticulitis  -     metroNIDAZOLE (FLAGYL) 500 MG tablet; Take 1 tablet by mouth 2 times daily for 7 days  -     ciprofloxacin (CIPRO) 500 MG tablet; Take 1 tablet by mouth 2 times daily for 7 days  -     traMADol (ULTRAM) 50 MG tablet; Take 1 tablet by mouth 2 times daily as needed for Pain for up to 5 days. Intended supply: 5 days. Take lowest dose possible to manage pain Max Daily Amount: 100 mg        No fever at visit today. She has some tenderness in her LLQ of her abdomen. No N/V. She has a h/o diverticulitis. Will send to lab today to check a CBC and am prescribing Metronidazole and Cipro BID x 7 days each. Advised a clear liquid diet x 2-3 days. If symptoms do not improve or worsen, may need to go to the ER and/or will order a CT of her abdomen if needed. UA showed a moderate amount of Blood and she has had a h/o a kidney stone as well.     Results for orders placed or performed in visit on 23   AMB POC URINALYSIS DIP STICK AUTO W/O MICRO   Result Value Ref Range    Color (UA POC) Yellow     Clarity (UA POC) Slightly Cloudy     Glucose, Urine, POC Negative Negative

## 2023-11-09 ENCOUNTER — OFFICE VISIT (OUTPATIENT)
Dept: FAMILY MEDICINE CLINIC | Facility: CLINIC | Age: 58
End: 2023-11-09
Payer: COMMERCIAL

## 2023-11-09 VITALS
DIASTOLIC BLOOD PRESSURE: 82 MMHG | SYSTOLIC BLOOD PRESSURE: 118 MMHG | HEIGHT: 62 IN | TEMPERATURE: 98.7 F | WEIGHT: 203 LBS | HEART RATE: 95 BPM | BODY MASS INDEX: 37.36 KG/M2 | RESPIRATION RATE: 14 BRPM | OXYGEN SATURATION: 97 %

## 2023-11-09 DIAGNOSIS — R10.30 LOWER ABDOMINAL PAIN: Primary | ICD-10-CM

## 2023-11-09 DIAGNOSIS — R31.9 HEMATURIA, UNSPECIFIED TYPE: ICD-10-CM

## 2023-11-09 DIAGNOSIS — Z87.19 HISTORY OF DIVERTICULITIS: ICD-10-CM

## 2023-11-09 LAB
BASOPHILS # BLD: 0 K/UL (ref 0–0.2)
BASOPHILS NFR BLD: 0 % (ref 0–2)
BILIRUBIN, URINE, POC: NEGATIVE
BLOOD URINE, POC: ABNORMAL
DIFFERENTIAL METHOD BLD: ABNORMAL
EOSINOPHIL # BLD: 0.2 K/UL (ref 0–0.8)
EOSINOPHIL NFR BLD: 2 % (ref 0.5–7.8)
ERYTHROCYTE [DISTWIDTH] IN BLOOD BY AUTOMATED COUNT: 15.9 % (ref 11.9–14.6)
GLUCOSE URINE, POC: NEGATIVE
HCT VFR BLD AUTO: 42.4 % (ref 35.8–46.3)
HGB BLD-MCNC: 13.2 G/DL (ref 11.7–15.4)
IMM GRANULOCYTES # BLD AUTO: 0 K/UL (ref 0–0.5)
IMM GRANULOCYTES NFR BLD AUTO: 0 % (ref 0–5)
KETONES, URINE, POC: NEGATIVE
LEUKOCYTE ESTERASE, URINE, POC: NEGATIVE
LYMPHOCYTES # BLD: 1.4 K/UL (ref 0.5–4.6)
LYMPHOCYTES NFR BLD: 17 % (ref 13–44)
MCH RBC QN AUTO: 25.4 PG (ref 26.1–32.9)
MCHC RBC AUTO-ENTMCNC: 31.1 G/DL (ref 31.4–35)
MCV RBC AUTO: 81.7 FL (ref 82–102)
MONOCYTES # BLD: 0.6 K/UL (ref 0.1–1.3)
MONOCYTES NFR BLD: 7 % (ref 4–12)
NEUTS SEG # BLD: 6.2 K/UL (ref 1.7–8.2)
NEUTS SEG NFR BLD: 74 % (ref 43–78)
NITRITE, URINE, POC: NEGATIVE
NRBC # BLD: 0 K/UL (ref 0–0.2)
PH, URINE, POC: 5.5 (ref 4.6–8)
PLATELET # BLD AUTO: 382 K/UL (ref 150–450)
PMV BLD AUTO: 9.9 FL (ref 9.4–12.3)
PROTEIN,URINE, POC: 30
RBC # BLD AUTO: 5.19 M/UL (ref 4.05–5.2)
SPECIFIC GRAVITY, URINE, POC: >1.03 (ref 1–1.03)
URINALYSIS CLARITY, POC: ABNORMAL
URINALYSIS COLOR, POC: YELLOW
UROBILINOGEN, POC: ABNORMAL
WBC # BLD AUTO: 8.3 K/UL (ref 4.3–11.1)

## 2023-11-09 PROCEDURE — G8427 DOCREV CUR MEDS BY ELIG CLIN: HCPCS | Performed by: PHYSICIAN ASSISTANT

## 2023-11-09 PROCEDURE — G8417 CALC BMI ABV UP PARAM F/U: HCPCS | Performed by: PHYSICIAN ASSISTANT

## 2023-11-09 PROCEDURE — G8484 FLU IMMUNIZE NO ADMIN: HCPCS | Performed by: PHYSICIAN ASSISTANT

## 2023-11-09 PROCEDURE — 3017F COLORECTAL CA SCREEN DOC REV: CPT | Performed by: PHYSICIAN ASSISTANT

## 2023-11-09 PROCEDURE — 81003 URINALYSIS AUTO W/O SCOPE: CPT | Performed by: PHYSICIAN ASSISTANT

## 2023-11-09 PROCEDURE — 99214 OFFICE O/P EST MOD 30 MIN: CPT | Performed by: PHYSICIAN ASSISTANT

## 2023-11-09 PROCEDURE — 1036F TOBACCO NON-USER: CPT | Performed by: PHYSICIAN ASSISTANT

## 2023-11-09 RX ORDER — TRAMADOL HYDROCHLORIDE 50 MG/1
50 TABLET ORAL 2 TIMES DAILY PRN
Qty: 10 TABLET | Refills: 0 | Status: SHIPPED | OUTPATIENT
Start: 2023-11-09 | End: 2023-11-14

## 2023-11-09 RX ORDER — CIPROFLOXACIN 500 MG/1
500 TABLET, FILM COATED ORAL 2 TIMES DAILY
Qty: 14 TABLET | Refills: 0 | Status: SHIPPED | OUTPATIENT
Start: 2023-11-09 | End: 2023-11-16

## 2023-11-09 RX ORDER — METRONIDAZOLE 500 MG/1
500 TABLET ORAL 2 TIMES DAILY
Qty: 14 TABLET | Refills: 0 | Status: SHIPPED | OUTPATIENT
Start: 2023-11-09 | End: 2023-11-16

## 2023-11-10 ENCOUNTER — HOSPITAL ENCOUNTER (OUTPATIENT)
Dept: PHYSICAL THERAPY | Age: 58
Setting detail: RECURRING SERIES
Discharge: HOME | End: 2023-11-13
Payer: COMMERCIAL

## 2023-11-10 PROCEDURE — 97110 THERAPEUTIC EXERCISES: CPT

## 2023-11-10 PROCEDURE — 97112 NEUROMUSCULAR REEDUCATION: CPT

## 2023-11-10 NOTE — PROGRESS NOTES
Martha Scruggs  : 1965  Primary: [address]  Secondary:  [address] [place] [id number]  [address] [address]  Phone: [phone]  Fax: [phone] Plan Frequency: 2x a week for up to 90 days  Plan of Care/Certification Expiration Date: 23      PT Visit Info: Total # of Visits to Date: 24  Progress Note Counter: 1      OUTPATIENT PHYSICAL THERAPY:OP NOTE TYPE: Treatment Note 11/10/2023       Episode  }Appt Desk              Treatment Diagnosis:  Pain in left hip (M25.552)  Stiffness of Left Hip, Not elsewhere classified (H93.760)  Medical/Referring Diagnosis:  Left hip pain [M25.552]  Referring Physician:  Pérez Ahn MD Orders:  PT Eval and Treat   Date of Onset:  No data recorded   Allergies:   Amoxicillin  Restrictions/Precautions:  No data recordedNo data recorded     Interventions Planned (Treatment may consist of any combination of the following):    Current Treatment Recommendations: Strengthening; ROM; Balance training; Functional mobility training; Transfer training; Endurance training; Gait training; Stair training; Neuromuscular re-education; Manual; Pain management; Return to work related activity; Home exercise program; Safety education & training; Modalities; Positioning; Therapeutic activities     Subjective Comments:  pt reports LLE hip flexor region pain, pt reports having diverticulitis  Initial:}     /10Post Session:        /10  Medications Last Reviewed:  11/10/2023  Updated Objective Findings:  See evaluation note from today  Treatment   THERAPEUTIC EXERCISE: (15 minutes):    Exercises per grid below to improve mobility, strength, balance and coordination. Required no visual, verbal, manual and tactile cues to promote proper body alignment, promote proper body posture, promote proper body mechanics and promote proper body breathing techniques.   Progressed resistance, range, repetitions and

## 2023-11-10 NOTE — PROGRESS NOTES
Magaly Scruggs  : 1965  Primary: [address]  Secondary:  [address] [place] [address] [address]  Phone: [phone]  Fax: [phone] Plan Frequency: 2x a week for up to 90 days    Plan of Care/Certification Expiration Date: 23      PT Visit Info: Total # of Visits to Date: 24  Progress Note Counter: 1         OUTPATIENT PHYSICAL THERAPY 11/10/2023     Appt Desk   Episode   MyChart      I am accessing Ms. Scruggs's chart as a part of our department's internal chart auditing process. I certify that Ms. Scruggs is, or was, a patient in our department.     Thank you,  Kari Blair, PT  11/10/2023

## 2023-11-12 LAB
BACTERIA SPEC CULT: NORMAL
BACTERIA SPEC CULT: NORMAL
SERVICE CMNT-IMP: NORMAL

## 2023-11-15 DIAGNOSIS — R10.30 LOWER ABDOMINAL PAIN: Primary | ICD-10-CM

## 2023-11-15 DIAGNOSIS — Z87.442 HISTORY OF KIDNEY STONES: ICD-10-CM

## 2023-11-21 NOTE — PROGRESS NOTES
Length of meeting; 22 minutes    Start Time: 0900    Stop Time: 0922    Diagnosis:  Generalized anxiety disorder. Major depressive disorder, recurrent, in full remission. Treatment Plan: Focus on loss issues related to her mother. Will eventually focus on anxiety based on persistent worry that negatively impacts her life, using anxiety exposure and response activation treatment. Will also focus on self care, related to issues with sleep, and being attentive to her overall health. Patient Prognosis: Guarded at present time. Patient Progress: The patient indicates that she  is \"good \". In prior sessions, the patient had noted that she had little to no anxiety and was not sure that she wanted to continue the treatment process related to her anxiety. I had also talked with the patient about taking a different approach (such as CBT) for her anxiety, and also broached that the patient can, if she chooses to, be referred to a different therapist.  She had wanted to consider this after our last appointments. She notes \"I am in a good place tight now. \"  She notes that she has few if any events that are problematic for her. After discussion of options, she would like to reschedule with me in about 3 months. She notes that the anniversary date of her father is approaching. We talked about some steps she could take in responding to grief and loss issues there. Mental Status exam: No current active SI or HI. The patient continues to contract for safety. Thought processes appear normal.  Speech is goal directed. The patient does not appear to be responding to internal stimuli. Plan: I will see this patient again in about 3 months.     BUSTER Barreto

## 2023-11-22 ENCOUNTER — HOSPITAL ENCOUNTER (OUTPATIENT)
Dept: CT IMAGING | Age: 58
Discharge: HOME | End: 2023-11-25
Payer: COMMERCIAL

## 2023-11-22 DIAGNOSIS — Z87.442 HISTORY OF KIDNEY STONES: ICD-10-CM

## 2023-11-22 DIAGNOSIS — R10.30 LOWER ABDOMINAL PAIN: ICD-10-CM

## 2023-11-22 PROCEDURE — 74176 CT ABD & PELVIS W/O CONTRAST: CPT

## 2023-11-28 ENCOUNTER — NURSE ONLY (OUTPATIENT)
Dept: FAMILY MEDICINE CLINIC | Facility: CLINIC | Age: 58
End: 2023-11-28

## 2023-11-28 DIAGNOSIS — E03.9 HYPOTHYROIDISM, ADULT: ICD-10-CM

## 2023-11-28 DIAGNOSIS — E11.9 CONTROLLED TYPE 2 DIABETES MELLITUS WITHOUT COMPLICATION, WITHOUT LONG-TERM CURRENT USE OF INSULIN: ICD-10-CM

## 2023-11-28 LAB
ANION GAP SERPL CALC-SCNC: 3 MMOL/L (ref 2–11)
BUN SERPL-MCNC: 19 MG/DL (ref 6–23)
CALCIUM SERPL-MCNC: 9.8 MG/DL (ref 8.3–10.4)
CHLORIDE SERPL-SCNC: 108 MMOL/L (ref 101–110)
CO2 SERPL-SCNC: 27 MMOL/L (ref 21–32)
CREAT SERPL-MCNC: 1 MG/DL (ref 0.6–1)
GLUCOSE SERPL-MCNC: 124 MG/DL (ref 65–100)
POTASSIUM SERPL-SCNC: 4.3 MMOL/L (ref 3.5–5.1)
SODIUM SERPL-SCNC: 138 MMOL/L (ref 133–143)
TSH, 3RD GENERATION: 1.33 UIU/ML (ref 0.36–3.74)

## 2023-11-29 ENCOUNTER — HOSPITAL ENCOUNTER (OUTPATIENT)
Dept: PHYSICAL THERAPY | Age: 58
Setting detail: RECURRING SERIES
Discharge: HOME | End: 2023-12-02
Payer: COMMERCIAL

## 2023-11-29 LAB
EST. AVERAGE GLUCOSE BLD GHB EST-MCNC: 143 MG/DL
HBA1C MFR BLD: 6.6 % (ref 4.8–5.6)

## 2023-11-29 PROCEDURE — 97112 NEUROMUSCULAR REEDUCATION: CPT

## 2023-11-29 PROCEDURE — 97110 THERAPEUTIC EXERCISES: CPT

## 2023-11-29 NOTE — PROGRESS NOTES
Ibis Scruggs  : 1965  Primary: [address]  Secondary:  [address] [place] [address] [address]  Phone: [phone]  Fax: [phone] Plan Frequency: 2x a week for up to 90 days  Plan of Care/Certification Expiration Date: 23      PT Visit Info: Total # of Visits to Date: 25  Progress Note Counter: 2      OUTPATIENT PHYSICAL THERAPY:OP NOTE TYPE: Treatment Note 2023       Episode  }Appt Desk              Treatment Diagnosis:  Pain in left hip (M25.552)  Stiffness of Left Hip, Not elsewhere classified (A29.928)  Medical/Referring Diagnosis:  Left hip pain [M25.552]  Referring Physician:  Marshall Osman MD Orders:  PT Eval and Treat   Date of Onset:  No data recorded   Allergies:   Amoxicillin  Restrictions/Precautions:  No data recordedNo data recorded     Interventions Planned (Treatment may consist of any combination of the following):    Current Treatment Recommendations: Strengthening; ROM; Balance training; Functional mobility training; Transfer training; Endurance training; Gait training; Stair training; Neuromuscular re-education; Manual; Pain management; Return to work related activity; Home exercise program; Safety education & training; Modalities; Positioning; Therapeutic activities     Subjective Comments: pt reports no pain, improvement w/ LLE hip strength      Initial:}     /10Post Session:        /10  Medications Last Reviewed:  2023  Updated Objective Findings:  See evaluation note from today  Treatment   THERAPEUTIC EXERCISE: (15 minutes):    Exercises per grid below to improve mobility, strength, balance and coordination. Required no visual, verbal, manual and tactile cues to promote proper body alignment, promote proper body posture, promote proper body mechanics and promote proper body breathing techniques.   Progressed resistance, range, repetitions and complexity of movement

## 2023-12-01 ENCOUNTER — HOSPITAL ENCOUNTER (OUTPATIENT)
Dept: PHYSICAL THERAPY | Age: 58
Setting detail: RECURRING SERIES
Discharge: HOME | End: 2023-12-04
Payer: COMMERCIAL

## 2023-12-01 PROCEDURE — 97112 NEUROMUSCULAR REEDUCATION: CPT

## 2023-12-01 PROCEDURE — 97110 THERAPEUTIC EXERCISES: CPT

## 2023-12-01 NOTE — PROGRESS NOTES
Kasandra Goodell Mattas  : 1965  Primary: [address]  Secondary:  [address] [place] [address]  [address] [address]  Phone: [phone]  Fax: [phone] Plan Frequency: 2x a week for up to 90 days  Plan of Care/Certification Expiration Date: 23      PT Visit Info: Total # of Visits to Date: 21  Progress Note Counter: 3      OUTPATIENT PHYSICAL THERAPY:OP NOTE TYPE: Treatment Note 2023       Episode  }Appt Desk              Treatment Diagnosis:  Pain in left hip (M25.552)  Stiffness of Left Hip, Not elsewhere classified (R48.295)  Medical/Referring Diagnosis:  Left hip pain [M25.552]  Referring Physician:  Briseida Romero MD Orders:  PT Eval and Treat   Date of Onset:  No data recorded   Allergies:   Amoxicillin  Restrictions/Precautions:  No data recordedNo data recorded     Interventions Planned (Treatment may consist of any combination of the following):    Current Treatment Recommendations: Strengthening; ROM; Balance training; Functional mobility training; Transfer training; Endurance training; Gait training; Stair training; Neuromuscular re-education; Manual; Pain management; Return to work related activity; Home exercise program; Safety education & training; Modalities; Positioning; Therapeutic activities     Subjective Comments: pt reports no pain, improvement w/ LLE hip strength and stamina      Initial:}     /10Post Session:        /10  Medications Last Reviewed:  2023  Updated Objective Findings:  See evaluation note from today  Treatment   THERAPEUTIC EXERCISE: (15 minutes):    Exercises per grid below to improve mobility, strength, balance and coordination. Required no visual, verbal, manual and tactile cues to promote proper body alignment, promote proper body posture, promote proper body mechanics and promote proper body breathing techniques.   Progressed resistance, range, repetitions and complexity of

## 2023-12-02 ASSESSMENT — PATIENT HEALTH QUESTIONNAIRE - PHQ9
SUM OF ALL RESPONSES TO PHQ QUESTIONS 1-9: 4
5. POOR APPETITE OR OVEREATING: 0
SUM OF ALL RESPONSES TO PHQ QUESTIONS 1-9: 4
2. FEELING DOWN, DEPRESSED OR HOPELESS: 0
4. FEELING TIRED OR HAVING LITTLE ENERGY: 1
SUM OF ALL RESPONSES TO PHQ QUESTIONS 1-9: 4
8. MOVING OR SPEAKING SO SLOWLY THAT OTHER PEOPLE COULD HAVE NOTICED. OR THE OPPOSITE, BEING SO FIGETY OR RESTLESS THAT YOU HAVE BEEN MOVING AROUND A LOT MORE THAN USUAL: 0
9. THOUGHTS THAT YOU WOULD BE BETTER OFF DEAD, OR OF HURTING YOURSELF: 0
10. IF YOU CHECKED OFF ANY PROBLEMS, HOW DIFFICULT HAVE THESE PROBLEMS MADE IT FOR YOU TO DO YOUR WORK, TAKE CARE OF THINGS AT HOME, OR GET ALONG WITH OTHER PEOPLE: 0
3. TROUBLE FALLING OR STAYING ASLEEP: 2
1. LITTLE INTEREST OR PLEASURE IN DOING THINGS: 0
7. TROUBLE CONCENTRATING ON THINGS, SUCH AS READING THE NEWSPAPER OR WATCHING TELEVISION: 0
SUM OF ALL RESPONSES TO PHQ9 QUESTIONS 1 & 2: 0
SUM OF ALL RESPONSES TO PHQ QUESTIONS 1-9: 4
6. FEELING BAD ABOUT YOURSELF - OR THAT YOU ARE A FAILURE OR HAVE LET YOURSELF OR YOUR FAMILY DOWN: 1

## 2023-12-03 PROBLEM — R23.2 HOT FLASHES: Status: ACTIVE | Noted: 2023-12-03

## 2023-12-03 NOTE — PROGRESS NOTES
Lipid Panel     Standing Status:   Future     Standing Expiration Date:   12/5/2024    Microalbumin / Creatinine Urine Ratio     Standing Status:   Future     Standing Expiration Date:   12/5/2024    ciprofloxacin (CIPRO) 500 MG tablet     Sig: Take 1 tablet by mouth 2 times daily for 7 days     Dispense:  14 tablet     Refill:  0    metroNIDAZOLE (FLAGYL) 500 MG tablet     Sig: Take 1 tablet by mouth 2 times daily for 7 days     Dispense:  14 tablet     Refill:  0    levothyroxine (SYNTHROID) 50 MCG tablet     Sig: Take 1 tablet by mouth every morning (before breakfast)     Dispense:  90 tablet     Refill:  3    famotidine (PEPCID) 20 MG tablet     Sig: Take 1 tablet by mouth daily as needed (acid reflux)     Dispense:  90 tablet     Refill:  1    lisinopril (PRINIVIL;ZESTRIL) 5 MG tablet     Sig: Take 1 tablet by mouth daily TAKE ONE TABLET BY MOUTH ONE TIME DAILY AT NIGHT     Dispense:  90 tablet     Refill:  1    metFORMIN (GLUCOPHAGE) 500 MG tablet     Sig: Take 1 tablet by mouth daily TAKE ONE TABLET BY MOUTH ONE TIME DAILY AT NIGHT     Dispense:  90 tablet     Refill:  1    rosuvastatin (CRESTOR) 5 MG tablet     Sig: Take 1 tablet by mouth daily     Dispense:  90 tablet     Refill:  1    Semaglutide, 1 MG/DOSE, (OZEMPIC, 1 MG/DOSE,) 4 MG/3ML SOPN     Sig: Inject 1 mg into the skin once a week     Dispense:  9 mL     Refill:  1     Please cancel prior rx sent over w/ instructions to administer 1 ml     Juanita Osman, was evaluated through a synchronous (real-time) audio-video encounter. The patient (or guardian if applicable) is aware that this is a billable service, which includes applicable co-pays. This Virtual Visit was conducted with patient's (and/or legal guardian's) consent. The visit was conducted pursuant to the emergency declaration under the [address] and the [email or web address] and [employer] General Act.

## 2023-12-04 ENCOUNTER — OFFICE VISIT (OUTPATIENT)
Dept: BEHAVIORAL/MENTAL HEALTH CLINIC | Age: 58
End: 2023-12-04

## 2023-12-04 DIAGNOSIS — F33.42 MDD (MAJOR DEPRESSIVE DISORDER), RECURRENT, IN FULL REMISSION (HCC): ICD-10-CM

## 2023-12-04 DIAGNOSIS — F41.1 GENERALIZED ANXIETY DISORDER: Primary | ICD-10-CM

## 2023-12-04 ASSESSMENT — PATIENT HEALTH QUESTIONNAIRE - PHQ9
SUM OF ALL RESPONSES TO PHQ QUESTIONS 1-9: 4
SUM OF ALL RESPONSES TO PHQ9 QUESTIONS 1 & 2: 0
8. MOVING OR SPEAKING SO SLOWLY THAT OTHER PEOPLE COULD HAVE NOTICED. OR THE OPPOSITE - BEING SO FIDGETY OR RESTLESS THAT YOU HAVE BEEN MOVING AROUND A LOT MORE THAN USUAL: NOT AT ALL
SUM OF ALL RESPONSES TO PHQ QUESTIONS 1-9: 4
8. MOVING OR SPEAKING SO SLOWLY THAT OTHER PEOPLE COULD HAVE NOTICED. OR THE OPPOSITE, BEING SO FIGETY OR RESTLESS THAT YOU HAVE BEEN MOVING AROUND A LOT MORE THAN USUAL: 0
7. TROUBLE CONCENTRATING ON THINGS, SUCH AS READING THE NEWSPAPER OR WATCHING TELEVISION: 0
6. FEELING BAD ABOUT YOURSELF - OR THAT YOU ARE A FAILURE OR HAVE LET YOURSELF OR YOUR FAMILY DOWN: SEVERAL DAYS
5. POOR APPETITE OR OVEREATING: 0
1. LITTLE INTEREST OR PLEASURE IN DOING THINGS: 0
SUM OF ALL RESPONSES TO PHQ QUESTIONS 1-9: 4
2. FEELING DOWN, DEPRESSED OR HOPELESS: NOT AT ALL
SUM OF ALL RESPONSES TO PHQ QUESTIONS 1-9: 4
10. IF YOU CHECKED OFF ANY PROBLEMS, HOW DIFFICULT HAVE THESE PROBLEMS MADE IT FOR YOU TO DO YOUR WORK, TAKE CARE OF THINGS AT HOME, OR GET ALONG WITH OTHER PEOPLE: NOT DIFFICULT AT ALL
1. LITTLE INTEREST OR PLEASURE IN DOING THINGS: NOT AT ALL
10. IF YOU CHECKED OFF ANY PROBLEMS, HOW DIFFICULT HAVE THESE PROBLEMS MADE IT FOR YOU TO DO YOUR WORK, TAKE CARE OF THINGS AT HOME, OR GET ALONG WITH OTHER PEOPLE: 0
3. TROUBLE FALLING OR STAYING ASLEEP: MORE THAN HALF THE DAYS
6. FEELING BAD ABOUT YOURSELF - OR THAT YOU ARE A FAILURE OR HAVE LET YOURSELF OR YOUR FAMILY DOWN: 1
9. THOUGHTS THAT YOU WOULD BE BETTER OFF DEAD, OR OF HURTING YOURSELF: NOT AT ALL
SUM OF ALL RESPONSES TO PHQ QUESTIONS 1-9: 4
4. FEELING TIRED OR HAVING LITTLE ENERGY: SEVERAL DAYS
9. THOUGHTS THAT YOU WOULD BE BETTER OFF DEAD, OR OF HURTING YOURSELF: 0
3. TROUBLE FALLING OR STAYING ASLEEP: 2
4. FEELING TIRED OR HAVING LITTLE ENERGY: 1
5. POOR APPETITE OR OVEREATING: NOT AT ALL
2. FEELING DOWN, DEPRESSED OR HOPELESS: 0
7. TROUBLE CONCENTRATING ON THINGS, SUCH AS READING THE NEWSPAPER OR WATCHING TELEVISION: NOT AT ALL

## 2023-12-05 ENCOUNTER — TELEMEDICINE (OUTPATIENT)
Dept: FAMILY MEDICINE CLINIC | Facility: CLINIC | Age: 58
End: 2023-12-05
Payer: COMMERCIAL

## 2023-12-05 DIAGNOSIS — K21.9 GASTROESOPHAGEAL REFLUX DISEASE, UNSPECIFIED WHETHER ESOPHAGITIS PRESENT: ICD-10-CM

## 2023-12-05 DIAGNOSIS — Z87.19 HISTORY OF DIVERTICULITIS: ICD-10-CM

## 2023-12-05 DIAGNOSIS — R10.30 LOWER ABDOMINAL PAIN: ICD-10-CM

## 2023-12-05 DIAGNOSIS — E78.5 DYSLIPIDEMIA: ICD-10-CM

## 2023-12-05 DIAGNOSIS — E03.9 HYPOTHYROIDISM, ADULT: ICD-10-CM

## 2023-12-05 DIAGNOSIS — E11.9 CONTROLLED TYPE 2 DIABETES MELLITUS WITHOUT COMPLICATION, WITHOUT LONG-TERM CURRENT USE OF INSULIN (HCC): Primary | ICD-10-CM

## 2023-12-05 PROCEDURE — 99214 OFFICE O/P EST MOD 30 MIN: CPT | Performed by: PHYSICIAN ASSISTANT

## 2023-12-05 PROCEDURE — 3017F COLORECTAL CA SCREEN DOC REV: CPT | Performed by: PHYSICIAN ASSISTANT

## 2023-12-05 PROCEDURE — 1036F TOBACCO NON-USER: CPT | Performed by: PHYSICIAN ASSISTANT

## 2023-12-05 PROCEDURE — G8417 CALC BMI ABV UP PARAM F/U: HCPCS | Performed by: PHYSICIAN ASSISTANT

## 2023-12-05 PROCEDURE — 2022F DILAT RTA XM EVC RTNOPTHY: CPT | Performed by: PHYSICIAN ASSISTANT

## 2023-12-05 PROCEDURE — 3044F HG A1C LEVEL LT 7.0%: CPT | Performed by: PHYSICIAN ASSISTANT

## 2023-12-05 PROCEDURE — G8427 DOCREV CUR MEDS BY ELIG CLIN: HCPCS | Performed by: PHYSICIAN ASSISTANT

## 2023-12-05 PROCEDURE — G8484 FLU IMMUNIZE NO ADMIN: HCPCS | Performed by: PHYSICIAN ASSISTANT

## 2023-12-05 RX ORDER — LISINOPRIL 5 MG/1
5 TABLET ORAL DAILY
Qty: 90 TABLET | Refills: 1 | Status: SHIPPED | OUTPATIENT
Start: 2023-12-05

## 2023-12-05 RX ORDER — LEVOTHYROXINE SODIUM 0.05 MG/1
50 TABLET ORAL
Qty: 90 TABLET | Refills: 3 | Status: SHIPPED | OUTPATIENT
Start: 2023-12-05

## 2023-12-05 RX ORDER — ROSUVASTATIN CALCIUM 5 MG/1
5 TABLET, COATED ORAL DAILY
Qty: 90 TABLET | Refills: 1 | Status: SHIPPED | OUTPATIENT
Start: 2023-12-05

## 2023-12-05 RX ORDER — FAMOTIDINE 20 MG/1
20 TABLET, FILM COATED ORAL DAILY PRN
Qty: 90 TABLET | Refills: 1 | Status: SHIPPED | OUTPATIENT
Start: 2023-12-05

## 2023-12-05 RX ORDER — METRONIDAZOLE 500 MG/1
500 TABLET ORAL 2 TIMES DAILY
Qty: 14 TABLET | Refills: 0 | Status: SHIPPED | OUTPATIENT
Start: 2023-12-05 | End: 2023-12-12

## 2023-12-05 RX ORDER — SEMAGLUTIDE 1.34 MG/ML
1 INJECTION, SOLUTION SUBCUTANEOUS WEEKLY
Qty: 9 ML | Refills: 1 | Status: SHIPPED | OUTPATIENT
Start: 2023-12-05

## 2023-12-05 RX ORDER — CIPROFLOXACIN 500 MG/1
500 TABLET, FILM COATED ORAL 2 TIMES DAILY
Qty: 14 TABLET | Refills: 0 | Status: SHIPPED | OUTPATIENT
Start: 2023-12-05 | End: 2023-12-12

## 2023-12-06 ENCOUNTER — HOSPITAL ENCOUNTER (OUTPATIENT)
Dept: PHYSICAL THERAPY | Age: 58
Setting detail: RECURRING SERIES
Discharge: HOME | End: 2023-12-09
Payer: COMMERCIAL

## 2023-12-06 PROCEDURE — 97110 THERAPEUTIC EXERCISES: CPT

## 2023-12-06 PROCEDURE — 97112 NEUROMUSCULAR REEDUCATION: CPT

## 2023-12-06 NOTE — PROGRESS NOTES
Sidelying hip abduction  X 40 #1 BLE  X 40 #1 BL X 40 #1 BL X 40 #1 BL   TRX squats        Quadruped hip abduction, ER cues for contralateral glute med activation, ipsilateral ABD-ER  X 50 BTB BLE ABD  X 50 BTB BLE EXT      Standing glute med recruitment cues for BLE ABD/ER activation       Monster walks        Sidelying side plank + neutral ABD holds, cues for QL activation, glute med activation, deep core activation, oblique activation            Treatment/Session Summary:    Treatment Assessment: pt is tolerated tx well. Pt reports less pain and more LLE strength. Will progress prn. Communication/Consultation:  None today  Equipment provided today:  None  Recommendations/Intent for next treatment session: Next visit will focus on BLE ER strength, ABD strength, SLS stability and ART to TFL/ABD/ER of the LLE hip.     Total Treatment Billable Duration:  38 minutes    Time In: 1702  Time Out: [address], PT       Charge Capture  }Post Session Pain  MedBridge Portal  MD Guidelines  Scanned Media  Benefits  MyChart    Future Appointments   Date Time Provider [address]   12/15/2023 10:15 AM Gustabo Malcolm PT [facility]   1/16/2024  8:00 AM [facility] [place] [facility]   3/6/2024  9:00 AM BHAVESH Rodriguez [facility] [place] [facility]   3/11/2024  8:00 AM [facility] [place] [facility]   5/8/2024  8:30 AM [facility] [place] [facility]   5/15/2024  9:00 AM Rafael Bailey PA [facility] [place] AM[facility]

## 2024-01-10 ENCOUNTER — PREP FOR PROCEDURE (OUTPATIENT)
Dept: SURGERY | Age: 59
End: 2024-01-10

## 2024-01-10 ENCOUNTER — OFFICE VISIT (OUTPATIENT)
Dept: SURGERY | Age: 59
End: 2024-01-10
Payer: COMMERCIAL

## 2024-01-10 VITALS
SYSTOLIC BLOOD PRESSURE: 122 MMHG | DIASTOLIC BLOOD PRESSURE: 76 MMHG | HEART RATE: 87 BPM | BODY MASS INDEX: 36.85 KG/M2 | WEIGHT: 201.5 LBS

## 2024-01-10 DIAGNOSIS — K40.20 NON-RECURRENT BILATERAL INGUINAL HERNIA WITHOUT OBSTRUCTION OR GANGRENE: ICD-10-CM

## 2024-01-10 DIAGNOSIS — R10.32 LEFT INGUINAL PAIN: Primary | ICD-10-CM

## 2024-01-10 PROCEDURE — 1036F TOBACCO NON-USER: CPT | Performed by: SURGERY

## 2024-01-10 PROCEDURE — G8484 FLU IMMUNIZE NO ADMIN: HCPCS | Performed by: SURGERY

## 2024-01-10 PROCEDURE — 3017F COLORECTAL CA SCREEN DOC REV: CPT | Performed by: SURGERY

## 2024-01-10 PROCEDURE — G8417 CALC BMI ABV UP PARAM F/U: HCPCS | Performed by: SURGERY

## 2024-01-10 PROCEDURE — 99204 OFFICE O/P NEW MOD 45 MIN: CPT | Performed by: SURGERY

## 2024-01-10 PROCEDURE — G8427 DOCREV CUR MEDS BY ELIG CLIN: HCPCS | Performed by: SURGERY

## 2024-01-10 RX ORDER — BACILLUS COAGULANS/INULIN 1B-250 MG
CAPSULE ORAL DAILY
COMMUNITY

## 2024-01-10 ASSESSMENT — ENCOUNTER SYMPTOMS
CONSTIPATION: 0
EYE PAIN: 0
NAUSEA: 0
EYE DISCHARGE: 0
WHEEZING: 0
SHORTNESS OF BREATH: 0
COUGH: 0
DIARRHEA: 0
ABDOMINAL PAIN: 0
SINUS PAIN: 0
EYE ITCHING: 0
SORE THROAT: 0
BACK PAIN: 0
COLOR CHANGE: 0
SINUS PRESSURE: 0
VOMITING: 0

## 2024-01-10 NOTE — PROGRESS NOTES
Cherelle Martinez MD   Bariatric & Advanced Laparoscopic Surgery & Endoscopy  [address], [address]  [place] [address]  Phone [phone]   Fax [phone]      Date of visit: 1/10/2024      Primary/Requesting provider: Marianela aBiley PA         Name: Glenda Scruggs      MRN: [id number]       : 1965       Age: 58 y.o.    Sex: female        PCP: Marianela Bailey PA     CC:    Chief Complaint   Patient presents with    New Patient     NP - Bilateral inguinal hernia       HPI:    Glenda Scruggs is a 58 y.o. female who presents for evaluation of left inguinal pain that she noticed about 3 months ago during PT exercises.    She report area is painful.  Pain is 6/10.  Pain is better with nothing and worse with exercises.  She had a CT scan and she was referred here for a hernia.    Previous abdominal surgeries - none      Blood thinners - denies  Immunosuppressants - denies  Smoking - denies      PMH:    Past Medical History:   Diagnosis Date    Adverse effect of anesthesia     delayed awakening; pt reports also teary post-op    Anxiety     Depression     Headache     History of kidney stones     Thyroid disease        PSH:    Past Surgical History:   Procedure Laterality Date    BREAST REDUCTION SURGERY      COLONOSCOPY      DILATION AND CURETTAGE OF UTERUS  10/2017    endometrial ablation    FRAGMENT KIDNEY STONE/ ESWL      HEENT      GUM SURGURY    HEENT  2018    sinus    TRABECULECTOMY      Dr. Anguiano       MEDS:    Current Outpatient Medications   Medication Sig Dispense Refill    Bacillus Coagulans-Inulin (PROBIOTIC) 1-250 BILLION-MG CAPS Take by mouth daily      ibuprofen (ADVIL;MOTRIN) 600 MG tablet Take 1 tablet by mouth 3 times daily as needed for Pain 30 tablet 0    levothyroxine (SYNTHROID) 50 MCG tablet Take 1 tablet by mouth every morning (before breakfast) 90 tablet 3    famotidine (PEPCID) 20 MG tablet Take 1 tablet by mouth

## 2024-01-10 NOTE — H&P (VIEW-ONLY)
the risks and elects to proceed. She has had adequate time to have her questions answered.      3. We specifically discussed the need to take 1-2 weeks off from work followed by light duty for 6 weeks. No heavy lifting, not greater than 20 lbs. for 6 weeks total to enable optimal healing and prevent recurrence.     4. Follow up 2 weeks after surgery.          Time: I spent 45 minutes preparing to see patient (including chart review and preparation), obtaining and/or reviewing additional medical history, performing a physical exam and evaluation, documenting clinical information in the electronic health record, independently interpreting results, communicating results to patient, family or caregiver, and/or coordinating care.      Signed: Cherelle Martinez MD  Bariatric & Minimally Invasive Surgery  1/10/2024 11:30 AM

## 2024-01-17 NOTE — PERIOP NOTE
Patient verified name and .  Order for consent not found in EHR.   Type 2 surgery, PAT phone assessment complete.    Labs per surgeon: Orders not received.  Labs per anesthesia protocol: Hgb- pt to come in on 24  EKG: pt to come in on 24    Patient answered medical/surgical history questions at their best of ability. All prior to admission medications documented in EPIC.  Patient instructed to take the following medications the day of surgery according to anesthesia guidelines with a small sip of water: synthroid. On the day before surgery please take 2 Tylenol in the morning and then again before bed. You may use either regular or extra strength.   Hold all vitamins 7 days prior to surgery and NSAIDS 5 days prior to surgery. Prescription meds to hold: ozempic. Pt last took ozempic yesterday. Will have anesthesia review chart.     Patient instructed on the following:    > Arrive at A Entrance, time of arrival to be called the day before by 1700  > Clear liquids the day before surgery. NPO after midnight, unless otherwise indicated, including gum, mints, and ice chips  > Responsible adult must drive patient to the hospital, stay during surgery, and patient will need supervision 24 hours after anesthesia  > Use non moisturizing soap in shower the night before surgery and on the morning of surgery  > All piercings must be removed prior to arrival.    > Leave all valuables (money and jewelry) at home but bring insurance card and ID on DOS.   > Do not wear make-up, nail polish, lotions, cologne, perfumes, powders, or oil on skin. Artificial nails are not permitted.

## 2024-01-18 ENCOUNTER — HOSPITAL ENCOUNTER (OUTPATIENT)
Dept: SURGERY | Age: 59
Discharge: HOME | End: 2024-01-18
Payer: COMMERCIAL

## 2024-01-18 LAB
EKG ATRIAL RATE: 75 BPM
EKG DIAGNOSIS: NORMAL
EKG P AXIS: 54 DEGREES
EKG P-R INTERVAL: 160 MS
EKG Q-T INTERVAL: 388 MS
EKG QRS DURATION: 80 MS
EKG QTC CALCULATION (BAZETT): 433 MS
EKG R AXIS: -18 DEGREES
EKG T AXIS: 36 DEGREES
EKG VENTRICULAR RATE: 75 BPM
HGB BLD-MCNC: 13.2 G/DL (ref 11.7–15.4)

## 2024-01-18 PROCEDURE — 85018 HEMOGLOBIN: CPT

## 2024-01-18 PROCEDURE — 93005 ELECTROCARDIOGRAM TRACING: CPT | Performed by: ANESTHESIOLOGY

## 2024-01-18 PROCEDURE — 93010 ELECTROCARDIOGRAM REPORT: CPT | Performed by: INTERNAL MEDICINE

## 2024-01-18 NOTE — PROGRESS NOTES
Dr. Ruth reviewed chart. New order received \"make sure she only has clear liquids the day before surgery.\" Ok to proceed.  Patient notified and verbalized understanding.

## 2024-01-21 ENCOUNTER — ANESTHESIA EVENT (OUTPATIENT)
Dept: SURGERY | Age: 59
End: 2024-01-21
Payer: COMMERCIAL

## 2024-01-22 ENCOUNTER — ANESTHESIA (OUTPATIENT)
Dept: SURGERY | Age: 59
End: 2024-01-22
Payer: COMMERCIAL

## 2024-01-22 ENCOUNTER — HOSPITAL ENCOUNTER (OUTPATIENT)
Age: 59
Setting detail: OUTPATIENT SURGERY
Discharge: HOME | End: 2024-01-22
Attending: SURGERY | Admitting: SURGERY
Payer: COMMERCIAL

## 2024-01-22 VITALS
OXYGEN SATURATION: 94 % | BODY MASS INDEX: 36.62 KG/M2 | DIASTOLIC BLOOD PRESSURE: 82 MMHG | RESPIRATION RATE: 18 BRPM | HEART RATE: 65 BPM | WEIGHT: 199 LBS | TEMPERATURE: 97.9 F | SYSTOLIC BLOOD PRESSURE: 102 MMHG | HEIGHT: 62 IN

## 2024-01-22 DIAGNOSIS — K40.20 NON-RECURRENT BILATERAL INGUINAL HERNIA WITHOUT OBSTRUCTION OR GANGRENE: Primary | ICD-10-CM

## 2024-01-22 LAB
GLUCOSE BLD STRIP.AUTO-MCNC: 116 MG/DL (ref 65–100)
SERVICE CMNT-IMP: ABNORMAL

## 2024-01-22 PROCEDURE — 6360000002 HC RX W HCPCS: Performed by: ANESTHESIOLOGY

## 2024-01-22 PROCEDURE — 6360000002 HC RX W HCPCS: Performed by: SURGERY

## 2024-01-22 PROCEDURE — 6370000000 HC RX 637 (ALT 250 FOR IP): Performed by: ANESTHESIOLOGY

## 2024-01-22 PROCEDURE — S2900 ROBOTIC SURGICAL SYSTEM: HCPCS | Performed by: SURGERY

## 2024-01-22 PROCEDURE — 3600000009 HC SURGERY ROBOT BASE: Performed by: SURGERY

## 2024-01-22 PROCEDURE — 3700000001 HC ADD 15 MINUTES (ANESTHESIA): Performed by: SURGERY

## 2024-01-22 PROCEDURE — 3600000019 HC SURGERY ROBOT ADDTL 15MIN: Performed by: SURGERY

## 2024-01-22 PROCEDURE — 82962 GLUCOSE BLOOD TEST: CPT

## 2024-01-22 PROCEDURE — 6370000000 HC RX 637 (ALT 250 FOR IP): Performed by: NURSE ANESTHETIST, CERTIFIED REGISTERED

## 2024-01-22 PROCEDURE — 2709999900 HC NON-CHARGEABLE SUPPLY: Performed by: SURGERY

## 2024-01-22 PROCEDURE — C1781 MESH (IMPLANTABLE): HCPCS | Performed by: SURGERY

## 2024-01-22 PROCEDURE — 2500000003 HC RX 250 WO HCPCS: Performed by: NURSE ANESTHETIST, CERTIFIED REGISTERED

## 2024-01-22 PROCEDURE — 7100000010 HC PHASE II RECOVERY - FIRST 15 MIN: Performed by: SURGERY

## 2024-01-22 PROCEDURE — 7100000000 HC PACU RECOVERY - FIRST 15 MIN: Performed by: SURGERY

## 2024-01-22 PROCEDURE — 2580000003 HC RX 258: Performed by: ANESTHESIOLOGY

## 2024-01-22 PROCEDURE — 7100000011 HC PHASE II RECOVERY - ADDTL 15 MIN: Performed by: SURGERY

## 2024-01-22 PROCEDURE — 7100000001 HC PACU RECOVERY - ADDTL 15 MIN: Performed by: SURGERY

## 2024-01-22 PROCEDURE — 3700000000 HC ANESTHESIA ATTENDED CARE: Performed by: SURGERY

## 2024-01-22 PROCEDURE — 6360000002 HC RX W HCPCS: Performed by: NURSE ANESTHETIST, CERTIFIED REGISTERED

## 2024-01-22 PROCEDURE — 49650 LAP ING HERNIA REPAIR INIT: CPT | Performed by: SURGERY

## 2024-01-22 DEVICE — MESH HERN LAP SELF FIXATING LT ANAT POLYLACTIC ACID PROGRIP: Type: IMPLANTABLE DEVICE | Site: INGUINAL | Status: FUNCTIONAL

## 2024-01-22 DEVICE — MESH HERN W15XL10CM TEXTILE MFIL POLYETH TEREPHTHALATE: Type: IMPLANTABLE DEVICE | Site: INGUINAL | Status: FUNCTIONAL

## 2024-01-22 RX ORDER — KETAMINE HYDROCHLORIDE 50 MG/ML
INJECTION, SOLUTION INTRAMUSCULAR; INTRAVENOUS PRN
Status: DISCONTINUED | OUTPATIENT
Start: 2024-01-22 | End: 2024-01-22 | Stop reason: SDUPTHER

## 2024-01-22 RX ORDER — SODIUM CHLORIDE, SODIUM LACTATE, POTASSIUM CHLORIDE, CALCIUM CHLORIDE 600; 310; 30; 20 MG/100ML; MG/100ML; MG/100ML; MG/100ML
INJECTION, SOLUTION INTRAVENOUS CONTINUOUS
Status: DISCONTINUED | OUTPATIENT
Start: 2024-01-22 | End: 2024-01-22 | Stop reason: HOSPADM

## 2024-01-22 RX ORDER — MIDAZOLAM HYDROCHLORIDE 2 MG/2ML
2 INJECTION, SOLUTION INTRAMUSCULAR; INTRAVENOUS
Status: DISCONTINUED | OUTPATIENT
Start: 2024-01-22 | End: 2024-01-22 | Stop reason: HOSPADM

## 2024-01-22 RX ORDER — GLYCOPYRROLATE 0.2 MG/ML
INJECTION INTRAMUSCULAR; INTRAVENOUS PRN
Status: DISCONTINUED | OUTPATIENT
Start: 2024-01-22 | End: 2024-01-22 | Stop reason: SDUPTHER

## 2024-01-22 RX ORDER — SUCCINYLCHOLINE CHLORIDE 20 MG/ML
INJECTION INTRAMUSCULAR; INTRAVENOUS PRN
Status: DISCONTINUED | OUTPATIENT
Start: 2024-01-22 | End: 2024-01-22 | Stop reason: SDUPTHER

## 2024-01-22 RX ORDER — METOCLOPRAMIDE HYDROCHLORIDE 5 MG/ML
10 INJECTION INTRAMUSCULAR; INTRAVENOUS
Status: COMPLETED | OUTPATIENT
Start: 2024-01-22 | End: 2024-01-22

## 2024-01-22 RX ORDER — ONDANSETRON 2 MG/ML
4 INJECTION INTRAMUSCULAR; INTRAVENOUS
Status: DISCONTINUED | OUTPATIENT
Start: 2024-01-22 | End: 2024-01-22 | Stop reason: HOSPADM

## 2024-01-22 RX ORDER — OXYCODONE HYDROCHLORIDE AND ACETAMINOPHEN 5; 325 MG/1; MG/1
1 TABLET ORAL EVERY 6 HOURS PRN
Qty: 28 TABLET | Refills: 0 | Status: SHIPPED | OUTPATIENT
Start: 2024-01-22 | End: 2024-01-29

## 2024-01-22 RX ORDER — PROPOFOL 10 MG/ML
INJECTION, EMULSION INTRAVENOUS PRN
Status: DISCONTINUED | OUTPATIENT
Start: 2024-01-22 | End: 2024-01-22 | Stop reason: SDUPTHER

## 2024-01-22 RX ORDER — FENTANYL CITRATE 50 UG/ML
100 INJECTION, SOLUTION INTRAMUSCULAR; INTRAVENOUS
Status: DISCONTINUED | OUTPATIENT
Start: 2024-01-22 | End: 2024-01-22 | Stop reason: HOSPADM

## 2024-01-22 RX ORDER — DIPHENHYDRAMINE HYDROCHLORIDE 50 MG/ML
12.5 INJECTION INTRAMUSCULAR; INTRAVENOUS
Status: DISCONTINUED | OUTPATIENT
Start: 2024-01-22 | End: 2024-01-22 | Stop reason: HOSPADM

## 2024-01-22 RX ORDER — ROCURONIUM BROMIDE 10 MG/ML
INJECTION, SOLUTION INTRAVENOUS PRN
Status: DISCONTINUED | OUTPATIENT
Start: 2024-01-22 | End: 2024-01-22 | Stop reason: SDUPTHER

## 2024-01-22 RX ORDER — BUPIVACAINE HYDROCHLORIDE 5 MG/ML
INJECTION, SOLUTION EPIDURAL; INTRACAUDAL PRN
Status: DISCONTINUED | OUTPATIENT
Start: 2024-01-22 | End: 2024-01-22 | Stop reason: ALTCHOICE

## 2024-01-22 RX ORDER — SODIUM CHLORIDE 0.9 % (FLUSH) 0.9 %
5-40 SYRINGE (ML) INJECTION EVERY 12 HOURS SCHEDULED
Status: DISCONTINUED | OUTPATIENT
Start: 2024-01-22 | End: 2024-01-22 | Stop reason: HOSPADM

## 2024-01-22 RX ORDER — HYDROMORPHONE HYDROCHLORIDE 1 MG/ML
0.5 INJECTION, SOLUTION INTRAMUSCULAR; INTRAVENOUS; SUBCUTANEOUS EVERY 10 MIN PRN
Status: DISCONTINUED | OUTPATIENT
Start: 2024-01-22 | End: 2024-01-22 | Stop reason: HOSPADM

## 2024-01-22 RX ORDER — OXYCODONE HYDROCHLORIDE 5 MG/1
5 TABLET ORAL
Status: COMPLETED | OUTPATIENT
Start: 2024-01-22 | End: 2024-01-22

## 2024-01-22 RX ORDER — ONDANSETRON 2 MG/ML
INJECTION INTRAMUSCULAR; INTRAVENOUS PRN
Status: DISCONTINUED | OUTPATIENT
Start: 2024-01-22 | End: 2024-01-22 | Stop reason: SDUPTHER

## 2024-01-22 RX ORDER — LIDOCAINE HYDROCHLORIDE 20 MG/ML
INJECTION, SOLUTION EPIDURAL; INFILTRATION; INTRACAUDAL; PERINEURAL PRN
Status: DISCONTINUED | OUTPATIENT
Start: 2024-01-22 | End: 2024-01-22 | Stop reason: SDUPTHER

## 2024-01-22 RX ORDER — FENTANYL CITRATE 50 UG/ML
25 INJECTION, SOLUTION INTRAMUSCULAR; INTRAVENOUS EVERY 5 MIN PRN
Status: COMPLETED | OUTPATIENT
Start: 2024-01-22 | End: 2024-01-22

## 2024-01-22 RX ORDER — MIDAZOLAM HYDROCHLORIDE 1 MG/ML
INJECTION INTRAMUSCULAR; INTRAVENOUS PRN
Status: DISCONTINUED | OUTPATIENT
Start: 2024-01-22 | End: 2024-01-22 | Stop reason: SDUPTHER

## 2024-01-22 RX ORDER — ALBUTEROL SULFATE 90 UG/1
AEROSOL, METERED RESPIRATORY (INHALATION) PRN
Status: DISCONTINUED | OUTPATIENT
Start: 2024-01-22 | End: 2024-01-22 | Stop reason: SDUPTHER

## 2024-01-22 RX ORDER — SCOLOPAMINE TRANSDERMAL SYSTEM 1 MG/1
1 PATCH, EXTENDED RELEASE TRANSDERMAL
Status: DISCONTINUED | OUTPATIENT
Start: 2024-01-22 | End: 2024-01-22 | Stop reason: HOSPADM

## 2024-01-22 RX ORDER — SODIUM CHLORIDE 0.9 % (FLUSH) 0.9 %
5-40 SYRINGE (ML) INJECTION PRN
Status: DISCONTINUED | OUTPATIENT
Start: 2024-01-22 | End: 2024-01-22 | Stop reason: HOSPADM

## 2024-01-22 RX ORDER — FENTANYL CITRATE 50 UG/ML
INJECTION, SOLUTION INTRAMUSCULAR; INTRAVENOUS PRN
Status: DISCONTINUED | OUTPATIENT
Start: 2024-01-22 | End: 2024-01-22 | Stop reason: SDUPTHER

## 2024-01-22 RX ORDER — NEOSTIGMINE METHYLSULFATE 1 MG/ML
INJECTION, SOLUTION INTRAVENOUS PRN
Status: DISCONTINUED | OUTPATIENT
Start: 2024-01-22 | End: 2024-01-22 | Stop reason: SDUPTHER

## 2024-01-22 RX ORDER — DEXAMETHASONE SODIUM PHOSPHATE 10 MG/ML
INJECTION INTRAMUSCULAR; INTRAVENOUS PRN
Status: DISCONTINUED | OUTPATIENT
Start: 2024-01-22 | End: 2024-01-22 | Stop reason: SDUPTHER

## 2024-01-22 RX ORDER — SODIUM CHLORIDE 9 MG/ML
INJECTION, SOLUTION INTRAVENOUS PRN
Status: DISCONTINUED | OUTPATIENT
Start: 2024-01-22 | End: 2024-01-22 | Stop reason: HOSPADM

## 2024-01-22 RX ADMIN — PROPOFOL 200 MG: 10 INJECTION, EMULSION INTRAVENOUS at 09:07

## 2024-01-22 RX ADMIN — Medication 140 MG: at 09:07

## 2024-01-22 RX ADMIN — ROCURONIUM BROMIDE 25 MG: 50 INJECTION, SOLUTION INTRAVENOUS at 09:23

## 2024-01-22 RX ADMIN — FENTANYL CITRATE 100 MCG: 50 INJECTION, SOLUTION INTRAMUSCULAR; INTRAVENOUS at 09:07

## 2024-01-22 RX ADMIN — METOCLOPRAMIDE 10 MG: 5 INJECTION, SOLUTION INTRAMUSCULAR; INTRAVENOUS at 11:25

## 2024-01-22 RX ADMIN — GLYCOPYRROLATE 0.4 MG: 0.2 INJECTION INTRAMUSCULAR; INTRAVENOUS at 10:20

## 2024-01-22 RX ADMIN — KETAMINE HYDROCHLORIDE 20 MG: 50 INJECTION, SOLUTION INTRAMUSCULAR; INTRAVENOUS at 09:07

## 2024-01-22 RX ADMIN — ROCURONIUM BROMIDE 5 MG: 50 INJECTION, SOLUTION INTRAVENOUS at 09:07

## 2024-01-22 RX ADMIN — OXYCODONE 5 MG: 5 TABLET ORAL at 11:25

## 2024-01-22 RX ADMIN — FENTANYL CITRATE 25 MCG: 50 INJECTION INTRAMUSCULAR; INTRAVENOUS at 10:51

## 2024-01-22 RX ADMIN — ALBUTEROL SULFATE 2 PUFF: 108 AEROSOL, METERED RESPIRATORY (INHALATION) at 10:35

## 2024-01-22 RX ADMIN — SODIUM CHLORIDE, SODIUM LACTATE, POTASSIUM CHLORIDE, AND CALCIUM CHLORIDE: 600; 310; 30; 20 INJECTION, SOLUTION INTRAVENOUS at 09:43

## 2024-01-22 RX ADMIN — GLYCOPYRROLATE 0.2 MG: 0.2 INJECTION INTRAMUSCULAR; INTRAVENOUS at 10:25

## 2024-01-22 RX ADMIN — PHENYLEPHRINE HYDROCHLORIDE 100 MCG: 0.1 INJECTION, SOLUTION INTRAVENOUS at 09:15

## 2024-01-22 RX ADMIN — Medication 2000 MG: at 09:14

## 2024-01-22 RX ADMIN — DEXAMETHASONE SODIUM PHOSPHATE 4 MG: 10 INJECTION INTRAMUSCULAR; INTRAVENOUS at 09:18

## 2024-01-22 RX ADMIN — MIDAZOLAM 2 MG: 1 INJECTION INTRAMUSCULAR; INTRAVENOUS at 08:59

## 2024-01-22 RX ADMIN — SODIUM CHLORIDE, SODIUM LACTATE, POTASSIUM CHLORIDE, AND CALCIUM CHLORIDE: 600; 310; 30; 20 INJECTION, SOLUTION INTRAVENOUS at 08:40

## 2024-01-22 RX ADMIN — FENTANYL CITRATE 25 MCG: 50 INJECTION INTRAMUSCULAR; INTRAVENOUS at 10:56

## 2024-01-22 RX ADMIN — Medication 1 MG: at 10:25

## 2024-01-22 RX ADMIN — PHENYLEPHRINE HYDROCHLORIDE 100 MCG: 0.1 INJECTION, SOLUTION INTRAVENOUS at 09:27

## 2024-01-22 RX ADMIN — FENTANYL CITRATE 25 MCG: 50 INJECTION INTRAMUSCULAR; INTRAVENOUS at 11:08

## 2024-01-22 RX ADMIN — ONDANSETRON 4 MG: 2 INJECTION INTRAMUSCULAR; INTRAVENOUS at 09:18

## 2024-01-22 RX ADMIN — PHENYLEPHRINE HYDROCHLORIDE 100 MCG: 0.1 INJECTION, SOLUTION INTRAVENOUS at 09:24

## 2024-01-22 RX ADMIN — FENTANYL CITRATE 25 MCG: 50 INJECTION INTRAMUSCULAR; INTRAVENOUS at 11:15

## 2024-01-22 RX ADMIN — LIDOCAINE HYDROCHLORIDE 100 MG: 20 INJECTION, SOLUTION EPIDURAL; INFILTRATION; INTRACAUDAL; PERINEURAL at 09:07

## 2024-01-22 RX ADMIN — Medication 3 MG: at 10:20

## 2024-01-22 ASSESSMENT — PAIN DESCRIPTION - LOCATION: LOCATION: ABDOMEN

## 2024-01-22 ASSESSMENT — PAIN DESCRIPTION - DESCRIPTORS
DESCRIPTORS: CRAMPING
DESCRIPTORS: CRAMPING

## 2024-01-22 ASSESSMENT — PAIN - FUNCTIONAL ASSESSMENT
PAIN_FUNCTIONAL_ASSESSMENT: 0-10
PAIN_FUNCTIONAL_ASSESSMENT: 0-10

## 2024-01-22 ASSESSMENT — PAIN SCALES - GENERAL
PAINLEVEL_OUTOF10: 6
PAINLEVEL_OUTOF10: 2
PAINLEVEL_OUTOF10: 5
PAINLEVEL_OUTOF10: 6
PAINLEVEL_OUTOF10: 7

## 2024-01-22 ASSESSMENT — PAIN DESCRIPTION - ORIENTATION: ORIENTATION: MID

## 2024-01-22 NOTE — ANESTHESIA POSTPROCEDURE EVALUATION
Department of Anesthesiology  Postprocedure Note    Patient: Glenda Scruggs  MRN: [id number]  YOB: 1965  Date of evaluation: 1/22/2024    Procedure Summary       Date: 01/22/24 Room / Location: [facility] MAIN OR [address] [place] [address] MAIN OR    Anesthesia Start: 0847 Anesthesia Stop: 1045    Procedure: HERNIA INGUINAL REPAIR LAPAROSCOPIC ROBOTIC (Bilateral: Groin) Diagnosis:       Bilateral inguinal hernia      (Bilateral inguinal hernia [K40.20])    Surgeons: Cherelle Kennedy MD Responsible Provider: David Carlos MD    Anesthesia Type: General ASA Status: 2            Anesthesia Type: General    Stephan Phase I: Stephan Score: 9    Stephan Phase II:      Anesthesia Post Evaluation    Patient location during evaluation: PACU  Patient participation: complete - patient participated  Level of consciousness: awake and awake and alert  Airway patency: patent  Nausea & Vomiting: no nausea  Cardiovascular status: hemodynamically stable  Respiratory status: acceptable  Hydration status: euvolemic  Multimodal analgesia pain management approach  Pain management: adequate    No notable events documented.

## 2024-01-22 NOTE — INTERVAL H&P NOTE
Update History & Physical    The patient's History and Physical of January 10, robotic BIHR with mesh was reviewed with the patient and I examined the patient. There was no change. The surgical site was confirmed by the patient and me.     Plan: The risks, benefits, expected outcome, and alternative to the recommended procedure have been discussed with the patient. Patient understands and wants to proceed with the procedure.     Electronically signed by Cherelle Martinez MD on 1/22/2024 at 9:04 AM

## 2024-01-22 NOTE — ANESTHESIA PROCEDURE NOTES
Airway  Date/Time: 1/22/2024 9:09 AM  Urgency: elective    Airway not difficult    General Information and Staff    Patient location during procedure: OR  Performed: resident/CRNA   Performed by: Latoya Flores APRN - CRNA  Authorized by: David Carlos MD      Indications and Patient Condition  Indications for airway management: anesthesia and airway protection  Spontaneous Ventilation: absent  Sedation level: deep  Preoxygenated: yes  Patient position: sniffing  MILS not maintained throughout  Mask difficulty assessment: not attempted    Final Airway Details  Final airway type: endotracheal airway      Successful airway: ETT  Cuffed: yes   Successful intubation technique: direct laryngoscopy  Facilitating devices/methods: intubating stylet  Endotracheal tube insertion site: oral  Blade: Carlos  Blade size: #3  ETT size (mm): 7.0  Cormack-Lehane Classification: grade I - full view of glottis  Placement verified by: chest auscultation and capnometry   Measured from: lips  ETT to lips (cm): 22  Number of attempts at approach: 1    no

## 2024-01-22 NOTE — OP NOTE
Operative Note      Patient: Glenda Scruggs  YOB: 1965  MRN: [id number]    Date of Procedure: 1/22/2024    Pre-Op Diagnosis: Bilateral inguinal hernia [K40.20]    Post-Op Diagnosis: Same       Procedure(s):  HERNIA INGUINAL REPAIR LAPAROSCOPIC ROBOTIC    Surgeon(s):  Cherelle Kennedy MD    Assistant:   * No surgical staff found *    Anesthesia: General    Estimated Blood Loss (mL): Minimal    Complications: None    Specimens:   * No specimens in log *    Implants:  Implant Name Type Inv. Item Serial No.  Lot No. LRB No. Used Action   MESH KHANG [id number] TEXTILE MFIL POLYETH TEREPHTHALATE - [id number]  MESH KHANG [id number] TEXTILE MFIL POLYETH TEREPHTHALATE  [employer]  SURGICAL-WD [id number] Right 1 Implanted   MESH KHANG LAP SELF FIXATING LT [place] POLYLACTIC ACID PROGRIP - [id number]  MESH KHANG LAP SELF FIXATING LT [place] POLYLACTIC ACID PROGRIP  [employer] COVID[employer]EN  SURGICAL-WD [id number] Left 1 Implanted         Drains: * No LDAs found *    Findings:  Bilateral inguinal hernias - left indirect and femoral and right direct hernias. 10 x 15 cm self fixating Progrip mesh prosthetic implanted into Bilateral groin and fixated.     Detailed Description of Procedure:  After informed consent was taken, patient was taken to the operating room and placed in supine position. Anesthesia was induced and patient was intubated. Patient received preoperative antibiotics. Patient voided on call to OR. The abdomen, groin and upper thighs were prepped and draped in standard sterile fashion. A timeout was performed with all team members present.    A 1 cm vertical incision was made in the supraumbilical area. Dissection was carried with electrocautery to the fascia. The fascia was elevated and a Veress needle was inserted. Saline drop test was normal. The abdomen was insufflated with carbon dioxide to a pressure of 15 mmHg and the patient tolerated insufflation well. The abdomen was

## 2024-01-22 NOTE — ANESTHESIA PRE PROCEDURE
Department of Anesthesiology  Preprocedure Note       Name:  Glenda Scruggs   Age:  58 y.o.  :  1965                                          MRN:  [id number]         Date:  2024      Surgeon: Surgeon(s):  Cherelle Kennedy MD    Procedure: Procedure(s):  HERNIA INGUINAL REPAIR LAPAROSCOPIC ROBOTIC    Medications prior to admission:   Prior to Admission medications    Medication Sig Start Date End Date Taking? Authorizing Provider   oxyCODONE-acetaminophen (PERCOCET) 5-325 MG per tablet Take 1 tablet by mouth every 6 hours as needed for Pain for up to 7 days. Intended supply: 7 days. Take lowest dose possible to manage pain Max Daily Amount: 4 tablets 24 Yes Cherelle Kennedy MD   Bacillus Coagulans-Inulin (PROBIOTIC) 1-250 BILLION-MG CAPS Take by mouth daily    Provider, MD Honorio   ibuprofen (ADVIL;MOTRIN) 600 MG tablet Take 1 tablet by mouth 3 times daily as needed for Pain 23   Veronica Hernandez MD   levothyroxine (SYNTHROID) 50 MCG tablet Take 1 tablet by mouth every morning (before breakfast) 23   Marianela Bailey PA   famotidine (PEPCID) 20 MG tablet Take 1 tablet by mouth daily as needed (acid reflux) 23   Marianela Bailey PA   lisinopril (PRINIVIL;ZESTRIL) 5 MG tablet Take 1 tablet by mouth daily TAKE ONE TABLET BY MOUTH ONE TIME DAILY AT NIGHT 23   Marianela Bailey PA   metFORMIN (GLUCOPHAGE) 500 MG tablet Take 1 tablet by mouth daily TAKE ONE TABLET BY MOUTH ONE TIME DAILY AT NIGHT 23   Marianela Bailey PA   rosuvastatin (CRESTOR) 5 MG tablet Take 1 tablet by mouth daily 23   Marianela Bailey PA   Semaglutide, 1 MG/DOSE, (OZEMPIC, 1 MG/DOSE,) 4 MG/3ML SOPN Inject 1 mg into the skin once a week 23   Marianela Bailey PA   vilazodone HCl (VIIBRYD) 40 MG TABS Take 1 tablet by mouth daily 10/16/23 4/13/24  Hector Marx MD   Rhubarb (ESTROVEN COMPLETE PO) Take 1 
MD   1/22/2024

## 2024-01-22 NOTE — PERIOP NOTE
Friend and aunt in [place] may talk to them but she doesn't know their cell #    ADDED: aunt Mimi here. Cell # [phone].

## 2024-01-22 NOTE — ANESTHESIA POSTPROCEDURE EVALUATION
Department of Anesthesiology  Postprocedure Note    Patient: Glenda Scruggs  MRN: [id number]  YOB: 1965  Date of evaluation: 1/22/2024    Procedure Summary       Date: 01/22/24 Room / [place] [facility]  / [facility]    Anesthesia Start: 0847 Anesthesia Stop:     Procedure: HERNIA INGUINAL REPAIR LAPAROSCOPIC ROBOTIC (Bilateral: Groin) Diagnosis:       Bilateral inguinal hernia      (Bilateral inguinal hernia [K40.20])    Surgeons: Cherelle Kennedy MD Responsible Provider: David Carlos MD    Anesthesia Type: general ASA Status: 3            Anesthesia Type: No value filed.    Stephan Phase I:      Stephan Phase II:      Anesthesia Post Evaluation    Patient location during evaluation: PACU  Patient participation: complete - patient participated  Level of consciousness: awake and awake and alert  Airway patency: patent  Nausea & Vomiting: no nausea  Cardiovascular status: hemodynamically stable  Respiratory status: acceptable  Hydration status: euvolemic  Multimodal analgesia pain management approach  Pain management: adequate    No notable events documented.

## 2024-01-22 NOTE — DISCHARGE INSTRUCTIONS
No bathtub or pool for 2 weeks. Ok to shower.  No weight lifting greater than 20 lbs for 4 weeks.      Leave the Steri strips or skin glue in place. They will fall off on their own in 7-10 days.    If not already scheduled, please call the office and schedule a 2 week postoperative follow up.    Take tylenol or ibuprofen (if not allergic) as needed for mild pain every 4-6 hours.   Percocet prescribed for mod to severe pain. This is a narcotic and can cause drowsiness, nausea and vomiting and constipation.  Take Colace 100mg BID (over the counter) if constipated.     MEDICATION INTERACTION:    During your procedure you potentially received a medication or medications which may reduce the effectiveness of oral contraceptives. Please consider other forms of contraception for 1 month following your procedure if you are currently using oral contraceptives as your primary form of birth control. In addition to this, we recommend continuing your oral contraceptive as prescribed, unless otherwise instructed by your physician, during this time.    After general anesthesia or intravenous sedation, for 24 hours or while taking prescription Narcotics:  Limit your activities  A responsible adult needs to be with you for the next 24 hours  Do not drive and operate hazardous machinery  Do not make important personal or business decisions  Do not drink alcoholic beverages  If you have not urinated within 8 hours after discharge, and you are experiencing discomfort from urinary retention, please go to the nearest ED.  If you have sleep apnea and have a CPAP machine, please use it for all naps and sleeping.  Please use caution when taking narcotics and any of your home medications that may cause drowsiness.  *  Please give a list of your current medications to your Primary Care Provider.  *  Please update this list whenever your medications are discontinued, doses are      changed, or new medications (including over-the-counter

## 2024-01-25 NOTE — PROGRESS NOTES
[address]   [facility] [address]  [phone]        Name: Glenda Scruggs      MRN: [id number]       : 1965             PCP: Marianela Bailey PA       CC:    Chief Complaint   Patient presents with    Post-Op Check     Post op 24 [facility])       HPI:  .Glenda Scruggs is a 58 y.o. year-old female who presents for a post-op visit s/p laparoscopic robotic assisted bilateral inguinal hernia repair done per Dr. Horne on 2024.     Findings:  Bilateral inguinal hernias - left indirect and femoral and right direct hernias. 10 x 15 cm self fixating Progrip mesh prosthetic implanted into Bilateral groin and fixated.        ==> Patient reports she is doing well, tolerating a regular diet, voiding without complications and having normal bowel movements.  She reports mild muscular type pain in right inguinal area but no hernia appreciated on exam.         Objective:   Review of Systems   Constitutional:  Negative for chills and fever.   Respiratory:  Negative for cough, choking and shortness of breath.    Cardiovascular: Negative.  Negative for chest pain and palpitations.   Gastrointestinal: Negative.  Negative for abdominal distention, abdominal pain, constipation, diarrhea, nausea and vomiting.   Genitourinary: Negative.  Negative for difficulty urinating, dysuria and hematuria.         Physical Exam  Constitutional:       Appearance: Normal appearance.   Abdominal:      General: Bowel sounds are normal.      Palpations: Abdomen is soft.      Comments: Trochar sites approximated with no erythema or drainage     No recurrent hernias noted.    Musculoskeletal:         General: Normal range of motion.   Skin:     General: Skin is warm.      Capillary Refill: Capillary refill takes less than 2 seconds.   Neurological:      General: No focal deficit present.      Mental Status: She is alert.   Psychiatric:         Mood and Affect: Mood normal.         Behavior:

## 2024-02-06 ENCOUNTER — OFFICE VISIT (OUTPATIENT)
Dept: SURGERY | Age: 59
End: 2024-02-06

## 2024-02-06 DIAGNOSIS — Z09 POSTOPERATIVE EXAMINATION: Primary | ICD-10-CM

## 2024-02-06 PROCEDURE — 99024 POSTOP FOLLOW-UP VISIT: CPT | Performed by: NURSE PRACTITIONER

## 2024-02-10 ENCOUNTER — HOSPITAL ENCOUNTER (OUTPATIENT)
Dept: MAMMOGRAPHY | Age: 59
End: 2024-02-10
Payer: COMMERCIAL

## 2024-02-10 DIAGNOSIS — Z12.31 VISIT FOR SCREENING MAMMOGRAM: ICD-10-CM

## 2024-02-10 PROCEDURE — 77067 SCR MAMMO BI INCL CAD: CPT

## 2024-02-16 ENCOUNTER — OFFICE VISIT (OUTPATIENT)
Dept: BEHAVIORAL/MENTAL HEALTH CLINIC | Age: 59
End: 2024-02-16
Payer: COMMERCIAL

## 2024-02-16 VITALS
TEMPERATURE: 98.6 F | OXYGEN SATURATION: 98 % | SYSTOLIC BLOOD PRESSURE: 110 MMHG | DIASTOLIC BLOOD PRESSURE: 78 MMHG | HEART RATE: 81 BPM

## 2024-02-16 DIAGNOSIS — F33.42 MDD (MAJOR DEPRESSIVE DISORDER), RECURRENT, IN FULL REMISSION (HCC): Primary | ICD-10-CM

## 2024-02-16 PROCEDURE — G8427 DOCREV CUR MEDS BY ELIG CLIN: HCPCS | Performed by: STUDENT IN AN ORGANIZED HEALTH CARE EDUCATION/TRAINING PROGRAM

## 2024-02-16 PROCEDURE — 99213 OFFICE O/P EST LOW 20 MIN: CPT | Performed by: STUDENT IN AN ORGANIZED HEALTH CARE EDUCATION/TRAINING PROGRAM

## 2024-02-16 PROCEDURE — G8417 CALC BMI ABV UP PARAM F/U: HCPCS | Performed by: STUDENT IN AN ORGANIZED HEALTH CARE EDUCATION/TRAINING PROGRAM

## 2024-02-16 PROCEDURE — 3017F COLORECTAL CA SCREEN DOC REV: CPT | Performed by: STUDENT IN AN ORGANIZED HEALTH CARE EDUCATION/TRAINING PROGRAM

## 2024-02-16 PROCEDURE — G8484 FLU IMMUNIZE NO ADMIN: HCPCS | Performed by: STUDENT IN AN ORGANIZED HEALTH CARE EDUCATION/TRAINING PROGRAM

## 2024-02-16 PROCEDURE — 1036F TOBACCO NON-USER: CPT | Performed by: STUDENT IN AN ORGANIZED HEALTH CARE EDUCATION/TRAINING PROGRAM

## 2024-02-16 RX ORDER — VILAZODONE HYDROCHLORIDE 40 MG/1
40 TABLET ORAL DAILY
Qty: 90 TABLET | Refills: 1 | Status: SHIPPED | OUTPATIENT
Start: 2024-02-16 | End: 2024-08-14

## 2024-02-16 NOTE — PROGRESS NOTES
and Intact short-term    Attention/Concentration: Sustained    Fund of knowledge: fair    Judgement: good    Insight: fair         Questionnaire Findings:    PHQ2: 5 (2/16/24)    GAD7: 5 (6/22/23)        Assessment/Summary of Findings:    Glenda Scruggs is a 58 y.o. female with reported prior psychiatric history significant for recurrent depression who presents for medication management. They are currently prescribed: Viibryd 40 mg daily.     Pt reports that she has continued to do well and has no significant concerns at this time. Reports compliance and denies significant SE. Denies SI/HI, A/VH, paranoia or delusions. Remains engaged with Roberto for therapy. Discussed maintaining regimen and will f/u in 6 mo.      Diagnosis:    MDD, recurrent, in full remission      Plan:    Glenda Scruggs will receive medication management at [facility].    Medication Recommendations:    - Continue Viibryd 40 mg daily; will continue to monitor for efficacy and tolerability    - Medication side effect profiles, risks, and benefits were discussed with the patient.    - Patient encouraged to contact the clinic if experiencing any adverse reactions with medications.      Other Recommendations:    - established with Roberto for therapy    - If patient has any concerns for their own safety due to adverse reactions to medications, suicidal or homicidal ideations, auditory or visual hallucinations, or delusions, they have been told to call 911 or go to the nearest emergency department.    RTC: 6 mo      Hector Marx MD    2/16/2024 8:28 AM    [facility]

## 2024-02-29 ENCOUNTER — TELEPHONE (OUTPATIENT)
Dept: BEHAVIORAL/MENTAL HEALTH CLINIC | Age: 59
End: 2024-02-29

## 2024-02-29 DIAGNOSIS — F33.42 MDD (MAJOR DEPRESSIVE DISORDER), RECURRENT, IN FULL REMISSION (HCC): ICD-10-CM

## 2024-02-29 RX ORDER — VILAZODONE HYDROCHLORIDE 40 MG/1
40 TABLET ORAL DAILY
Qty: 90 TABLET | Refills: 1 | Status: SHIPPED | OUTPATIENT
Start: 2024-02-29 | End: 2024-08-27

## 2024-02-29 NOTE — TELEPHONE ENCOUNTER
Patient called and states the pharmacy sent her a email they never received the rx for the viibryd if you could please resend it per patient. Thank you

## 2024-03-07 NOTE — PROGRESS NOTES
patient be scheduled for home sleep study for 2 nights.  If this indicate she has a sleep apnea will consider treatment modality with either CPAP or dental appliance or inspire device based on the severity of the sleep apnea    Proper sleep hygiene are strongly recommended and will be provided with appropriate instruction for that    Patient will be given sleep diary    Positional therapy is also recommended    Will start Sonata 5 mg nightly    Appropriate instruction regarding insomnia will be provided to the patient as well    Maintain her follow-up with the primary care    Return to the sleep center in 4 months or sooner if needed    All questions and concerns were addressed properly    Orders Placed This Encounter   Procedures    Ambulatory Referral to Sleep Studies     Referral Priority:   Urgent     Referral Type:   Consult for Advice and Opinion     Referral Reason:   Specialty Services Required     Number of Visits Requested:   1               Orders Placed This Encounter   Medications    zaleplon (SONATA) 5 MG capsule     Sig: Take 1 capsule by mouth nightly for 120 days. Max Daily Amount: 5 mg     Dispense:  30 capsule     Refill:  3         Over 50% of today's office visit was spent in face to face time reviewing test results, prognosis, importance of compliance, education about disease process, benefits of medications, instructions for management of acute flare-ups, and follow up plans.  Total face to face time spent with patient and charting was 45 minutes.    Sarah Briones MD  Electronically signed

## 2024-03-11 ENCOUNTER — OFFICE VISIT (OUTPATIENT)
Dept: SLEEP MEDICINE | Age: 59
End: 2024-03-11
Payer: COMMERCIAL

## 2024-03-11 VITALS
TEMPERATURE: 98 F | WEIGHT: 201 LBS | BODY MASS INDEX: 36.99 KG/M2 | DIASTOLIC BLOOD PRESSURE: 88 MMHG | RESPIRATION RATE: 14 BRPM | SYSTOLIC BLOOD PRESSURE: 142 MMHG | HEART RATE: 72 BPM | OXYGEN SATURATION: 96 % | HEIGHT: 62 IN

## 2024-03-11 DIAGNOSIS — G47.8 NON-RESTORATIVE SLEEP: ICD-10-CM

## 2024-03-11 DIAGNOSIS — G47.00 PERSISTENT DISORDER OF INITIATING OR MAINTAINING SLEEP: Primary | ICD-10-CM

## 2024-03-11 DIAGNOSIS — R06.83 SNORING: ICD-10-CM

## 2024-03-11 DIAGNOSIS — Z91.89 AT RISK FOR SLEEP APNEA: ICD-10-CM

## 2024-03-11 PROCEDURE — G8417 CALC BMI ABV UP PARAM F/U: HCPCS | Performed by: INTERNAL MEDICINE

## 2024-03-11 PROCEDURE — G8484 FLU IMMUNIZE NO ADMIN: HCPCS | Performed by: INTERNAL MEDICINE

## 2024-03-11 PROCEDURE — 1036F TOBACCO NON-USER: CPT | Performed by: INTERNAL MEDICINE

## 2024-03-11 PROCEDURE — 3017F COLORECTAL CA SCREEN DOC REV: CPT | Performed by: INTERNAL MEDICINE

## 2024-03-11 PROCEDURE — 99204 OFFICE O/P NEW MOD 45 MIN: CPT | Performed by: INTERNAL MEDICINE

## 2024-03-11 PROCEDURE — G8427 DOCREV CUR MEDS BY ELIG CLIN: HCPCS | Performed by: INTERNAL MEDICINE

## 2024-03-11 RX ORDER — ZALEPLON 5 MG/1
5 CAPSULE ORAL NIGHTLY
Qty: 30 CAPSULE | Refills: 3 | Status: SHIPPED | OUTPATIENT
Start: 2024-03-11 | End: 2024-07-09

## 2024-03-11 NOTE — PATIENT INSTRUCTIONS
your throat muscles and tongue relax during sleep.  You can treat sleep apnea at home by making lifestyle changes. You also can use a CPAP breathing machine that keeps tissues in the throat from blocking your airway. Or your doctor may suggest that you use a breathing device while you sleep. It helps keep your airway open. This could be a device that you put in your mouth. Other examples include strips or disks that you use on your nose. In some cases, surgery may be needed to remove enlarged tissues in the throat.  Follow-up care is a key part of your treatment and safety. Be sure to make and go to all appointments, and call your doctor if you are having problems. It's also a good idea to know your test results and keep a list of the medicines you take.  How can you care for yourself at home?  Lose weight, if needed. It may reduce the number of times you stop breathing or have slowed breathing.  Sleep on your side. It may stop mild apnea. If you tend to roll onto your back, sew a pocket in the back of your pajama top. Put a tennis ball into the pocket, and stitch the pocket shut. This will help keep you from sleeping on your back.  Avoid alcohol and medicines such as sleeping pills and sedatives before bed.  Do not smoke. Smoking can make sleep apnea worse. If you need help quitting, talk to your doctor about stop-smoking programs and medicines. These can increase your chances of quitting for good.  Prop up the head of your bed 4 to 6 inches by putting bricks under the legs of the bed.  Treat breathing problems, such as a stuffy nose, caused by a cold or allergies.  Try a continuous positive airway pressure (CPAP) breathing machine if your doctor recommends it. The machine keeps your airway open when you sleep.  If CPAP does not work for you, ask your doctor if you can try other breathing machines. A bilevel positive airway pressure machine uses one type of air pressure for breathing in and another type for

## 2024-03-27 ENCOUNTER — HOSPITAL ENCOUNTER (OUTPATIENT)
Dept: SLEEP CENTER | Age: 59
Discharge: HOME | End: 2024-03-30
Payer: COMMERCIAL

## 2024-03-27 PROCEDURE — 95806 SLEEP STUDY UNATT&RESP EFFT: CPT

## 2024-05-08 ENCOUNTER — NURSE ONLY (OUTPATIENT)
Dept: FAMILY MEDICINE CLINIC | Facility: CLINIC | Age: 59
End: 2024-05-08

## 2024-05-08 DIAGNOSIS — E11.9 CONTROLLED TYPE 2 DIABETES MELLITUS WITHOUT COMPLICATION, WITHOUT LONG-TERM CURRENT USE OF INSULIN (HCC): ICD-10-CM

## 2024-05-08 DIAGNOSIS — E78.5 DYSLIPIDEMIA: ICD-10-CM

## 2024-05-08 LAB
ALBUMIN SERPL-MCNC: 4 G/DL (ref 3.5–5)
ALBUMIN/GLOB SERPL: 1.3 (ref 1–1.9)
ALP SERPL-CCNC: 98 U/L (ref 35–104)
ALT SERPL-CCNC: 14 U/L (ref 12–65)
ANION GAP SERPL CALC-SCNC: 11 MMOL/L (ref 9–18)
AST SERPL-CCNC: 21 U/L (ref 15–37)
BILIRUB SERPL-MCNC: 0.3 MG/DL (ref 0–1.2)
BUN SERPL-MCNC: 16 MG/DL (ref 6–23)
CALCIUM SERPL-MCNC: 9.5 MG/DL (ref 8.8–10.2)
CHLORIDE SERPL-SCNC: 103 MMOL/L (ref 98–107)
CHOLEST SERPL-MCNC: 104 MG/DL (ref 0–200)
CO2 SERPL-SCNC: 26 MMOL/L (ref 20–28)
CREAT SERPL-MCNC: 0.93 MG/DL (ref 0.6–1.1)
EST. AVERAGE GLUCOSE BLD GHB EST-MCNC: 153 MG/DL
GLOBULIN SER CALC-MCNC: 3.1 G/DL (ref 2.3–3.5)
GLUCOSE SERPL-MCNC: 133 MG/DL (ref 70–99)
HBA1C MFR BLD: 7 % (ref 0–5.6)
HDLC SERPL-MCNC: 40 MG/DL (ref 40–60)
HDLC SERPL: 2.6 (ref 0–5)
LDLC SERPL CALC-MCNC: 40 MG/DL (ref 0–100)
POTASSIUM SERPL-SCNC: 4.4 MMOL/L (ref 3.5–5.1)
PROT SERPL-MCNC: 7.1 G/DL (ref 6.3–8.2)
SODIUM SERPL-SCNC: 140 MMOL/L (ref 136–145)
TRIGL SERPL-MCNC: 117 MG/DL (ref 0–150)
VLDLC SERPL CALC-MCNC: 23 MG/DL (ref 6–23)

## 2024-05-09 ENCOUNTER — TELEPHONE (OUTPATIENT)
Dept: SLEEP MEDICINE | Age: 59
End: 2024-05-09

## 2024-05-09 DIAGNOSIS — G47.33 OSA (OBSTRUCTIVE SLEEP APNEA): Primary | ICD-10-CM

## 2024-05-09 NOTE — TELEPHONE ENCOUNTER
Patient had recent sleep study showing moderate sleep apnea. Cpap therapy is recommended per sleep interp.  Patient has agreed to start CPAP therapy. Order sent to [facility].     TAHIRA PURCELL RCP

## 2024-05-13 PROBLEM — Z91.89 AT RISK FOR SLEEP APNEA: Status: RESOLVED | Noted: 2024-03-11 | Resolved: 2024-05-13

## 2024-05-13 PROBLEM — F41.1 GENERALIZED ANXIETY DISORDER: Status: RESOLVED | Noted: 2023-07-26 | Resolved: 2024-05-13

## 2024-05-13 PROBLEM — R06.83 SNORING: Status: RESOLVED | Noted: 2024-03-11 | Resolved: 2024-05-13

## 2024-05-13 PROBLEM — G47.33 OSA ON CPAP: Status: ACTIVE | Noted: 2024-05-13

## 2024-05-13 PROBLEM — G47.8 NON-RESTORATIVE SLEEP: Status: RESOLVED | Noted: 2024-03-11 | Resolved: 2024-05-13

## 2024-05-13 NOTE — PROGRESS NOTES
Glenda Scruggs (: 1965) is a 58 y.o. female, an established patient, is here for evaluation of the following chief complaint(s):  Chief Complaint   Patient presents with    Hypertension    Diabetes    Results    Brain fog          ASSESSMENT/PLAN:  Glenda was seen today for hypertension, diabetes, results and brain fog.    Diagnoses and all orders for this visit:    Hot flashes    Controlled type 2 diabetes mellitus without complication, without long-term current use of insulin ([facility])  -     Microalbumin / Creatinine Urine Ratio  -     empagliflozin (JARDIANCE) 10 MG tablet; Take 1 tablet by mouth daily  -     [facility] Diabetic Education  -     lisinopril (PRINIVIL;ZESTRIL) 5 MG tablet; Take 1 tablet by mouth daily TAKE ONE TABLET BY MOUTH ONE TIME DAILY AT NIGHT  -     metFORMIN (GLUCOPHAGE) 500 MG tablet; Take 1 tablet by mouth daily TAKE ONE TABLET BY MOUTH ONE TIME DAILY AT NIGHT  -     Semaglutide, 1 MG/DOSE, (OZEMPIC, 1 MG/DOSE,) 4 MG/3ML SOPN sc injection; Inject 1 mg into the skin once a week    Hypothyroidism, adult    Dyslipidemia  -     rosuvastatin (CRESTOR) 5 MG tablet; Take 1 tablet by mouth daily    CRICKET (obstructive sleep apnea)    Other speech disturbance  -     CBC with Auto Differential; Future  -     Comprehensive Metabolic Panel; Future  -     Vitamin B12; Future  -     TSH; Future    Stumbling gait  -     CBC with Auto Differential; Future  -     Comprehensive Metabolic Panel; Future  -     Vitamin B12; Future  -     TSH; Future    Gastroesophageal reflux disease, unspecified whether esophagitis present  -     famotidine (PEPCID) 20 MG tablet; Take 1 tablet by mouth daily as needed (acid reflux)    I reviewed recent lab results w/  MsJulee Scruggs.      Diabetes is well controlled.  Currently prescribed:   Diabetic Medications       Biguanides       metFORMIN (GLUCOPHAGE) 500 MG tablet Take 1 tablet by mouth daily TAKE ONE TABLET BY MOUTH ONE TIME DAILY AT NIGHT       Incretin

## 2024-05-15 ENCOUNTER — TELEPHONE (OUTPATIENT)
Dept: DIABETES SERVICES | Age: 59
End: 2024-05-15

## 2024-05-15 ENCOUNTER — OFFICE VISIT (OUTPATIENT)
Dept: FAMILY MEDICINE CLINIC | Facility: CLINIC | Age: 59
End: 2024-05-15
Payer: COMMERCIAL

## 2024-05-15 VITALS
WEIGHT: 199 LBS | OXYGEN SATURATION: 97 % | RESPIRATION RATE: 16 BRPM | DIASTOLIC BLOOD PRESSURE: 68 MMHG | SYSTOLIC BLOOD PRESSURE: 104 MMHG | BODY MASS INDEX: 36.62 KG/M2 | TEMPERATURE: 98.9 F | HEIGHT: 62 IN | HEART RATE: 87 BPM

## 2024-05-15 DIAGNOSIS — R23.2 HOT FLASHES: Primary | ICD-10-CM

## 2024-05-15 DIAGNOSIS — E03.9 HYPOTHYROIDISM, ADULT: ICD-10-CM

## 2024-05-15 DIAGNOSIS — R26.89 STUMBLING GAIT: ICD-10-CM

## 2024-05-15 DIAGNOSIS — K21.9 GASTROESOPHAGEAL REFLUX DISEASE, UNSPECIFIED WHETHER ESOPHAGITIS PRESENT: ICD-10-CM

## 2024-05-15 DIAGNOSIS — E78.5 DYSLIPIDEMIA: ICD-10-CM

## 2024-05-15 DIAGNOSIS — E11.9 CONTROLLED TYPE 2 DIABETES MELLITUS WITHOUT COMPLICATION, WITHOUT LONG-TERM CURRENT USE OF INSULIN (HCC): ICD-10-CM

## 2024-05-15 DIAGNOSIS — G47.33 OSA (OBSTRUCTIVE SLEEP APNEA): ICD-10-CM

## 2024-05-15 DIAGNOSIS — R47.89 OTHER SPEECH DISTURBANCE: ICD-10-CM

## 2024-05-15 LAB
ALBUMIN SERPL-MCNC: 3.9 G/DL (ref 3.5–5)
ALBUMIN/GLOB SERPL: 1.2 (ref 1–1.9)
ALP SERPL-CCNC: 110 U/L (ref 35–104)
ALT SERPL-CCNC: 14 U/L (ref 12–65)
ANION GAP SERPL CALC-SCNC: 13 MMOL/L (ref 9–18)
AST SERPL-CCNC: 22 U/L (ref 15–37)
BASOPHILS # BLD: 0 K/UL (ref 0–0.2)
BASOPHILS NFR BLD: 0 % (ref 0–2)
BILIRUB SERPL-MCNC: 0.3 MG/DL (ref 0–1.2)
BUN SERPL-MCNC: 18 MG/DL (ref 6–23)
CALCIUM SERPL-MCNC: 9.7 MG/DL (ref 8.8–10.2)
CHLORIDE SERPL-SCNC: 101 MMOL/L (ref 98–107)
CO2 SERPL-SCNC: 24 MMOL/L (ref 20–28)
CREAT SERPL-MCNC: 0.97 MG/DL (ref 0.6–1.1)
CREAT UR-MCNC: 226 MG/DL (ref 28–217)
DIFFERENTIAL METHOD BLD: ABNORMAL
EOSINOPHIL # BLD: 0.2 K/UL (ref 0–0.8)
EOSINOPHIL NFR BLD: 2 % (ref 0.5–7.8)
ERYTHROCYTE [DISTWIDTH] IN BLOOD BY AUTOMATED COUNT: 13.9 % (ref 11.9–14.6)
GLOBULIN SER CALC-MCNC: 3.4 G/DL (ref 2.3–3.5)
GLUCOSE SERPL-MCNC: 126 MG/DL (ref 70–99)
HCT VFR BLD AUTO: 44.6 % (ref 35.8–46.3)
HGB BLD-MCNC: 14.2 G/DL (ref 11.7–15.4)
IMM GRANULOCYTES # BLD AUTO: 0 K/UL (ref 0–0.5)
IMM GRANULOCYTES NFR BLD AUTO: 0 % (ref 0–5)
LYMPHOCYTES # BLD: 1.6 K/UL (ref 0.5–4.6)
LYMPHOCYTES NFR BLD: 21 % (ref 13–44)
MCH RBC QN AUTO: 26.4 PG (ref 26.1–32.9)
MCHC RBC AUTO-ENTMCNC: 31.8 G/DL (ref 31.4–35)
MCV RBC AUTO: 82.9 FL (ref 82–102)
MICROALBUMIN UR-MCNC: <1.2 MG/DL (ref 0–20)
MICROALBUMIN/CREAT UR-RTO: ABNORMAL MG/G (ref 0–30)
MONOCYTES # BLD: 0.6 K/UL (ref 0.1–1.3)
MONOCYTES NFR BLD: 8 % (ref 4–12)
NEUTS SEG # BLD: 5.2 K/UL (ref 1.7–8.2)
NEUTS SEG NFR BLD: 69 % (ref 43–78)
NRBC # BLD: 0 K/UL (ref 0–0.2)
PLATELET # BLD AUTO: 362 K/UL (ref 150–450)
PMV BLD AUTO: 9.7 FL (ref 9.4–12.3)
POTASSIUM SERPL-SCNC: 4.5 MMOL/L (ref 3.5–5.1)
PROT SERPL-MCNC: 7.3 G/DL (ref 6.3–8.2)
RBC # BLD AUTO: 5.38 M/UL (ref 4.05–5.2)
SODIUM SERPL-SCNC: 138 MMOL/L (ref 136–145)
TSH, 3RD GENERATION: 1.9 UIU/ML (ref 0.27–4.2)
VIT B12 SERPL-MCNC: 1342 PG/ML (ref 193–986)
WBC # BLD AUTO: 7.5 K/UL (ref 4.3–11.1)

## 2024-05-15 PROCEDURE — 1036F TOBACCO NON-USER: CPT | Performed by: PHYSICIAN ASSISTANT

## 2024-05-15 PROCEDURE — 2022F DILAT RTA XM EVC RTNOPTHY: CPT | Performed by: PHYSICIAN ASSISTANT

## 2024-05-15 PROCEDURE — 99214 OFFICE O/P EST MOD 30 MIN: CPT | Performed by: PHYSICIAN ASSISTANT

## 2024-05-15 PROCEDURE — 3017F COLORECTAL CA SCREEN DOC REV: CPT | Performed by: PHYSICIAN ASSISTANT

## 2024-05-15 PROCEDURE — G8417 CALC BMI ABV UP PARAM F/U: HCPCS | Performed by: PHYSICIAN ASSISTANT

## 2024-05-15 PROCEDURE — G8427 DOCREV CUR MEDS BY ELIG CLIN: HCPCS | Performed by: PHYSICIAN ASSISTANT

## 2024-05-15 PROCEDURE — 3051F HG A1C>EQUAL 7.0%<8.0%: CPT | Performed by: PHYSICIAN ASSISTANT

## 2024-05-15 RX ORDER — SEMAGLUTIDE 1.34 MG/ML
1 INJECTION, SOLUTION SUBCUTANEOUS WEEKLY
Qty: 9 ML | Refills: 1 | Status: SHIPPED | OUTPATIENT
Start: 2024-05-15

## 2024-05-15 RX ORDER — LISINOPRIL 5 MG/1
5 TABLET ORAL DAILY
Qty: 90 TABLET | Refills: 1 | Status: SHIPPED | OUTPATIENT
Start: 2024-05-15

## 2024-05-15 RX ORDER — FAMOTIDINE 20 MG/1
20 TABLET, FILM COATED ORAL DAILY PRN
Qty: 90 TABLET | Refills: 1 | Status: SHIPPED | OUTPATIENT
Start: 2024-05-15

## 2024-05-15 RX ORDER — ROSUVASTATIN CALCIUM 5 MG/1
5 TABLET, COATED ORAL DAILY
Qty: 90 TABLET | Refills: 1 | Status: SHIPPED | OUTPATIENT
Start: 2024-05-15

## 2024-05-16 ENCOUNTER — TELEPHONE (OUTPATIENT)
Dept: SLEEP MEDICINE | Age: 59
End: 2024-05-16

## 2024-05-16 DIAGNOSIS — R26.89 STUMBLING GAIT: ICD-10-CM

## 2024-05-16 DIAGNOSIS — R47.89 OTHER SPEECH DISTURBANCE: Primary | ICD-10-CM

## 2024-05-16 NOTE — TELEPHONE ENCOUNTER
Written script for   zaleplon (SONATA) 5 MG capsule   States she is still waking up through out night. Around 3 am  Asking if dose can be increased.   Please follow up

## 2024-05-22 ENCOUNTER — FOLLOWUP TELEPHONE ENCOUNTER (OUTPATIENT)
Dept: DIABETES SERVICES | Age: 59
End: 2024-05-22

## 2024-05-22 NOTE — TELEPHONE ENCOUNTER
Pt left message that she would not be able to make her nutrition 2 class on 5/31/24 and wanted to speak with me. Called and left message.

## 2024-05-23 NOTE — TELEPHONE ENCOUNTER
LOV 3/11/24- Boota    Written script for   zaleplon (SONATA) 5 MG capsule   States she is still waking up through out night. Around 3 am  Asking if dose can be increased.   Please follow up

## 2024-05-28 DIAGNOSIS — G47.00 PERSISTENT DISORDER OF INITIATING OR MAINTAINING SLEEP: ICD-10-CM

## 2024-05-28 RX ORDER — ZALEPLON 10 MG/1
10 CAPSULE ORAL NIGHTLY
Qty: 30 CAPSULE | Refills: 3 | Status: SHIPPED | OUTPATIENT
Start: 2024-05-28 | End: 2024-09-25

## 2024-05-28 NOTE — PROGRESS NOTES
I adjusted this patient prescription for Sonata from 5 mg to 10 mg nightly and sent a new prescription to her pharmacy at [facility] on [address]

## 2024-05-29 ENCOUNTER — TELEPHONE (OUTPATIENT)
Dept: SLEEP MEDICINE | Age: 59
End: 2024-05-29

## 2024-05-29 NOTE — TELEPHONE ENCOUNTER
Spoke with pt, she states she went to the pharmacy and had her prescription filled for the 10mg Sonata. She will let us know how she is doing with that dosage

## 2024-06-05 DIAGNOSIS — I67.81 LEUKOARAIOSIS: ICD-10-CM

## 2024-06-05 DIAGNOSIS — R26.89 STUMBLING GAIT: Primary | ICD-10-CM

## 2024-06-13 NOTE — THERAPY EVALUATION
Glenda Ann Sheba  : 1965  Primary: [facility]  Secondary:  [facility] [place] [address] [place] [address] [place] [address]  Phone: [phone]  Fax: [phone] Plan Frequency: 1x (eval)    Plan of Care/Certification Expiration Date: 24        Plan of Care/Certification Expiration Date:  Plan of Care/Certification Expiration Date: 24    Frequency/Duration: Plan Frequency: 1x (eval)      Time In/Out:   Time In: 846  Time Out: 927      PT Visit Info:    Plan Frequency: 1x (eval)  Progress Note Counter: 1      Visit Count:  1                OUTPATIENT PHYSICAL THERAPY:             Initial Assessment and Discharge Summary 2024               Episode (Balance)         Treatment Diagnosis:     Difficulty walking  Medical/Referring Diagnosis:    Stumbling gait [R26.89]  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 Pt has a diagnosis of Leukoaraiosis following a brain MRI. She says her  started noticing her \"stumbling\" once a day for about a month. She says she means she gets slightly off balance when she says stumbling. She is not sure if it has been happening longer. She denies any dizziness, or numbness in either LE. She says she does not notice it at all when it happens.     Pain/Symptom Location: none          Aggravating Factors/ Functional limitations: walking when her  notices her balance being \"stumbling\".    Falls: none    Diagnostic Imaging: see brain MRI    Patient Stated Goal(s):  Pt is hoping to determine the cause of her occasional balance problems.  Initial Pain

## 2024-06-14 ENCOUNTER — HOSPITAL ENCOUNTER (OUTPATIENT)
Dept: PHYSICAL THERAPY | Age: 59
Setting detail: RECURRING SERIES
Discharge: HOME | End: 2024-06-17
Payer: COMMERCIAL

## 2024-06-14 DIAGNOSIS — R26.2 DIFFICULTY WALKING: Primary | ICD-10-CM

## 2024-06-14 PROCEDURE — 97162 PT EVAL MOD COMPLEX 30 MIN: CPT

## 2024-06-14 ASSESSMENT — PAIN SCALES - GENERAL: PAINLEVEL_OUTOF10: 0

## 2024-06-17 ENCOUNTER — PATIENT MESSAGE (OUTPATIENT)
Dept: SLEEP MEDICINE | Age: 59
End: 2024-06-17

## 2024-06-18 ENCOUNTER — TELEPHONE (OUTPATIENT)
Dept: SLEEP MEDICINE | Age: 59
End: 2024-06-18

## 2024-06-18 NOTE — TELEPHONE ENCOUNTER
From patient: Hello. I have had my cpap for 2 weeks. And I have a couple of questions about some numbers I’m seeing. In my sleep study I was having 17 events per hour. Now with my cpap I am having between 7-13 events per hour. I would expect that number to be lower. I checked with my [employer]   and she suggested I ask you about that. I have a couple of other questions too. I wear an Apple Watch that tracks my blood ox and breaths per minute. For the past 2 weeks by breaths per minutes have ranged between 7-17 while sleeping. My blood ox is in the 90’s but dipped below 90, 5 times in those same 2 weeks while sleeping.    Is this all related to my sleep apnea or is it something more?  Thank you for reviewing. Please call if there is something I need to do.       Patient was setup on 06/03/2024.  Please advise.     CPAP download:

## 2024-06-18 NOTE — TELEPHONE ENCOUNTER
From: Glenda Scruggs  To: Dr. Sarah Briones  Sent: 6/17/2024 4:13 PM EDT  Subject: Questions about my Cpap    Hello. I have had my cpap for 2 weeks. And I have a couple of questions about some numbers I’m seeing. In my sleep study I was having 17 events per hour. Now with my cpap I am having between 7-13 events per hour. I would expect that number to be lower. I checked with my [employer]  and she suggested I ask you about that. I have a couple of other questions too. I wear an Apple Watch that tracks my blood ox and breaths per minute. For the past 2 weeks by breaths per minutes have ranged between 7-17 while sleeping. My blood ox is in the 90’s but dipped below 90, 5 times in those same 2 weeks while sleeping.   Is this all related to my sleep apnea or is it something more?  Thank you for reviewing. Please call if there is something I need to do.   
See other encounter.   
no

## 2024-06-20 DIAGNOSIS — E11.9 CONTROLLED TYPE 2 DIABETES MELLITUS WITHOUT COMPLICATION, WITHOUT LONG-TERM CURRENT USE OF INSULIN (HCC): Primary | ICD-10-CM

## 2024-06-20 DIAGNOSIS — E03.9 HYPOTHYROIDISM, ADULT: ICD-10-CM

## 2024-06-20 DIAGNOSIS — E78.5 DYSLIPIDEMIA: ICD-10-CM

## 2024-07-11 NOTE — PROGRESS NOTES
bleeding.   :   Denies history of dysuria, hematuria, polyuria, or decreased urine output.   MS:   No history of myalgias, arthralgias, bone pain, or muscle cramps.   SKIN:   No history of rashes, jaundice, cyanosis, nodules, or ulcers.   ENDO:   Negative for heat or cold intolerance.  No history of DM.   PSYCH:   Negative for anxiety, depression, insomnia, hallucinations.   NEURO:   There is no history of AMS, persistent headache, decreased level of consciousness, seizures, or motor or sensory deficits.      PHYSICAL EXAM:    Vitals:    07/12/24 0941   BP: 118/80   Pulse: 81   Resp: 14   SpO2: 98%          GENERAL APPEARANCE:   The patient is normal weight and in no respiratory distress.   HEENT:   PERRL.  Conjunctivae unremarkable.   Nasal mucosa is without epistaxis, exudate, or polyps.  Gums and dentition are unremarkable.  There is severe oropharyngeal narrowing.  She is Mallampati 3   NECK/LYMPHATIC:   Symmetrical with no elevation of jugular venous pulsation.  Trachea midline. No thyroid enlargement.  No cervical adenopathy.   LUNGS:   Normal respiratory effort with symmetrical lung expansion.   Breath sounds are clear bilaterally.   HEART:   There is a regular rate and rhythm.  No murmur, rub, or gallop.  There is no edema in the lower extremities.   ABDOMEN:   Soft and non-tender.  No hepatosplenomegaly.  Bowel sounds are normal.     SKIN:   There are no rashes, cyanosis, jaundice, or ecchymosis present.   EXTREMITIES:   The extremities are unremarkable without clubbing, cyanosis, joint inflammation, degenerative, or ischemic change.   MUSCULOSKELETAL:   There is no abnormal tone, muscle atrophy, or abnormal movement present.   NEURO:   The patient is alert and oriented to person, place, and time.  Memory appears intact and mood is normal.  No gross sensorimotor deficits are present.      DIAGNOSTIC TESTS:  HST  3/27/24               ASSESSMENT:  (Medical Decision Making)         ICD-10-CM    1.

## 2024-07-12 ENCOUNTER — OFFICE VISIT (OUTPATIENT)
Dept: SLEEP MEDICINE | Age: 59
End: 2024-07-12

## 2024-07-12 VITALS
HEIGHT: 62 IN | BODY MASS INDEX: 36.99 KG/M2 | DIASTOLIC BLOOD PRESSURE: 80 MMHG | HEART RATE: 81 BPM | RESPIRATION RATE: 14 BRPM | SYSTOLIC BLOOD PRESSURE: 118 MMHG | WEIGHT: 201 LBS | OXYGEN SATURATION: 98 %

## 2024-07-12 DIAGNOSIS — G47.33 OSA (OBSTRUCTIVE SLEEP APNEA): Primary | ICD-10-CM

## 2024-07-12 DIAGNOSIS — G47.00 PERSISTENT DISORDER OF INITIATING OR MAINTAINING SLEEP: ICD-10-CM

## 2024-07-12 DIAGNOSIS — G47.33 OSA ON CPAP: ICD-10-CM

## 2024-07-12 RX ORDER — MELOXICAM 15 MG/1
TABLET ORAL
COMMUNITY
Start: 2024-07-11

## 2024-07-12 RX ORDER — CYCLOBENZAPRINE HCL 5 MG
TABLET ORAL
COMMUNITY
Start: 2024-07-11

## 2024-07-12 ASSESSMENT — SLEEP AND FATIGUE QUESTIONNAIRES
HOW LIKELY ARE YOU TO NOD OFF OR FALL ASLEEP WHILE SITTING QUIETLY AFTER LUNCH WITHOUT ALCOHOL: WOULD NEVER DOZE
HOW LIKELY ARE YOU TO NOD OFF OR FALL ASLEEP WHILE SITTING AND READING: WOULD NEVER DOZE
HOW LIKELY ARE YOU TO NOD OFF OR FALL ASLEEP WHILE SITTING AND TALKING TO SOMEONE: WOULD NEVER DOZE
HOW LIKELY ARE YOU TO NOD OFF OR FALL ASLEEP WHILE WATCHING TV: WOULD NEVER DOZE
ESS TOTAL SCORE: 0
HOW LIKELY ARE YOU TO NOD OFF OR FALL ASLEEP IN A CAR, WHILE STOPPED FOR A FEW MINUTES IN TRAFFIC: WOULD NEVER DOZE
HOW LIKELY ARE YOU TO NOD OFF OR FALL ASLEEP WHILE LYING DOWN TO REST IN THE AFTERNOON WHEN CIRCUMSTANCES PERMIT: WOULD NEVER DOZE
HOW LIKELY ARE YOU TO NOD OFF OR FALL ASLEEP WHILE SITTING INACTIVE IN A PUBLIC PLACE: WOULD NEVER DOZE
HOW LIKELY ARE YOU TO NOD OFF OR FALL ASLEEP WHEN YOU ARE A PASSENGER IN A CAR FOR AN HOUR WITHOUT A BREAK: WOULD NEVER DOZE

## 2024-07-31 ENCOUNTER — LAB (OUTPATIENT)
Dept: FAMILY MEDICINE CLINIC | Facility: CLINIC | Age: 59
End: 2024-07-31

## 2024-07-31 DIAGNOSIS — E11.9 CONTROLLED TYPE 2 DIABETES MELLITUS WITHOUT COMPLICATION, WITHOUT LONG-TERM CURRENT USE OF INSULIN (HCC): ICD-10-CM

## 2024-07-31 LAB
ANION GAP SERPL CALC-SCNC: 12 MMOL/L (ref 9–18)
BUN SERPL-MCNC: 18 MG/DL (ref 6–23)
CALCIUM SERPL-MCNC: 9.5 MG/DL (ref 8.8–10.2)
CHLORIDE SERPL-SCNC: 103 MMOL/L (ref 98–107)
CO2 SERPL-SCNC: 24 MMOL/L (ref 20–28)
CREAT SERPL-MCNC: 0.96 MG/DL (ref 0.6–1.1)
EST. AVERAGE GLUCOSE BLD GHB EST-MCNC: 154 MG/DL
GLUCOSE SERPL-MCNC: 130 MG/DL (ref 70–99)
HBA1C MFR BLD: 7 % (ref 0–5.6)
POTASSIUM SERPL-SCNC: 4.4 MMOL/L (ref 3.5–5.1)
SODIUM SERPL-SCNC: 138 MMOL/L (ref 136–145)

## 2024-08-06 PROBLEM — F33.41 MDD (MAJOR DEPRESSIVE DISORDER), RECURRENT, IN PARTIAL REMISSION (HCC): Status: RESOLVED | Noted: 2023-07-26 | Resolved: 2024-08-06

## 2024-08-06 PROBLEM — G47.33 OSA (OBSTRUCTIVE SLEEP APNEA): Status: RESOLVED | Noted: 2024-05-13 | Resolved: 2024-08-06

## 2024-08-06 NOTE — PROGRESS NOTES
following was discussed:     Diabetes is well controlled   --Agreed to add Jardiance 10 mg daily and will re-eval in 3 mos. Also placed a referral for her to see a diabetes nutritionist.     Will send to lab today to evaluate for possible causes of c/o brain fog, word-finding difficulties, stumbling.  (No stumbling noted on exam today).  Will need MRI of brain if labs come back normal.  She reports that she is not claustrophobic and would not need medication to sedate her for an MRI.   --6/5/24:  Rev'd MRI--she has leukoaraiosis of the brain, likely contributing to stumbling gate, speech issues.  Mssg sent to pt and referred to PT.    At today's visit:     Pain in right leg.  She has tenderness on her bilat knee joint and pain radiates down the lateral side of her right leg.  Symptoms have been present for the past 4-5 days.        Vitals:    08/07/24 0853   BP: 114/70   Pulse: 88   Resp: 14   Temp: 98.7 °F (37.1 °C)   TempSrc: Oral   SpO2: 97%   Weight: 90.7 kg (200 lb)   Height: 1.575 m (5' 2\")        Orders Placed This Encounter    meloxicam (MOBIC) 15 MG tablet     Sig: Take w/ food     Dispense:  30 tablet     Refill:  0    Semaglutide,0.25 or 0.5MG/DOS, (OZEMPIC, 0.25 OR 0.5 MG/DOSE,) 2 MG/3ML SOPN     Sig: Inject 2 mg into the skin once a week     Dispense:  9 mL     Refill:  0     Please d/c any orders on file for Ozempic 1mg weekly & also d/c orders on file for Jardiance    famotidine (PEPCID) 20 MG tablet     Sig: Take 1 tablet by mouth daily as needed (acid reflux)     Dispense:  90 tablet     Refill:  1     Please place rx on file until pt. Requests refill.    levothyroxine (SYNTHROID) 50 MCG tablet     Sig: Take 1 tablet by mouth every morning (before breakfast)     Dispense:  90 tablet     Refill:  2    lisinopril (PRINIVIL;ZESTRIL) 5 MG tablet     Sig: Take 1 tablet by mouth daily TAKE ONE TABLET BY MOUTH ONE TIME DAILY AT NIGHT     Dispense:  90 tablet     Refill:  0    metFORMIN (GLUCOPHAGE) 500 MG

## 2024-08-07 ENCOUNTER — TELEPHONE (OUTPATIENT)
Dept: FAMILY MEDICINE CLINIC | Facility: CLINIC | Age: 59
End: 2024-08-07

## 2024-08-07 ENCOUNTER — OFFICE VISIT (OUTPATIENT)
Dept: FAMILY MEDICINE CLINIC | Facility: CLINIC | Age: 59
End: 2024-08-07

## 2024-08-07 VITALS
HEART RATE: 88 BPM | TEMPERATURE: 98.7 F | OXYGEN SATURATION: 97 % | WEIGHT: 200 LBS | DIASTOLIC BLOOD PRESSURE: 70 MMHG | SYSTOLIC BLOOD PRESSURE: 114 MMHG | HEIGHT: 62 IN | RESPIRATION RATE: 14 BRPM | BODY MASS INDEX: 36.8 KG/M2

## 2024-08-07 DIAGNOSIS — I67.81 LEUKOARAIOSIS: ICD-10-CM

## 2024-08-07 DIAGNOSIS — E03.9 HYPOTHYROIDISM, ADULT: ICD-10-CM

## 2024-08-07 DIAGNOSIS — M25.561 ACUTE PAIN OF RIGHT KNEE: ICD-10-CM

## 2024-08-07 DIAGNOSIS — K21.9 GASTROESOPHAGEAL REFLUX DISEASE, UNSPECIFIED WHETHER ESOPHAGITIS PRESENT: ICD-10-CM

## 2024-08-07 DIAGNOSIS — R23.2 HOT FLASHES: ICD-10-CM

## 2024-08-07 DIAGNOSIS — E11.9 CONTROLLED TYPE 2 DIABETES MELLITUS WITHOUT COMPLICATION, WITHOUT LONG-TERM CURRENT USE OF INSULIN (HCC): Primary | ICD-10-CM

## 2024-08-07 DIAGNOSIS — E78.5 DYSLIPIDEMIA: ICD-10-CM

## 2024-08-07 RX ORDER — FAMOTIDINE 20 MG/1
20 TABLET, FILM COATED ORAL DAILY PRN
Qty: 90 TABLET | Refills: 1 | Status: SHIPPED | OUTPATIENT
Start: 2024-08-07

## 2024-08-07 RX ORDER — LISINOPRIL 5 MG/1
5 TABLET ORAL DAILY
Qty: 90 TABLET | Refills: 0 | Status: SHIPPED | OUTPATIENT
Start: 2024-08-07

## 2024-08-07 RX ORDER — MELOXICAM 15 MG/1
TABLET ORAL
Qty: 30 TABLET | Refills: 0 | Status: SHIPPED | OUTPATIENT
Start: 2024-08-07 | End: 2024-08-07 | Stop reason: SDUPTHER

## 2024-08-07 RX ORDER — GABAPENTIN 100 MG/1
100 CAPSULE ORAL NIGHTLY
Qty: 90 CAPSULE | Refills: 0 | Status: SHIPPED | OUTPATIENT
Start: 2024-08-07 | End: 2025-02-03

## 2024-08-07 RX ORDER — ROSUVASTATIN CALCIUM 5 MG/1
5 TABLET, COATED ORAL DAILY
Qty: 90 TABLET | Refills: 0 | Status: SHIPPED | OUTPATIENT
Start: 2024-08-07

## 2024-08-07 RX ORDER — MELOXICAM 15 MG/1
15 TABLET ORAL DAILY PRN
Qty: 30 TABLET | Refills: 0 | Status: SHIPPED | OUTPATIENT
Start: 2024-08-07

## 2024-08-07 RX ORDER — SEMAGLUTIDE 0.68 MG/ML
2 INJECTION, SOLUTION SUBCUTANEOUS WEEKLY
Qty: 9 ML | Refills: 0 | Status: SHIPPED | OUTPATIENT
Start: 2024-08-07 | End: 2024-08-08 | Stop reason: DRUGHIGH

## 2024-08-07 RX ORDER — LEVOTHYROXINE SODIUM 0.05 MG/1
50 TABLET ORAL
Qty: 90 TABLET | Refills: 2 | Status: SHIPPED | OUTPATIENT
Start: 2024-08-07

## 2024-08-07 SDOH — ECONOMIC STABILITY: FOOD INSECURITY: WITHIN THE PAST 12 MONTHS, YOU WORRIED THAT YOUR FOOD WOULD RUN OUT BEFORE YOU GOT MONEY TO BUY MORE.: NEVER TRUE

## 2024-08-07 SDOH — ECONOMIC STABILITY: FOOD INSECURITY: WITHIN THE PAST 12 MONTHS, THE FOOD YOU BOUGHT JUST DIDN'T LAST AND YOU DIDN'T HAVE MONEY TO GET MORE.: NEVER TRUE

## 2024-08-07 SDOH — ECONOMIC STABILITY: INCOME INSECURITY: HOW HARD IS IT FOR YOU TO PAY FOR THE VERY BASICS LIKE FOOD, HOUSING, MEDICAL CARE, AND HEATING?: NOT HARD AT ALL

## 2024-08-07 NOTE — TELEPHONE ENCOUNTER
Pharmacy is calling to get clear directions that were recently written for this patient.  Prescription does not state how often she needs to take the medication.    Meloxicam (Mobic) 15 mg tablet    Please call and advise.    Thank you

## 2024-08-08 ENCOUNTER — TELEPHONE (OUTPATIENT)
Dept: FAMILY MEDICINE CLINIC | Facility: CLINIC | Age: 59
End: 2024-08-08

## 2024-08-08 DIAGNOSIS — E11.9 CONTROLLED TYPE 2 DIABETES MELLITUS WITHOUT COMPLICATION, WITHOUT LONG-TERM CURRENT USE OF INSULIN (HCC): ICD-10-CM

## 2024-08-08 NOTE — TELEPHONE ENCOUNTER
Minh from [facility] has called on behalf of the patient, needing clarification on the prescription Semaglutide, pertaining to the dose and the frequency.     [facility] [place], [place] - [address] [place] [address] AVE, [place] [address] - P [phone] - F [phone]  [address] [facility] [address] [address]  Phone: [phone]  Fax: [phone]

## 2024-08-15 ENCOUNTER — OFFICE VISIT (OUTPATIENT)
Dept: ORTHOPEDIC SURGERY | Age: 59
End: 2024-08-15

## 2024-08-15 VITALS — BODY MASS INDEX: 37.13 KG/M2 | WEIGHT: 201.8 LBS | HEIGHT: 62 IN

## 2024-08-15 DIAGNOSIS — M25.561 RIGHT KNEE PAIN, UNSPECIFIED CHRONICITY: ICD-10-CM

## 2024-08-15 DIAGNOSIS — M17.11 ARTHRITIS OF RIGHT KNEE: Primary | ICD-10-CM

## 2024-08-15 RX ORDER — METHYLPREDNISOLONE ACETATE 40 MG/ML
40 INJECTION, SUSPENSION INTRA-ARTICULAR; INTRALESIONAL; INTRAMUSCULAR; SOFT TISSUE ONCE
Status: COMPLETED | OUTPATIENT
Start: 2024-08-15 | End: 2024-08-15

## 2024-08-15 RX ADMIN — METHYLPREDNISOLONE ACETATE 80 MG: 40 INJECTION, SUSPENSION INTRA-ARTICULAR; INTRALESIONAL; INTRAMUSCULAR; SOFT TISSUE at 11:45

## 2024-08-15 NOTE — PROGRESS NOTES
Patient ID:  Glenda Scruggs  [id number]  59 y.o.  1965    Today: August 15, 2024          Chief Complaint:  Right Knee pain    HPI:       Glenda Scruggs is a 59 y.o. female seen for evaluation and treatment of right knee pain.  She notes pain for roughly 3 weeks.  This was made worse slightly when she did overflowed sitting in the river however the water was cold and that may have helped her initial healing.  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  She difficulty ambulating.  She went to the urgent care where they diagnosed with an MCL sprain.  Patient presented to her PCP who gave her meloxicam.  She has persistent weightbearing pain about the medial aspect of her knee.  She is no hip pain low back pain radicular symptoms.  She is a diabetic with a hemoglobin A1c roughly 7 on metformin and Ozempic    Past Medical History:  Past Medical History:   Diagnosis Date    Adverse effect of anesthesia     delayed awakening; pt reports also teary post-op    Anxiety     mananged with meds    Depression     Diabetes (HCC)     Type 2, oral meds, ozempic, last A1C 6.5 in 12/2023, denies hypo    Headache     History of kidney stones     Hypertension     managed with meds    Thyroid disease        Past Surgical History:  Past Surgical History:   Procedure Laterality Date    BREAST REDUCTION SURGERY  1999    COLONOSCOPY  2017    DILATION AND CURETTAGE OF UTERUS  10/2017    endometrial ablation    FRAGMENT KIDNEY STONE/ ESWL      HEENT      GUM SURGURY    HEENT  2018    sinus    HERNIA REPAIR Bilateral 1/22/2024    HERNIA INGUINAL REPAIR LAPAROSCOPIC ROBOTIC performed by Cherelle Kennedy MD at [facility] [place] [facility] OR    INGUINAL HERNIA REPAIR Bilateral 01/22/2024    SHOULDER ARTHROSCOPY Left     Slap tear repair    TRABECULECTOMY  2011    Dr. Anguiano        Medications:     Prior to Admission medications    Medication Sig Start

## 2024-08-16 ENCOUNTER — OFFICE VISIT (OUTPATIENT)
Dept: BEHAVIORAL/MENTAL HEALTH CLINIC | Age: 59
End: 2024-08-16
Payer: COMMERCIAL

## 2024-08-16 VITALS — HEART RATE: 70 BPM | OXYGEN SATURATION: 99 % | DIASTOLIC BLOOD PRESSURE: 74 MMHG | SYSTOLIC BLOOD PRESSURE: 112 MMHG

## 2024-08-16 DIAGNOSIS — F33.42 MDD (MAJOR DEPRESSIVE DISORDER), RECURRENT, IN FULL REMISSION (HCC): Primary | ICD-10-CM

## 2024-08-16 PROCEDURE — G8417 CALC BMI ABV UP PARAM F/U: HCPCS | Performed by: STUDENT IN AN ORGANIZED HEALTH CARE EDUCATION/TRAINING PROGRAM

## 2024-08-16 PROCEDURE — 99213 OFFICE O/P EST LOW 20 MIN: CPT | Performed by: STUDENT IN AN ORGANIZED HEALTH CARE EDUCATION/TRAINING PROGRAM

## 2024-08-16 PROCEDURE — G8427 DOCREV CUR MEDS BY ELIG CLIN: HCPCS | Performed by: STUDENT IN AN ORGANIZED HEALTH CARE EDUCATION/TRAINING PROGRAM

## 2024-08-16 PROCEDURE — 3017F COLORECTAL CA SCREEN DOC REV: CPT | Performed by: STUDENT IN AN ORGANIZED HEALTH CARE EDUCATION/TRAINING PROGRAM

## 2024-08-16 PROCEDURE — 1036F TOBACCO NON-USER: CPT | Performed by: STUDENT IN AN ORGANIZED HEALTH CARE EDUCATION/TRAINING PROGRAM

## 2024-08-16 RX ORDER — VILAZODONE HYDROCHLORIDE 40 MG/1
40 TABLET ORAL DAILY
Qty: 90 TABLET | Refills: 1 | Status: SHIPPED | OUTPATIENT
Start: 2024-08-16 | End: 2025-02-12

## 2024-08-16 NOTE — PROGRESS NOTES
AM    HGB 14.2 05/15/2024 09:39 AM    HCT 44.6 05/15/2024 09:39 AM    MCV 82.9 05/15/2024 09:39 AM    MCH 26.4 05/15/2024 09:39 AM    MCHC 31.8 05/15/2024 09:39 AM    RDW 13.9 05/15/2024 09:39 AM    MPV 9.7 05/15/2024 09:39 AM    MONOPCT 8 05/15/2024 09:39 AM    EOSPCT 2 05/15/2024 09:39 AM    BASOPCT 0 05/15/2024 09:39 AM    DIFFTYPE AUTOMATED 05/15/2024 09:39 AM         Lab Results   Component Value Date    TRIG 117 05/08/2024    HDL 40 05/08/2024    LDL 40 05/08/2024    CHOL 104 05/08/2024    GLUCOSE 130 (H) 07/31/2024        All pertinent/available labs reviewed.         Mental Status Examination    General/Appearance: Appears stated age, Well-kept, and Appropriately attired    Behavior: cooperative, engaged    Eye Contact: good    Psychomotor: Within normal limits    Musculoskeletal: gait wnl    Speech/Language: Within normal limits    Mood: \"good\"    Affect: Appropriate, Congruent, and Full-range    Thought process: linear, goal directed, and coherent    Thought content: denies SI/HI, A/VH, paranoia or delusions      Orientation: oriented to person, place, and time    Memory: Intact long-term and Intact short-term    Attention/Concentration: Sustained    Fund of knowledge: fair    Judgement: good    Insight: fair         Questionnaire Findings:    PHQ2: 0 (8/15/24)    GAD7: 5 (6/22/23)        Assessment/Summary of Findings:    Glenda Scruggs is a 59 y.o. female with reported prior psychiatric history significant for recurrent depression who presents for medication management. They are currently prescribed: Viibryd 40 mg daily.     Pt reports that she has continued to do well, but has experienced some recurrence of grief with upcoming anniversary of mother's death. Recently diagnosed with CRICKET and started on CPAP, in addition to Sonata 10 mg QHS. Reports compliance and denies significant SE. Denies SI/HI, A/VH, paranoia or delusions. Discussed maintaining regimen and will f/u in 6 mo.      Diagnosis:

## 2024-08-22 ENCOUNTER — TELEMEDICINE (OUTPATIENT)
Dept: FAMILY MEDICINE CLINIC | Facility: CLINIC | Age: 59
End: 2024-08-22
Payer: COMMERCIAL

## 2024-08-22 DIAGNOSIS — U07.1 COVID: Primary | ICD-10-CM

## 2024-08-22 DIAGNOSIS — R11.2 NAUSEA AND VOMITING, UNSPECIFIED VOMITING TYPE: ICD-10-CM

## 2024-08-22 PROCEDURE — G2211 COMPLEX E/M VISIT ADD ON: HCPCS | Performed by: PHYSICIAN ASSISTANT

## 2024-08-22 PROCEDURE — 99214 OFFICE O/P EST MOD 30 MIN: CPT | Performed by: PHYSICIAN ASSISTANT

## 2024-08-22 PROCEDURE — G8428 CUR MEDS NOT DOCUMENT: HCPCS | Performed by: PHYSICIAN ASSISTANT

## 2024-08-22 PROCEDURE — 1036F TOBACCO NON-USER: CPT | Performed by: PHYSICIAN ASSISTANT

## 2024-08-22 PROCEDURE — 3017F COLORECTAL CA SCREEN DOC REV: CPT | Performed by: PHYSICIAN ASSISTANT

## 2024-08-22 PROCEDURE — G8417 CALC BMI ABV UP PARAM F/U: HCPCS | Performed by: PHYSICIAN ASSISTANT

## 2024-08-22 RX ORDER — ONDANSETRON 4 MG/1
4 TABLET, ORALLY DISINTEGRATING ORAL 3 TIMES DAILY PRN
Qty: 21 TABLET | Refills: 0 | Status: SHIPPED | OUTPATIENT
Start: 2024-08-22

## 2024-08-22 NOTE — PROGRESS NOTES
Glenda Scruggs  (1965) is an established patient, here for evaluation of the following:    Assessment & Plan   Below is the assessment and plan developed based on review of pertinent history, physical exam, labs, studies, and medications.    Glenda was seen today for positive covid home test.    Diagnoses and all orders for this visit:    COVID  -     nirmatrelvir/ritonavir 300/100 (PAXLOVID, 300/100,) 20 x 150 MG & 10 x 100MG TBPK; Take 3 tablets (two 150 mg nirmatrelvir and one 100 mg ritonavir tablets) by mouth every 12 hours for 5 days.    Nausea and vomiting, unspecified vomiting type  -     ondansetron (ZOFRAN-ODT) 4 MG disintegrating tablet; Take 1 tablet by mouth 3 times daily as needed for Nausea or Vomiting        Pt has covid.  Symptom onset was last night.    she  is 59 y.o.  years old and at an increased risk of having an adverse outcome due to Asthma, type 2 diabetes, and leukoaraiosis of the brain.  Current renal function is WNL.   she does not have severe hepatic impairment.  she  is currently on rosuvastatin which willr need to be held during tx w/ Paxlovid. her initial dose of paxlovid should be postponed until >12 hrs following her last dose of rosuvastatin.      It is recommended to take TUMS or Maalox for acid reflux that typically occurs while taking this medication.      Quarantine recommendations review with patient.         Follow Up    Return for f/u as scheduled.          Subjective     HPI    Patient tested positive for covid this morning.  Her spouse tested positive yesterday.    She vomitted 3x this morning and is experiencing sinus congestion.        Patient-Reported Vitals      8/22/2024    11:28 AM   Patient-Reported Vitals   Patient-Reported Weight 200   Patient-Reported Height 5’2”   Patient-Reported Systolic 141 mmHg   Patient-Reported Diastolic 81 mmHg   Patient-Reported Pulse 108   Patient-Reported Temperature 98.1           Orders Placed This Encounter

## 2024-10-16 DIAGNOSIS — E78.5 DYSLIPIDEMIA: ICD-10-CM

## 2024-10-16 DIAGNOSIS — E11.9 CONTROLLED TYPE 2 DIABETES MELLITUS WITHOUT COMPLICATION, WITHOUT LONG-TERM CURRENT USE OF INSULIN (HCC): ICD-10-CM

## 2024-10-16 DIAGNOSIS — E03.9 HYPOTHYROIDISM, ADULT: Primary | ICD-10-CM

## 2024-10-17 ENCOUNTER — TELEPHONE (OUTPATIENT)
Dept: FAMILY MEDICINE CLINIC | Facility: CLINIC | Age: 59
End: 2024-10-17

## 2024-10-17 ENCOUNTER — TELEPHONE (OUTPATIENT)
Dept: ORTHOPEDIC SURGERY | Age: 59
End: 2024-10-17

## 2024-10-17 NOTE — TELEPHONE ENCOUNTER
Patient saw Dr Collado 8/15/24 RT knee pain, not any better, is going to [place] first of Nov. And needs advice on what to do

## 2024-10-17 NOTE — TELEPHONE ENCOUNTER
Spoke with patient who states that she was injured in August and is still having pain the knee. She states that she is doing PT 2x a week and the therapist states that she is stronger and has ROM but the patient isn't having arthritis pain, but pain from the injury. She would like to know if a Medrol dose pack would be helpful since she is not able to get another steroid injection.  Patient is going to [place] in November and would like to have some relief before ahead of time.  Patient aware that I will pass this message to Dr. Collado and will call her back with Dr. Collado's recommendations. Patient verbalized understanding and voiced no other concerns at this time.

## 2024-10-18 DIAGNOSIS — M17.11 ARTHRITIS OF RIGHT KNEE: Primary | ICD-10-CM

## 2024-10-18 RX ORDER — METHYLPREDNISOLONE 4 MG
TABLET, DOSE PACK ORAL
Qty: 1 KIT | Refills: 0 | Status: SHIPPED | OUTPATIENT
Start: 2024-10-18 | End: 2024-10-24

## 2024-10-18 NOTE — TELEPHONE ENCOUNTER
Spoke with patient and informed her that Dr. Collado sent is a medrol dose pack. Patient verbalized understanding and voiced no other concerns at this time.

## 2024-10-31 ENCOUNTER — LAB (OUTPATIENT)
Dept: FAMILY MEDICINE CLINIC | Facility: CLINIC | Age: 59
End: 2024-10-31

## 2024-10-31 DIAGNOSIS — E78.5 DYSLIPIDEMIA: ICD-10-CM

## 2024-10-31 DIAGNOSIS — E11.9 CONTROLLED TYPE 2 DIABETES MELLITUS WITHOUT COMPLICATION, WITHOUT LONG-TERM CURRENT USE OF INSULIN (HCC): ICD-10-CM

## 2024-10-31 LAB
ALBUMIN SERPL-MCNC: 3.7 G/DL (ref 3.5–5)
ALBUMIN/GLOB SERPL: 1.1 (ref 1–1.9)
ALP SERPL-CCNC: 88 U/L (ref 35–104)
ALT SERPL-CCNC: 13 U/L (ref 8–45)
ANION GAP SERPL CALC-SCNC: 9 MMOL/L (ref 7–16)
AST SERPL-CCNC: 21 U/L (ref 15–37)
BILIRUB SERPL-MCNC: 0.3 MG/DL (ref 0–1.2)
BUN SERPL-MCNC: 18 MG/DL (ref 6–23)
CALCIUM SERPL-MCNC: 9.2 MG/DL (ref 8.8–10.2)
CHLORIDE SERPL-SCNC: 104 MMOL/L (ref 98–107)
CHOLEST SERPL-MCNC: 121 MG/DL (ref 0–200)
CO2 SERPL-SCNC: 26 MMOL/L (ref 20–29)
CREAT SERPL-MCNC: 0.85 MG/DL (ref 0.6–1.1)
CREAT UR-MCNC: 160 MG/DL (ref 28–217)
EST. AVERAGE GLUCOSE BLD GHB EST-MCNC: 157 MG/DL
GLOBULIN SER CALC-MCNC: 3.4 G/DL (ref 2.3–3.5)
GLUCOSE SERPL-MCNC: 146 MG/DL (ref 70–99)
HBA1C MFR BLD: 7.1 % (ref 0–5.6)
HDLC SERPL-MCNC: 38 MG/DL (ref 40–60)
HDLC SERPL: 3.2 (ref 0–5)
LDLC SERPL CALC-MCNC: 59 MG/DL (ref 0–100)
MICROALBUMIN UR-MCNC: <1.2 MG/DL (ref 0–20)
MICROALBUMIN/CREAT UR-RTO: NORMAL MG/G (ref 0–30)
POTASSIUM SERPL-SCNC: 4.6 MMOL/L (ref 3.5–5.1)
PROT SERPL-MCNC: 7.1 G/DL (ref 6.3–8.2)
SODIUM SERPL-SCNC: 139 MMOL/L (ref 136–145)
TRIGL SERPL-MCNC: 119 MG/DL (ref 0–150)
VLDLC SERPL CALC-MCNC: 24 MG/DL (ref 6–23)

## 2024-11-14 PROBLEM — M17.11 ARTHROPATHY OF RIGHT KNEE: Status: ACTIVE | Noted: 2024-08-10

## 2024-11-14 NOTE — PROGRESS NOTES
Glenda Scruggs (: 1965) is a 59 y.o. female, an established patient, is here for evaluation of the following chief complaint(s):  Chief Complaint   Patient presents with    Diabetes    Weight Loss    Results    Knee Pain          ASSESSMENT/PLAN:  Glenda was seen today for diabetes, weight loss, results and knee pain.    Diagnoses and all orders for this visit:    Controlled type 2 diabetes mellitus with hyperglycemia, without long-term current use of insulin ([facility])  -     Basic Metabolic Panel; Future  -     Hemoglobin A1C; Future    Dyslipidemia  -     rosuvastatin (CRESTOR) 5 MG tablet; Take 1 tablet by mouth daily    Arthropathy of right knee    Controlled type 2 diabetes mellitus without complication, without long-term current use of insulin ([facility])  -     metFORMIN (GLUCOPHAGE) 500 MG tablet; Take 1 tablet by mouth 2 times daily (with meals) TAKE ONE TABLET BY MOUTH ONE TIME DAILY AT NIGHT  -     lisinopril (PRINIVIL;ZESTRIL) 5 MG tablet; Take 1 tablet by mouth daily TAKE ONE TABLET BY MOUTH ONE TIME DAILY AT NIGHT  -     semaglutide, 2 MG/DOSE, (OZEMPIC) 8 MG/3ML SOPN sc injection; Inject 2 mg into the skin every 7 days    Gastroesophageal reflux disease, unspecified whether esophagitis present  -     famotidine (PEPCID) 20 MG tablet; Take 1 tablet by mouth daily as needed (acid reflux)    Hot flashes  -     gabapentin (NEURONTIN) 100 MG capsule; Take 1 capsule by mouth nightly for 180 days. For Hot Flashes    Acute pain of right knee      I reviewed recent lab results w/  Ms. Scruggs.      Diabetes is not treated to goal.  Her HgA1C is a little elevated at 7.1%.   Currently prescribed:   Diabetic Medications       Biguanides       metFORMIN (GLUCOPHAGE) 500 MG tablet Take 1 tablet by mouth daily TAKE ONE TABLET BY MOUTH ONE TIME DAILY AT NIGHT       Incretin Mimetic Agents       semaglutide, 2 MG/DOSE, (OZEMPIC) 8 MG/3ML SOPN sc injection Inject 2 mg into the skin every 7 days   And low glycemic

## 2024-11-18 ENCOUNTER — OFFICE VISIT (OUTPATIENT)
Dept: FAMILY MEDICINE CLINIC | Facility: CLINIC | Age: 59
End: 2024-11-18

## 2024-11-18 VITALS
TEMPERATURE: 99 F | RESPIRATION RATE: 14 BRPM | WEIGHT: 198 LBS | HEIGHT: 62 IN | OXYGEN SATURATION: 96 % | DIASTOLIC BLOOD PRESSURE: 86 MMHG | SYSTOLIC BLOOD PRESSURE: 136 MMHG | HEART RATE: 79 BPM | BODY MASS INDEX: 36.44 KG/M2

## 2024-11-18 DIAGNOSIS — E11.65 CONTROLLED TYPE 2 DIABETES MELLITUS WITH HYPERGLYCEMIA, WITHOUT LONG-TERM CURRENT USE OF INSULIN (HCC): Primary | ICD-10-CM

## 2024-11-18 DIAGNOSIS — M25.561 ACUTE PAIN OF RIGHT KNEE: ICD-10-CM

## 2024-11-18 DIAGNOSIS — E78.5 DYSLIPIDEMIA: ICD-10-CM

## 2024-11-18 DIAGNOSIS — R23.2 HOT FLASHES: ICD-10-CM

## 2024-11-18 DIAGNOSIS — K21.9 GASTROESOPHAGEAL REFLUX DISEASE, UNSPECIFIED WHETHER ESOPHAGITIS PRESENT: ICD-10-CM

## 2024-11-18 DIAGNOSIS — M17.11 ARTHROPATHY OF RIGHT KNEE: ICD-10-CM

## 2024-11-18 DIAGNOSIS — E11.9 CONTROLLED TYPE 2 DIABETES MELLITUS WITHOUT COMPLICATION, WITHOUT LONG-TERM CURRENT USE OF INSULIN (HCC): ICD-10-CM

## 2024-11-18 RX ORDER — FAMOTIDINE 20 MG/1
20 TABLET, FILM COATED ORAL DAILY PRN
Qty: 90 TABLET | Refills: 0 | Status: SHIPPED | OUTPATIENT
Start: 2024-11-18

## 2024-11-18 RX ORDER — GABAPENTIN 100 MG/1
100 CAPSULE ORAL NIGHTLY
Qty: 90 CAPSULE | Refills: 0 | Status: SHIPPED | OUTPATIENT
Start: 2024-11-18 | End: 2025-05-17

## 2024-11-18 RX ORDER — LISINOPRIL 5 MG/1
5 TABLET ORAL DAILY
Qty: 90 TABLET | Refills: 0 | Status: SHIPPED | OUTPATIENT
Start: 2024-11-18

## 2024-11-18 RX ORDER — ROSUVASTATIN CALCIUM 5 MG/1
5 TABLET, COATED ORAL DAILY
Qty: 90 TABLET | Refills: 0 | Status: SHIPPED | OUTPATIENT
Start: 2024-11-18

## 2024-11-21 DIAGNOSIS — M23.303 DERANGEMENT OF MEDIAL MENISCUS OF RIGHT KNEE: Primary | ICD-10-CM

## 2024-12-02 ENCOUNTER — TELEPHONE (OUTPATIENT)
Dept: ORTHOPEDIC SURGERY | Age: 59
End: 2024-12-02

## 2024-12-02 DIAGNOSIS — M23.303 DERANGEMENT OF MEDIAL MENISCUS OF RIGHT KNEE: ICD-10-CM

## 2024-12-02 DIAGNOSIS — M17.11 ARTHRITIS OF RIGHT KNEE: ICD-10-CM

## 2024-12-02 DIAGNOSIS — M25.561 RIGHT KNEE PAIN, UNSPECIFIED CHRONICITY: Primary | ICD-10-CM

## 2024-12-02 NOTE — TELEPHONE ENCOUNTER
Spoke with patient and informed her that I will send the MRI to the MRI department and if she doesn't hear from them by Friday to give me a call back.  Patient verbalized understanding and voiced no other concerns at this time.

## 2024-12-02 NOTE — TELEPHONE ENCOUNTER
She has not heard from anyone about scheduling her knee MRI. It has been about ten days. Can you let her know the status please.

## 2024-12-02 NOTE — PROGRESS NOTES
vitamin E 1000 units capsule Take 1 capsule by mouth daily     No current facility-administered medications for this visit.           REVIEW OF SYSTEMS:     CONSTITUTIONAL:   There is no history of fever, chills, night sweats, weight loss, weight gain, persistent fatigue, or lethargy/hypersomnolence.   EYES:   Denies problems with eye pain, erythema, blurred vision, or visual field loss.   ENTM:   Denies history of tinnitus, epistaxis, sore throat, hoarseness, or dysphonia.   LYMPH:   Denies swollen glands.   CARDIAC:   No chest pain, pressure, discomfort, palpitations, orthopnea, murmurs, or edema.   GI:   No dysphagia, heartburn reflux, nausea/vomiting, diarrhea, abdominal pain, or bleeding.   :   Denies history of dysuria, hematuria, polyuria, or decreased urine output.   MS:   No history of myalgias, arthralgias, bone pain, or muscle cramps.   SKIN:   No history of rashes, jaundice, cyanosis, nodules, or ulcers.   ENDO:   Negative for heat or cold intolerance.  No history of DM.   PSYCH:   Negative for anxiety, depression, insomnia, hallucinations.   NEURO:   There is no history of AMS, persistent headache, decreased level of consciousness, seizures, or motor or sensory deficits.      PHYSICAL EXAM:    Vitals:    12/04/24 1007   BP: 108/75   Pulse: 75   Resp: 19   Temp: 97.6 °F (36.4 °C)   SpO2: 96%            GENERAL APPEARANCE:   The patient is normal weight and in no respiratory distress.   HEENT:   PERRL.  Conjunctivae unremarkable.   Nasal mucosa is without epistaxis, exudate, or polyps.  Gums and dentition are unremarkable.  There is severe oropharyngeal narrowing.  She is Mallampati 3   NECK/LYMPHATIC:   Symmetrical with no elevation of jugular venous pulsation.  Trachea midline. No thyroid enlargement.  No cervical adenopathy.   LUNGS:   Normal respiratory effort with symmetrical lung expansion.   Breath sounds are clear bilaterally.   HEART:   There is a regular rate and rhythm.  No murmur, rub, or

## 2024-12-04 ENCOUNTER — OFFICE VISIT (OUTPATIENT)
Dept: SLEEP MEDICINE | Age: 59
End: 2024-12-04
Payer: COMMERCIAL

## 2024-12-04 VITALS
HEART RATE: 75 BPM | TEMPERATURE: 97.6 F | BODY MASS INDEX: 36.62 KG/M2 | OXYGEN SATURATION: 96 % | WEIGHT: 199 LBS | SYSTOLIC BLOOD PRESSURE: 108 MMHG | DIASTOLIC BLOOD PRESSURE: 75 MMHG | RESPIRATION RATE: 19 BRPM | HEIGHT: 62 IN

## 2024-12-04 DIAGNOSIS — G47.00 PERSISTENT DISORDER OF INITIATING OR MAINTAINING SLEEP: ICD-10-CM

## 2024-12-04 DIAGNOSIS — G47.33 OSA ON CPAP: Primary | ICD-10-CM

## 2024-12-04 PROCEDURE — G8427 DOCREV CUR MEDS BY ELIG CLIN: HCPCS | Performed by: INTERNAL MEDICINE

## 2024-12-04 PROCEDURE — G8484 FLU IMMUNIZE NO ADMIN: HCPCS | Performed by: INTERNAL MEDICINE

## 2024-12-04 PROCEDURE — 3017F COLORECTAL CA SCREEN DOC REV: CPT | Performed by: INTERNAL MEDICINE

## 2024-12-04 PROCEDURE — 1036F TOBACCO NON-USER: CPT | Performed by: INTERNAL MEDICINE

## 2024-12-04 PROCEDURE — G2211 COMPLEX E/M VISIT ADD ON: HCPCS | Performed by: INTERNAL MEDICINE

## 2024-12-04 PROCEDURE — G8417 CALC BMI ABV UP PARAM F/U: HCPCS | Performed by: INTERNAL MEDICINE

## 2024-12-04 PROCEDURE — 99214 OFFICE O/P EST MOD 30 MIN: CPT | Performed by: INTERNAL MEDICINE

## 2024-12-04 ASSESSMENT — SLEEP AND FATIGUE QUESTIONNAIRES
HOW LIKELY ARE YOU TO NOD OFF OR FALL ASLEEP WHILE SITTING AND TALKING TO SOMEONE: WOULD NEVER DOZE
HOW LIKELY ARE YOU TO NOD OFF OR FALL ASLEEP IN A CAR, WHILE STOPPED FOR A FEW MINUTES IN TRAFFIC: WOULD NEVER DOZE
HOW LIKELY ARE YOU TO NOD OFF OR FALL ASLEEP WHILE SITTING QUIETLY AFTER LUNCH WITHOUT ALCOHOL: WOULD NEVER DOZE
HOW LIKELY ARE YOU TO NOD OFF OR FALL ASLEEP WHEN YOU ARE A PASSENGER IN A CAR FOR AN HOUR WITHOUT A BREAK: WOULD NEVER DOZE
HOW LIKELY ARE YOU TO NOD OFF OR FALL ASLEEP WHILE SITTING INACTIVE IN A PUBLIC PLACE: WOULD NEVER DOZE
HOW LIKELY ARE YOU TO NOD OFF OR FALL ASLEEP WHILE SITTING AND READING: WOULD NEVER DOZE
ESS TOTAL SCORE: 0
HOW LIKELY ARE YOU TO NOD OFF OR FALL ASLEEP WHILE WATCHING TV: WOULD NEVER DOZE
HOW LIKELY ARE YOU TO NOD OFF OR FALL ASLEEP WHILE LYING DOWN TO REST IN THE AFTERNOON WHEN CIRCUMSTANCES PERMIT: WOULD NEVER DOZE

## 2024-12-09 ENCOUNTER — HOSPITAL ENCOUNTER (OUTPATIENT)
Dept: MRI IMAGING | Age: 59
Discharge: HOME | End: 2024-12-12
Attending: ORTHOPAEDIC SURGERY
Payer: COMMERCIAL

## 2024-12-09 DIAGNOSIS — M25.561 RIGHT KNEE PAIN, UNSPECIFIED CHRONICITY: ICD-10-CM

## 2024-12-09 DIAGNOSIS — M17.11 ARTHRITIS OF RIGHT KNEE: ICD-10-CM

## 2024-12-09 PROCEDURE — 73721 MRI JNT OF LWR EXTRE W/O DYE: CPT

## 2024-12-12 ENCOUNTER — TELEPHONE (OUTPATIENT)
Dept: ORTHOPEDIC SURGERY | Age: 59
End: 2024-12-12

## 2024-12-12 NOTE — TELEPHONE ENCOUNTER
She got her MRI results in [facility] but she is wondering if she will get a phone call or if she needs to come back in.

## 2024-12-13 ENCOUNTER — HOSPITAL ENCOUNTER (EMERGENCY)
Age: 59
Discharge: HOME | End: 2024-12-13
Attending: EMERGENCY MEDICINE
Payer: COMMERCIAL

## 2024-12-13 ENCOUNTER — APPOINTMENT (OUTPATIENT)
Dept: GENERAL RADIOLOGY | Age: 59
End: 2024-12-13
Payer: COMMERCIAL

## 2024-12-13 ENCOUNTER — TELEPHONE (OUTPATIENT)
Dept: ORTHOPEDIC SURGERY | Age: 59
End: 2024-12-13

## 2024-12-13 VITALS
RESPIRATION RATE: 16 BRPM | TEMPERATURE: 98.2 F | WEIGHT: 200 LBS | BODY MASS INDEX: 36.8 KG/M2 | SYSTOLIC BLOOD PRESSURE: 117 MMHG | OXYGEN SATURATION: 97 % | HEIGHT: 62 IN | DIASTOLIC BLOOD PRESSURE: 80 MMHG | HEART RATE: 85 BPM

## 2024-12-13 DIAGNOSIS — S80.01XA CONTUSION OF RIGHT KNEE, INITIAL ENCOUNTER: Primary | ICD-10-CM

## 2024-12-13 PROCEDURE — 99283 EMERGENCY DEPT VISIT LOW MDM: CPT

## 2024-12-13 PROCEDURE — 73562 X-RAY EXAM OF KNEE 3: CPT

## 2024-12-13 ASSESSMENT — PAIN DESCRIPTION - LOCATION: LOCATION: KNEE

## 2024-12-13 ASSESSMENT — PAIN SCALES - GENERAL: PAINLEVEL_OUTOF10: 7

## 2024-12-13 ASSESSMENT — PAIN DESCRIPTION - ORIENTATION: ORIENTATION: RIGHT

## 2024-12-13 NOTE — TELEPHONE ENCOUNTER
Spoke with patient and informed her that Dr. Collado is in surgery but I will send a message regarding her fall today and the MRI results. I informed the patient the Dr. Collado may call her this afternoon once he's out of surgery. Patient was scheduled for an appointment on 12/19/24 @ 830 am at [facility] as well.  Patient verbalized understanding and voiced no other concerns at this time.

## 2024-12-13 NOTE — TELEPHONE ENCOUNTER
She is calling again to report she slipped and fell down her stairs on that bad knee. She went to the ER at [place] so it should all be in the chart. Please call and give her an update.

## 2024-12-13 NOTE — ED NOTES
Patient mobility status  with no difficulty.     I have reviewed discharge instructions with the patient.  The patient verbalized understanding.    Patient left ED via Discharge Method: ambulatory to Home with  family .    Opportunity for questions and clarification provided.     Patient given 0 scripts.

## 2024-12-13 NOTE — ED TRIAGE NOTES
Patient  arrives ambulatory to the ED with . Pt. Reports a slip down stairs this morning. She reports pain to right knee and previous meniscus injury to same knee in August.

## 2024-12-13 NOTE — DISCHARGE INSTRUCTIONS
Continue wearing knee brace.    Follow-up with [facility].  No instability consistent with acute ligamentous injury

## 2024-12-13 NOTE — ED PROVIDER NOTES
Emergency Department Provider Note       PCP: Marianela Bailey PA   Age: 59 y.o.   Sex: female     DISPOSITION    No diagnosis found.    Medical Decision Making     ***     {Complexity:14273}  {Risk:52120}  I independently ordered and reviewed each unique test.    {external source:37020}   {Historian (state who, why needed, what they said):08035}  {Rhythm Strip:40039}  {test reviewed:95283}  {EK}  {Admitted or Consultants involved.:33091}  {MIPS URI - Strep - Sinusitis - Pregnant - Head Trauma - Overdose - Agitation:26772}  {SEP1 yes/no:41799}  {Critical Care:56419}    History     Patient with some right knee pain.  She has pre-existing knee injury that occurred in August of this year she had an ankle had an MRI done on this prior Monday and has no meniscus injury.  She had a refall on some steps when she was walking down with socks on.  She now has some pain to lateral aspect and thigh region.  She is able ambulate without significant difficulty other than soreness.  No present joint effusion.  Patient with [facility]        Physical Exam     Vitals signs and nursing note reviewed:  Vitals:    24 0953 24 0954   BP:  112/87   Pulse:  80   Resp:  18   Temp:  98.2 °F (36.8 °C)   TempSrc:  Oral   SpO2:  95%   Weight: 90.7 kg (200 lb)    Height: 1.575 m (5' 2\")       Physical Exam   Procedures     Procedures    Orders placed during this emergency department visit:     Orders Placed This Encounter   Procedures    XR KNEE RIGHT (3 VIEWS)        Medications given during this emergency department visit:   Medications - No data to display    New prescriptions:     New Prescriptions    No medications on file        Past History and Complexity:     Past Medical History:   Diagnosis Date    Adverse effect of anesthesia     delayed awakening; pt reports also teary post-op    Anxiety     mananged with meds    Depression     Diabetes (HCC)     Type 2, oral meds, ozempic, last A1C 6.5 in  education level: None   Occupational History    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)   Tobacco Use    Smoking status: Never    Smokeless tobacco: Never   Vaping Use    Vaping status: Never Used   Substance and Sexual Activity    Alcohol use: Yes     Alcohol/week: 2.0 standard drinks of alcohol     Types: 1 Glasses of wine, 1 Shots of liquor per week     Comment: Social, not every week    Drug use: No    Sexual activity: Yes     Partners: Male     Social Determinants of Health     Financial Resource Strain: Low Risk  (8/7/2024)    Overall Financial Resource Strain (CARDIA)     Difficulty of Paying Living Expenses: Not hard at all   Food Insecurity: No Food Insecurity (8/7/2024)    Hunger Vital Sign     Worried About Running Out of Food in the Last Year: Never true     Ran Out of Food in the Last Year: Never true   Transportation Needs: Unknown (8/7/2024)    PRAPARE - Transportation     Lack of Transportation (Non-Medical): No   Social Connections: Unknown (1/4/2022)    Received from [facility]    Social Connections     Frequency of Communication with Friends and Family: Not asked     Frequency of Social Gatherings with Friends and Family: Not asked   Intimate Partner Violence: Unknown (1/4/2022)    Received from [facility]    Intimate Partner Violence     Fear of Current or Ex-Partner: Not asked     Emotionally Abused: Not asked     Physically Abused: Not asked     Sexually Abused: Not asked   Housing Stability: Unknown (8/7/2024)    Housing Stability Vital Sign     Unstable Housing in the Last Year: No        Previous Medications    BACILLUS COAGULANS-INULIN (PROBIOTIC) 1-250 BILLION-MG CAPS    Take by mouth daily    CYANOCOBALAMIN 1000 MCG TABLET    Take 1 tablet by mouth daily    FAMOTIDINE (PEPCID) 20 MG TABLET    Take 1 tablet by mouth daily as needed (acid reflux)    GABAPENTIN (NEURONTIN) 100 MG CAPSULE    Take 1 capsule by mouth

## 2024-12-16 NOTE — TELEPHONE ENCOUNTER
Spoke with patient and informed her that Dr. Collado will try and call her later on today. Patient verbalized understanding and voiced no other concerns at this time.

## 2024-12-19 ENCOUNTER — OFFICE VISIT (OUTPATIENT)
Dept: ORTHOPEDIC SURGERY | Age: 59
End: 2024-12-19
Payer: COMMERCIAL

## 2024-12-19 VITALS — WEIGHT: 200.6 LBS | HEIGHT: 62 IN | BODY MASS INDEX: 36.91 KG/M2

## 2024-12-19 DIAGNOSIS — S83.231D COMPLEX TEAR OF MEDIAL MENISCUS OF RIGHT KNEE AS CURRENT INJURY, SUBSEQUENT ENCOUNTER: ICD-10-CM

## 2024-12-19 DIAGNOSIS — M17.11 ARTHRITIS OF RIGHT KNEE: Primary | ICD-10-CM

## 2024-12-19 PROCEDURE — G8427 DOCREV CUR MEDS BY ELIG CLIN: HCPCS | Performed by: ORTHOPAEDIC SURGERY

## 2024-12-19 PROCEDURE — G8417 CALC BMI ABV UP PARAM F/U: HCPCS | Performed by: ORTHOPAEDIC SURGERY

## 2024-12-19 PROCEDURE — 3017F COLORECTAL CA SCREEN DOC REV: CPT | Performed by: ORTHOPAEDIC SURGERY

## 2024-12-19 PROCEDURE — 99214 OFFICE O/P EST MOD 30 MIN: CPT | Performed by: ORTHOPAEDIC SURGERY

## 2024-12-19 PROCEDURE — 1036F TOBACCO NON-USER: CPT | Performed by: ORTHOPAEDIC SURGERY

## 2024-12-19 PROCEDURE — G8484 FLU IMMUNIZE NO ADMIN: HCPCS | Performed by: ORTHOPAEDIC SURGERY

## 2024-12-19 NOTE — PROGRESS NOTES
Patient ID:  Glenda Scruggs  [id number]  59 y.o.  1965    Today: December 19, 2024          Chief Complaint:  Right Knee pain    HPI:       Glenda Scruggs is a 59 y.o. female seen for evaluation and treatment of right knee pain. She still has functionally limiting pain.  We reviewed her MRI findings and treatment options.     Past Medical History:  Past Medical History:   Diagnosis Date    Adverse effect of anesthesia     delayed awakening; pt reports also teary post-op    Anxiety     mananged with meds    Depression     Diabetes (HCC)     Type 2, oral meds, ozempic, last A1C 6.5 in 12/2023, denies hypo    Headache     History of kidney stones     Hypertension     managed with meds    Thyroid disease        Past Surgical History:  Past Surgical History:   Procedure Laterality Date    BREAST REDUCTION SURGERY  1999    COLONOSCOPY  2017    DILATION AND CURETTAGE OF UTERUS  10/2017    endometrial ablation    FRAGMENT KIDNEY STONE/ ESWL      HEENT      GUM SURGURY    HEENT  2018    sinus    HERNIA REPAIR Bilateral 1/22/2024    HERNIA INGUINAL REPAIR LAPAROSCOPIC ROBOTIC performed by Cherelle Kennedy MD at [facility] [place] [facility]IN OR    INGUINAL HERNIA REPAIR Bilateral 01/22/2024    SHOULDER ARTHROSCOPY Left     Slap tear repair    TRABECULECTOMY  2011    Dr. Anguiano        Medications:     Prior to Admission medications    Medication Sig Start Date End Date Taking? Authorizing Provider   metFORMIN (GLUCOPHAGE) 500 MG tablet Take 1 tablet by mouth 2 times daily (with meals) TAKE ONE TABLET BY MOUTH ONE TIME DAILY AT NIGHT 11/18/24   Marianela Bailey PA   famotidine (PEPCID) 20 MG tablet Take 1 tablet by mouth daily as needed (acid reflux) 11/18/24   Marianela Bailey PA   gabapentin (NEURONTIN) 100 MG capsule Take 1 capsule by mouth nightly for 180 days. For Hot Flashes 11/18/24 5/17/25  Marianela Bailey PA   lisinopril (PRINIVIL;ZESTRIL) 5 MG tablet Take 1 tablet by mouth daily TAKE

## 2024-12-27 ENCOUNTER — HOSPITAL ENCOUNTER (OUTPATIENT)
Dept: GENERAL RADIOLOGY | Age: 59
Discharge: HOME | End: 2024-12-30

## 2024-12-30 ENCOUNTER — HOSPITAL ENCOUNTER (OUTPATIENT)
Dept: GENERAL RADIOLOGY | Age: 59
Discharge: HOME | End: 2025-01-02
Payer: COMMERCIAL

## 2024-12-30 DIAGNOSIS — M25.561 RIGHT KNEE PAIN, UNSPECIFIED CHRONICITY: ICD-10-CM

## 2024-12-30 PROCEDURE — 73562 X-RAY EXAM OF KNEE 3: CPT

## 2025-01-13 ENCOUNTER — OFFICE VISIT (OUTPATIENT)
Dept: ORTHOPEDIC SURGERY | Age: 60
End: 2025-01-13
Payer: COMMERCIAL

## 2025-01-13 DIAGNOSIS — S83.231A COMPLEX TEAR OF MEDIAL MENISCUS OF RIGHT KNEE AS CURRENT INJURY, INITIAL ENCOUNTER: ICD-10-CM

## 2025-01-13 DIAGNOSIS — M17.11 PRIMARY OSTEOARTHRITIS OF RIGHT KNEE: Primary | ICD-10-CM

## 2025-01-13 PROCEDURE — 1036F TOBACCO NON-USER: CPT | Performed by: ORTHOPAEDIC SURGERY

## 2025-01-13 PROCEDURE — G8417 CALC BMI ABV UP PARAM F/U: HCPCS | Performed by: ORTHOPAEDIC SURGERY

## 2025-01-13 PROCEDURE — G8427 DOCREV CUR MEDS BY ELIG CLIN: HCPCS | Performed by: ORTHOPAEDIC SURGERY

## 2025-01-13 PROCEDURE — 3017F COLORECTAL CA SCREEN DOC REV: CPT | Performed by: ORTHOPAEDIC SURGERY

## 2025-01-13 PROCEDURE — 99214 OFFICE O/P EST MOD 30 MIN: CPT | Performed by: ORTHOPAEDIC SURGERY

## 2025-01-13 NOTE — PROGRESS NOTES
Name: Glenda Scruggs  YOB: 1965  Gender: female  MRN: [id number]      What: Right knee pain  How: Dating back to August 2024      Referring provider: Dr. Collado    HPI: Glenda Scruggs is a 59 y.o. female seen at the request of Dr. Collado for problems related to her right knee.  She has a history of hypertension, diabetes mellitus, hypercholesterolemia and hypothyroidism.  Her BMI is over 36.  I have operated on her shoulder previously.  She denies any current left knee problems.  She notes the onset of right knee pain in August.  No injury.  She complains of pain and swelling.  She denies any locking or catching.  She went to the emergency room.  She is seeing Dr. Collado.  She received a cortisone injection without improvement.  She has had an MRI of the right knee.  She recently had weightbearing films by her chiropractor.      ROS/Meds/PSH/PMH/FH/SH: A ten system review of systems was performed and is negative other than what is in the HPI.   Tobacco:  reports that she has never smoked. She has never used smokeless tobacco.  There were no vitals taken for this visit.     Physical Examination:  She is an awake alert pleasant female ambulating with an antalgic gait on the right side    The left knee has a range of motion of 0 to 135 degrees  negative Lachman,  negative anterior drawer,   negative posterior drawer  negative pivot.   Good tibial step-off,   No varus or valgus laxity at 0 or 30 degrees.   Negative lateral joint line tenderness   negative lateral Yenny.   Negative medial joint line tenderness  negative medial Yenny.   No evidence of any posterolateral instability.   No patellofemoral pain.   Negative compression,   negative apprehension  no effusion.   Calves Are non-tender,  neurovascularly patient is intact.   Negative Homans.   MPFL is non-tender.   Patient Can fully extend the knee.   Good quad tone  No erythema.   Negative Dial test.    The right knee has a

## 2025-01-16 ENCOUNTER — OFFICE VISIT (OUTPATIENT)
Dept: BEHAVIORAL/MENTAL HEALTH CLINIC | Age: 60
End: 2025-01-16
Payer: COMMERCIAL

## 2025-01-16 VITALS — SYSTOLIC BLOOD PRESSURE: 122 MMHG | OXYGEN SATURATION: 96 % | HEART RATE: 78 BPM | DIASTOLIC BLOOD PRESSURE: 78 MMHG

## 2025-01-16 DIAGNOSIS — F33.42 MDD (MAJOR DEPRESSIVE DISORDER), RECURRENT, IN FULL REMISSION (HCC): Primary | ICD-10-CM

## 2025-01-16 PROCEDURE — 1036F TOBACCO NON-USER: CPT | Performed by: STUDENT IN AN ORGANIZED HEALTH CARE EDUCATION/TRAINING PROGRAM

## 2025-01-16 PROCEDURE — G8427 DOCREV CUR MEDS BY ELIG CLIN: HCPCS | Performed by: STUDENT IN AN ORGANIZED HEALTH CARE EDUCATION/TRAINING PROGRAM

## 2025-01-16 PROCEDURE — 3017F COLORECTAL CA SCREEN DOC REV: CPT | Performed by: STUDENT IN AN ORGANIZED HEALTH CARE EDUCATION/TRAINING PROGRAM

## 2025-01-16 PROCEDURE — 99213 OFFICE O/P EST LOW 20 MIN: CPT | Performed by: STUDENT IN AN ORGANIZED HEALTH CARE EDUCATION/TRAINING PROGRAM

## 2025-01-16 PROCEDURE — G8417 CALC BMI ABV UP PARAM F/U: HCPCS | Performed by: STUDENT IN AN ORGANIZED HEALTH CARE EDUCATION/TRAINING PROGRAM

## 2025-01-16 RX ORDER — VILAZODONE HYDROCHLORIDE 40 MG/1
40 TABLET ORAL DAILY
Qty: 90 TABLET | Refills: 1 | Status: SHIPPED | OUTPATIENT
Start: 2025-01-16 | End: 2025-07-15

## 2025-01-16 ASSESSMENT — PATIENT HEALTH QUESTIONNAIRE - PHQ9
3. TROUBLE FALLING OR STAYING ASLEEP: NOT AT ALL
9. THOUGHTS THAT YOU WOULD BE BETTER OFF DEAD, OR OF HURTING YOURSELF: NOT AT ALL
7. TROUBLE CONCENTRATING ON THINGS, SUCH AS READING THE NEWSPAPER OR WATCHING TELEVISION: NOT AT ALL
10. IF YOU CHECKED OFF ANY PROBLEMS, HOW DIFFICULT HAVE THESE PROBLEMS MADE IT FOR YOU TO DO YOUR WORK, TAKE CARE OF THINGS AT HOME, OR GET ALONG WITH OTHER PEOPLE: NOT DIFFICULT AT ALL
8. MOVING OR SPEAKING SO SLOWLY THAT OTHER PEOPLE COULD HAVE NOTICED. OR THE OPPOSITE, BEING SO FIGETY OR RESTLESS THAT YOU HAVE BEEN MOVING AROUND A LOT MORE THAN USUAL: NOT AT ALL
2. FEELING DOWN, DEPRESSED OR HOPELESS: NOT AT ALL
SUM OF ALL RESPONSES TO PHQ QUESTIONS 1-9: 1
6. FEELING BAD ABOUT YOURSELF - OR THAT YOU ARE A FAILURE OR HAVE LET YOURSELF OR YOUR FAMILY DOWN: NOT AT ALL
1. LITTLE INTEREST OR PLEASURE IN DOING THINGS: NOT AT ALL
5. POOR APPETITE OR OVEREATING: NOT AT ALL
SUM OF ALL RESPONSES TO PHQ QUESTIONS 1-9: 1
SUM OF ALL RESPONSES TO PHQ9 QUESTIONS 1 & 2: 0
4. FEELING TIRED OR HAVING LITTLE ENERGY: SEVERAL DAYS
SUM OF ALL RESPONSES TO PHQ QUESTIONS 1-9: 1
SUM OF ALL RESPONSES TO PHQ QUESTIONS 1-9: 1

## 2025-01-16 NOTE — PROGRESS NOTES
profiles, risks, and benefits were discussed with the patient.    - Patient encouraged to contact the clinic if experiencing any adverse reactions with medications.      Other Recommendations:    - established with [facility] for therapy    - If patient has any concerns for their own safety due to adverse reactions to medications, suicidal or homicidal ideations, auditory or visual hallucinations, or delusions, they have been told to call 911 or go to the nearest emergency department.    RTC: 6 mo      Hector Marx MD    1/16/2025 8:58 AM    [facility]

## 2025-01-17 ENCOUNTER — OFFICE VISIT (OUTPATIENT)
Dept: ORTHOPEDIC SURGERY | Age: 60
End: 2025-01-17
Payer: COMMERCIAL

## 2025-01-17 VITALS — HEIGHT: 62 IN | BODY MASS INDEX: 37.06 KG/M2 | WEIGHT: 201.4 LBS

## 2025-01-17 DIAGNOSIS — M17.11 PRIMARY OSTEOARTHRITIS OF RIGHT KNEE: Primary | ICD-10-CM

## 2025-01-17 PROCEDURE — 99214 OFFICE O/P EST MOD 30 MIN: CPT | Performed by: ORTHOPAEDIC SURGERY

## 2025-01-17 PROCEDURE — G8417 CALC BMI ABV UP PARAM F/U: HCPCS | Performed by: ORTHOPAEDIC SURGERY

## 2025-01-17 PROCEDURE — G8427 DOCREV CUR MEDS BY ELIG CLIN: HCPCS | Performed by: ORTHOPAEDIC SURGERY

## 2025-01-17 PROCEDURE — 1036F TOBACCO NON-USER: CPT | Performed by: ORTHOPAEDIC SURGERY

## 2025-01-17 PROCEDURE — 3017F COLORECTAL CA SCREEN DOC REV: CPT | Performed by: ORTHOPAEDIC SURGERY

## 2025-01-17 NOTE — PROGRESS NOTES
Patient ID:  Glenda Scruggs  [id number]  59 y.o.  1965    Today: January 17, 2025          Chief Complaint:  Right Knee pain    HPI:       Glenda Scruggs is a 59 y.o. female seen regarding her right knee again.  She was seen by Dr. Pillai as a second opinion for possible arthroscopic treatment.  He recommended total knee arthroplasty and I agree.  She presents for some questions regarding total knee arthroplasty as well as however he and expectation  Past Medical History:  Past Medical History:   Diagnosis Date    Adverse effect of anesthesia     delayed awakening; pt reports also teary post-op    Anxiety     mananged with meds    Depression     Diabetes (HCC)     Type 2, oral meds, ozempic, last A1C 6.5 in 12/2023, denies hypo    Headache     History of kidney stones     Hypertension     managed with meds    Thyroid disease        Past Surgical History:  Past Surgical History:   Procedure Laterality Date    BREAST REDUCTION SURGERY  1999    COLONOSCOPY  2017    DILATION AND CURETTAGE OF UTERUS  10/2017    endometrial ablation    FRAGMENT KIDNEY STONE/ ESWL      HEENT      GUM SURGURY    HEENT  2018    sinus    HERNIA REPAIR Bilateral 1/22/2024    HERNIA INGUINAL REPAIR LAPAROSCOPIC ROBOTIC performed by Cherelle Kennedy MD at [facility] [place] MAIN OR    INGUINAL HERNIA REPAIR Bilateral 01/22/2024    SHOULDER ARTHROSCOPY Left     Slap tear repair    TRABECULECTOMY  2011    Dr. Anguiano        Medications:     Prior to Admission medications    Medication Sig Start Date End Date Taking? Authorizing Provider   vilazodone HCl (VIIBRYD) 40 MG TABS Take 1 tablet by mouth daily 1/16/25 7/15/25  Hector Marx MD   metFORMIN (GLUCOPHAGE) 500 MG tablet Take 1 tablet by mouth 2 times daily (with meals) TAKE ONE TABLET BY MOUTH ONE TIME DAILY AT NIGHT 11/18/24   Marianela Bailey PA   famotidine (PEPCID) 20 MG tablet Take 1 tablet by mouth daily as needed (acid reflux) 11/18/24   Lynn

## 2025-01-20 ENCOUNTER — TRANSCRIBE ORDERS (OUTPATIENT)
Dept: SCHEDULING | Age: 60
End: 2025-01-20

## 2025-01-20 DIAGNOSIS — Z12.31 OTHER SCREENING MAMMOGRAM: Primary | ICD-10-CM

## 2025-01-27 ENCOUNTER — PREP FOR PROCEDURE (OUTPATIENT)
Dept: ORTHOPEDIC SURGERY | Age: 60
End: 2025-01-27

## 2025-01-30 ENCOUNTER — PREP FOR PROCEDURE (OUTPATIENT)
Dept: ORTHOPEDIC SURGERY | Age: 60
End: 2025-01-30

## 2025-01-30 DIAGNOSIS — M17.11 PRIMARY OSTEOARTHRITIS OF RIGHT KNEE: Primary | ICD-10-CM

## 2025-01-30 RX ORDER — SODIUM CHLORIDE 9 MG/ML
INJECTION, SOLUTION INTRAVENOUS PRN
Status: CANCELLED | OUTPATIENT
Start: 2025-01-30

## 2025-01-30 RX ORDER — SODIUM CHLORIDE 0.9 % (FLUSH) 0.9 %
5-40 SYRINGE (ML) INJECTION PRN
Status: CANCELLED | OUTPATIENT
Start: 2025-01-30

## 2025-01-30 RX ORDER — ACETAMINOPHEN 325 MG/1
1000 TABLET ORAL ONCE
Status: CANCELLED | OUTPATIENT
Start: 2025-01-30 | End: 2025-01-30

## 2025-01-30 RX ORDER — SODIUM CHLORIDE 0.9 % (FLUSH) 0.9 %
5-40 SYRINGE (ML) INJECTION EVERY 12 HOURS SCHEDULED
Status: CANCELLED | OUTPATIENT
Start: 2025-01-30

## 2025-01-30 NOTE — H&P
Patient ID:  Glenda Scruggs  [id number]  59 y.o.  1965    Today: January 30, 2025          Chief Complaint:  Right Knee pain    HPI:       Glenda Scruggs is a 59 y.o. female seen regarding her right knee again.  She was seen by Dr. Pillai as a second opinion for possible arthroscopic treatment.  He recommended total knee arthroplasty and I agree.  She presents for some questions regarding total knee arthroplasty as well as however he and expectation  Past Medical History:  Past Medical History:   Diagnosis Date    Adverse effect of anesthesia     delayed awakening; pt reports also teary post-op    Anxiety     mananged with meds    Depression     Diabetes (HCC)     Type 2, oral meds, ozempic, last A1C 6.5 in 12/2023, denies hypo    Headache     History of kidney stones     Hypertension     managed with meds    Thyroid disease        Past Surgical History:  Past Surgical History:   Procedure Laterality Date    BREAST REDUCTION SURGERY  1999    COLONOSCOPY  2017    DILATION AND CURETTAGE OF UTERUS  10/2017    endometrial ablation    FRAGMENT KIDNEY STONE/ ESWL      HEENT      GUM SURGURY    HEENT  2018    sinus    HERNIA REPAIR Bilateral 1/22/2024    HERNIA INGUINAL REPAIR LAPAROSCOPIC ROBOTIC performed by Cherelle Kennedy MD at [facility] MAIN OR    INGUINAL HERNIA REPAIR Bilateral 01/22/2024    SHOULDER ARTHROSCOPY Left     Slap tear repair    TRABECULECTOMY  2011    Dr. Anguiano        Medications:     Prior to Admission medications    Medication Sig Start Date End Date Taking? Authorizing Provider   vilazodone HCl (VIIBRYD) 40 MG TABS Take 1 tablet by mouth daily 1/16/25 7/15/25  Hector Marx MD   metFORMIN (GLUCOPHAGE) 500 MG tablet Take 1 tablet by mouth 2 times daily (with meals) TAKE ONE TABLET BY MOUTH ONE TIME DAILY AT NIGHT 11/18/24   Marianela Bailey PA   famotidine (PEPCID) 20 MG tablet Take 1 tablet by mouth daily as needed (acid reflux) 11/18/24   Lynn

## 2025-02-04 ENCOUNTER — HOSPITAL ENCOUNTER (OUTPATIENT)
Dept: SURGERY | Age: 60
Discharge: HOME | End: 2025-02-07
Payer: COMMERCIAL

## 2025-02-04 ENCOUNTER — HOSPITAL ENCOUNTER (OUTPATIENT)
Dept: REHABILITATION | Age: 60
Discharge: HOME | End: 2025-02-07
Payer: COMMERCIAL

## 2025-02-04 VITALS
TEMPERATURE: 97.6 F | BODY MASS INDEX: 36.62 KG/M2 | RESPIRATION RATE: 18 BRPM | DIASTOLIC BLOOD PRESSURE: 88 MMHG | SYSTOLIC BLOOD PRESSURE: 133 MMHG | HEART RATE: 82 BPM | WEIGHT: 199 LBS | OXYGEN SATURATION: 96 % | HEIGHT: 62 IN

## 2025-02-04 DIAGNOSIS — M17.11 PRIMARY OSTEOARTHRITIS OF RIGHT KNEE: ICD-10-CM

## 2025-02-04 LAB
ALBUMIN SERPL-MCNC: 3.5 G/DL (ref 3.5–5)
ALBUMIN/GLOB SERPL: 1 (ref 1–1.9)
ALP SERPL-CCNC: 96 U/L (ref 35–104)
ALT SERPL-CCNC: 16 U/L (ref 8–45)
ANION GAP SERPL CALC-SCNC: 15 MMOL/L (ref 7–16)
AST SERPL-CCNC: 19 U/L (ref 15–37)
BASOPHILS # BLD: 0.03 K/UL (ref 0–0.2)
BASOPHILS NFR BLD: 0.4 % (ref 0–2)
BILIRUB SERPL-MCNC: <0.2 MG/DL (ref 0–1.2)
BUN SERPL-MCNC: 19 MG/DL (ref 6–23)
CALCIUM SERPL-MCNC: 9.2 MG/DL (ref 8.8–10.2)
CHLORIDE SERPL-SCNC: 102 MMOL/L (ref 98–107)
CO2 SERPL-SCNC: 22 MMOL/L (ref 20–29)
CREAT SERPL-MCNC: 0.88 MG/DL (ref 0.6–1.1)
DIFFERENTIAL METHOD BLD: ABNORMAL
EKG ATRIAL RATE: 72 BPM
EKG DIAGNOSIS: NORMAL
EKG P AXIS: 35 DEGREES
EKG P-R INTERVAL: 142 MS
EKG Q-T INTERVAL: 400 MS
EKG QRS DURATION: 76 MS
EKG QTC CALCULATION (BAZETT): 438 MS
EKG R AXIS: -18 DEGREES
EKG T AXIS: 30 DEGREES
EKG VENTRICULAR RATE: 72 BPM
EOSINOPHIL # BLD: 0.14 K/UL (ref 0–0.8)
EOSINOPHIL NFR BLD: 2.1 % (ref 0.5–7.8)
ERYTHROCYTE [DISTWIDTH] IN BLOOD BY AUTOMATED COUNT: 13.6 % (ref 11.9–14.6)
EST. AVERAGE GLUCOSE BLD GHB EST-MCNC: 152 MG/DL
GLOBULIN SER CALC-MCNC: 3.6 G/DL (ref 2.3–3.5)
GLUCOSE SERPL-MCNC: 173 MG/DL (ref 70–99)
HBA1C MFR BLD: 6.9 % (ref 0–5.6)
HCT VFR BLD AUTO: 42.3 % (ref 35.8–46.3)
HGB BLD-MCNC: 13.4 G/DL (ref 11.7–15.4)
IMM GRANULOCYTES # BLD AUTO: 0.02 K/UL (ref 0–0.5)
IMM GRANULOCYTES NFR BLD AUTO: 0.3 % (ref 0–5)
INR PPP: 1
LYMPHOCYTES # BLD: 1.08 K/UL (ref 0.5–4.6)
LYMPHOCYTES NFR BLD: 15.8 % (ref 13–44)
MCH RBC QN AUTO: 26.9 PG (ref 26.1–32.9)
MCHC RBC AUTO-ENTMCNC: 31.7 G/DL (ref 31.4–35)
MCV RBC AUTO: 84.8 FL (ref 82–102)
MONOCYTES # BLD: 0.45 K/UL (ref 0.1–1.3)
MONOCYTES NFR BLD: 6.6 % (ref 4–12)
MRSA DNA SPEC QL NAA+PROBE: NOT DETECTED
NEUTS SEG # BLD: 5.1 K/UL (ref 1.7–8.2)
NEUTS SEG NFR BLD: 74.8 % (ref 43–78)
NRBC # BLD: 0 K/UL (ref 0–0.2)
PLATELET # BLD AUTO: 328 K/UL (ref 150–450)
PMV BLD AUTO: 9 FL (ref 9.4–12.3)
POTASSIUM SERPL-SCNC: 3.7 MMOL/L (ref 3.5–5.1)
PROT SERPL-MCNC: 7.1 G/DL (ref 6.3–8.2)
PROTHROMBIN TIME: 13.1 SEC (ref 11.3–14.9)
RBC # BLD AUTO: 4.99 M/UL (ref 4.05–5.2)
S AUREUS CPE NOSE QL NAA+PROBE: NOT DETECTED
SODIUM SERPL-SCNC: 139 MMOL/L (ref 136–145)
WBC # BLD AUTO: 6.8 K/UL (ref 4.3–11.1)

## 2025-02-04 PROCEDURE — 83036 HEMOGLOBIN GLYCOSYLATED A1C: CPT

## 2025-02-04 PROCEDURE — 94664 DEMO&/EVAL PT USE INHALER: CPT

## 2025-02-04 PROCEDURE — 97161 PT EVAL LOW COMPLEX 20 MIN: CPT

## 2025-02-04 PROCEDURE — 85610 PROTHROMBIN TIME: CPT

## 2025-02-04 PROCEDURE — 80053 COMPREHEN METABOLIC PANEL: CPT

## 2025-02-04 PROCEDURE — 94760 N-INVAS EAR/PLS OXIMETRY 1: CPT

## 2025-02-04 PROCEDURE — 93010 ELECTROCARDIOGRAM REPORT: CPT | Performed by: INTERNAL MEDICINE

## 2025-02-04 PROCEDURE — 87641 MR-STAPH DNA AMP PROBE: CPT

## 2025-02-04 PROCEDURE — 93005 ELECTROCARDIOGRAM TRACING: CPT

## 2025-02-04 PROCEDURE — 98960 EDU&TRN PT SELF-MGMT NQHP 1: CPT

## 2025-02-04 PROCEDURE — 85025 COMPLETE CBC W/AUTO DIFF WBC: CPT

## 2025-02-04 ASSESSMENT — KOOS JR
STRAIGHTENING KNEE FULLY: MODERATE
KOOS JR TOTAL INTERVAL SCORE: 59.381
GOING UP OR DOWN STAIRS: MODERATE
HOW SEVERE IS YOUR KNEE STIFFNESS AFTER FIRST WAKING IN MORNING: MODERATE
RISING FROM SITTING: MILD
TWISING OR PIVOTING ON KNEE: MODERATE
STANDING UPRIGHT: MILD
BENDING TO THE FLOOR TO PICK UP OBJECT: MILD

## 2025-02-04 ASSESSMENT — PAIN DESCRIPTION - ORIENTATION
ORIENTATION: RIGHT;ANTERIOR;INNER;OUTER
ORIENTATION: RIGHT

## 2025-02-04 ASSESSMENT — PAIN SCALES - GENERAL
PAINLEVEL_OUTOF10: 3
PAINLEVEL_OUTOF10: 8

## 2025-02-04 ASSESSMENT — PULMONARY FUNCTION TESTS
FEV1 (%PREDICTED): 75
FEV1 (LITERS): 1.64

## 2025-02-04 ASSESSMENT — PAIN DESCRIPTION - DESCRIPTORS
DESCRIPTORS: SHARP;SHOOTING;STABBING;THROBBING
DESCRIPTORS: ACHING;SHARP;STABBING

## 2025-02-04 ASSESSMENT — PAIN DESCRIPTION - LOCATION
LOCATION: KNEE
LOCATION: KNEE

## 2025-02-04 NOTE — PERIOP NOTE
The GLP-1 agonists are associated with adverse gastrointestinal effects such as nausea, vomiting, and delayed gastric emptying. Delayed gastric emptying from GLP-1 agonists can increase the risk of regurgitation and pulmonary aspiration of gastric contents during general anesthesia and deep sedation.     The recommendation for patients taking Glucagon-Like Peptide-1 (GLP-1) Receptor Agonists is to have nothing after midnight and keep a clear liquid diet the day before your surgery. The accepted clear liquids are included below.     CLEAR LIQUID DIET ON 02/11/25    Things included in a clear liquid diet:     Water  Black Coffee (may have sugar but no milk or cream)  Tea  Any type soft drink  Apple, Cranberry, or Grape juice  Chicken bouillon or broth  Beef bouillon or broth  Popsicles  Jell-O (any flavor), NO solid fruit mixed in  Hard candy that is clear, such as lifesavers or lemon drops    Things NOT included in clear liquid:     Milk and milk products  Milkshakes  Any solid food  Any solid soup with solid ingredients such as noodles  Any creamed soup  Gum or Mints  Orange juice (or any other juice containing pulp)         -Do not eat anything after midnight the night before surgery    -The anesthesia department would like for you to drink 32 ounces of non-caffeinated clear liquids 4 hours prior to arrival to avoid dehydration     -May have: Apple or Cranberry Juice, Gatorade, or Water.    -You can have any combination of these clear liquids as long as it equals 32 ounces    -Do not place anything in your mouth during the 4 hours before you arrive to the hospital. This would include: Gum, Candy, Mints, Ice Chips, ect..

## 2025-02-04 NOTE — PROGRESS NOTES
Glenda Scruggs  : 1965  Primary: [facility]  Secondary:  [facility] at [place] [address]  [phone]      Physical Therapy Prehab Evaluation Summary:2025   Time In/Out   PT Charge Capture  Episode     MEDICAL/REFERRING DIAGNOSIS: Unilateral primary osteoarthritis, right knee [M17.11]  REFERRING PHYSICIAN: David Collado MD    Treatment Diagnosis:   Pain in Right Knee (M25.561)  Stiffness of Right Knee, Not elsewhere classified (M25.661)    DATE OF SURGERY: 25  Assessment:   COMMENTS:  Ms. Scruggs is present for a Prehab Physical Therapy Assessment for their upcoming right TKA. They are here alone. After discussing the surgical admission options and discharge plans, they are planning on discharging on day of surgery.    She plans on returning home on the day of surgery and her spouse will offer assistance. She has a RW, cane and shower chair to use at [facility].     PROBLEM LIST:   (Impacting functional limitations):  Ms. Scruggs presents with the following lower extremity(s) problems:  Strength  Range of Motion  Home Exercise Program  Pain INTERVENTIONS PLANNED:   (Benefits and precautions of physical therapy have been discussed with the patient.)  Home Exercise Program  Educational Discussion       GOALS: (Goals have been discussed and agreed upon with patient.)  Discharge Goals: Time Frame: 1 Day  Patient will demonstrate independence with a home exercise program designed to increase strength, range of motion, and pain control to minimize functional deficits and optimize patient for total joint replacement.    Subjective:   Past Medical History/Comorbidities:   Ms. Scruggs  has a past medical history of Adverse effect of anesthesia, Anxiety, Asthma, BMI 36.0-36.9,adult, Depression, Diabetes (HCC), Dyslipidemia, GERD (gastroesophageal reflux disease), Headache, History of kidney stones, Hypertension, CRICKET on CPAP, and

## 2025-02-04 NOTE — PROGRESS NOTES
02/04/25 0754   Treatment   Treatment Type Bedside spirometry   Breath Sounds   Breath Sounds Bilateral Clear;Diminished   Oxygen Therapy/Pulse Ox   O2 Therapy Room air   Pulse 82   SpO2 96 %   Pulse Oximeter Device Mode Intermittent   $Pulse Oximeter $Spot check (single)   Bedside Spirometry   FEV-1/Actual (Liters) 1.64 Liters   FEV-1/Predicted (Liters) 75 Liters   RT Misc Charges   $RT Education $HHN/MDI/IPPB Demo or Eval  (SPACER)     Initial respiratory Assessment completed with pt. Pt was interviewed and evaluated in Joint camp prior to surgery.  Patient ID:  Glenda Scruggs  [id number]  59 y.o.  1965  Surgeon:  JANET  Date of Surgery: [unfilled]2/12/2025  Procedure: Total Right Knee Arthroplasty  Primary Care Physician: Marianela Bailey [phone]  Specialists:[facility] 12/4/2024    Pt taught proper COUGH technique  IS REVIEWED WITH PT AS WELL AS BENEFITS OF USING IS IN SEDENTARY PTS.  DIAPHRAGMATIC BREATHING EXERCISE INSTRUCTIONS GIVEN    History of smoking:   DENIES                 Quit date:         Secondhand smoke:DENIES    Past procedures with Oxygen desaturation or delayed awakening:DELAYED AWAKENING     Respiratory history:DENIES SOB                                ASTHMA, CRICKET- CPAP                                  Respiratory meds:  PT uses MDI PRN with PROAIR.   MDI instructions given verbally & written along with spacer.  Pt to use home MDI's morning of surgery & bring to Hospital.    FAMILY PRESENT:             NO     PAST SLEEP STUDY:        YES                  7/12/2024 AHI 17 SAT 82%  HX OF CRICKET:                        YES                     CRICKET assessment:     DANGERS OF UNTREATED CRICKET EXPLAINED TO PT.                                          SLEEPS ON SIDE        PHYSICAL EXAM   Body mass index is 36.4 kg/m².   Vitals:    02/04/25 0754   BP:    Pulse: (P) 82   Resp:    Temp:    SpO2: (P) 96%     Neck circumference:   40.5   cm    Loud snoring:

## 2025-02-04 NOTE — PERIOP NOTE
PLEASE CONTINUE TAKING ALL PRESCRIPTION MEDICATIONS UP TO THE DAY OF SURGERY UNLESS OTHERWISE DIRECTED BELOW. You may take Tylenol, allergy, and/or indigestion medications.     TAKE ONLY THESE MEDICATIONS ON THE DAY OF SURGERY ON 02/12/25      Levothyroxine            DISCONTINUE all vitamins, herbals, and supplements 3 weeks prior to surgery. DISCONTINUE Non-Steroidal Anti-Inflammatory (NSAIDS) such as Advil, Ibuprofen, Motrin, Naproxen, and Aleve 5 days prior to surgery unless instructed to hold longer by surgeon.     Home Medications to Hold- please continue all other medications except these.            Comments      On the day before surgery (02/11/25) please take 2 Tylenol in the morning and then again before bed. You may use either regular or extra strength.      Bring: Photo ID, Insurance card, Jennifer-hex soap, C-PAP       Please do not bring home medications with you on the day of surgery unless otherwise directed by your nurse.  If you are instructed to bring home medications, please give them to your nurse as they will be administered by the nursing staff.    If you have any questions, please call [facility] [phone].    A copy of this note was provided to the patient for reference.

## 2025-02-04 NOTE — PERIOP NOTE
CBC, CMP, PT/INR, A1C; results are within anesthesia guidelines, no follow-up required. Labs automatically routed to ordering provider via Epic documentation.

## 2025-02-04 NOTE — PERIOP NOTE
Patient verified name and .    Order for consent was found in EHR and does match case posting; patient verified.     Type 3 surgery, joint-camp assessment complete.    Labs per surgeon: CBC, CMP, A1C, PT/INR ; results pending.  Labs per anesthesia protocol: no additional labs needed.   EKG: Completed today. Confirmed reading within anesthesia protocol.      MRSA/MSSA swab collected per policy. MD to consult pharmacy to dose Vanc if appropriate.     Hospital approved surgical skin cleanser and instructions to return bottle on DOS given per hospital policy.    Patient provided with handouts including Guide to Surgery, Pain Management, Preventing Surgical Site Infections, and [facility] Brochure.    Patient answered medical/surgical history questions at their best of ability. All prior to admission medications documented in Epic. Original medication prescription bottle was visualized during patient appointment.     Patient instructed to hold all vitamins 3 weeks prior to surgery and NSAIDS 5 days prior to surgery.     Patient teach back successful and patient demonstrates knowledge of instruction.

## 2025-02-05 ENCOUNTER — OFFICE VISIT (OUTPATIENT)
Dept: ORTHOPEDIC SURGERY | Age: 60
End: 2025-02-05

## 2025-02-05 VITALS — HEIGHT: 62 IN | WEIGHT: 202.4 LBS | BODY MASS INDEX: 37.25 KG/M2

## 2025-02-05 DIAGNOSIS — M17.11 PRIMARY OSTEOARTHRITIS OF RIGHT KNEE: Primary | ICD-10-CM

## 2025-02-05 PROCEDURE — 99024 POSTOP FOLLOW-UP VISIT: CPT | Performed by: ORTHOPAEDIC SURGERY

## 2025-02-05 RX ORDER — CELECOXIB 200 MG/1
200 CAPSULE ORAL 2 TIMES DAILY
Qty: 60 CAPSULE | Refills: 1 | Status: SHIPPED | OUTPATIENT
Start: 2025-02-05

## 2025-02-05 RX ORDER — ASPIRIN 81 MG/1
81 TABLET ORAL EVERY 12 HOURS
Qty: 70 TABLET | Refills: 0 | Status: SHIPPED | OUTPATIENT
Start: 2025-02-05

## 2025-02-05 RX ORDER — SENNA AND DOCUSATE SODIUM 50; 8.6 MG/1; MG/1
1 TABLET, FILM COATED ORAL DAILY
Qty: 30 TABLET | Refills: 1 | Status: SHIPPED | OUTPATIENT
Start: 2025-02-05

## 2025-02-05 RX ORDER — OXYCODONE HYDROCHLORIDE 5 MG/1
5-10 TABLET ORAL EVERY 4 HOURS PRN
Qty: 30 TABLET | Refills: 0 | Status: SHIPPED | OUTPATIENT
Start: 2025-02-05 | End: 2025-02-12

## 2025-02-05 RX ORDER — ACETAMINOPHEN 325 MG/1
975 TABLET ORAL EVERY 8 HOURS
Qty: 60 TABLET | Refills: 2 | Status: SHIPPED | OUTPATIENT
Start: 2025-02-05

## 2025-02-05 RX ORDER — TRAMADOL HYDROCHLORIDE 50 MG/1
50 TABLET ORAL EVERY 6 HOURS PRN
Qty: 28 TABLET | Refills: 0 | Status: SHIPPED | OUTPATIENT
Start: 2025-02-05 | End: 2025-02-12

## 2025-02-05 NOTE — H&P (VIEW-ONLY)
Patient ID:  Glenda Scruggs  [id number]  59 y.o.  1965    Today: February 5, 2025          Chief Complaint:  Right Knee pain    HPI:       Glenda Scruggs is a 59 y.o. female seen regarding her right knee again.  This is her preop visit.   Past Medical History:  Past Medical History:   Diagnosis Date    Adverse effect of anesthesia     delayed awakening; pt reports also teary post-op    Anxiety     mananged with meds    BMI 36.0-36.9,adult     Depression     Diabetes (HCC)     Type 2, oral med, ozempic, does not check bs at home, denies hypo, A1c-6.9 on 2/4/25    Dyslipidemia     on med for control    GERD (gastroesophageal reflux disease)     Pepcid    Headache     History of kidney stones     Hypertension     managed with meds    OA (osteoarthritis) of knee     CRICKTE on CPAP     instructed to bring c-pap dos    Thyroid disease     hypo       Past Surgical History:  Past Surgical History:   Procedure Laterality Date    BREAST REDUCTION SURGERY  1999    COLONOSCOPY  2017    DILATION AND CURETTAGE OF UTERUS  10/2017    endometrial ablation    FRAGMENT KIDNEY STONE/ ESWL      HEENT      GUM SURGURY    HEENT  2018    sinus    HERNIA REPAIR Bilateral 01/22/2024    HERNIA INGUINAL REPAIR LAPAROSCOPIC ROBOTIC performed by Cherelle [place] [facility] [place]    SHOULDER ARTHROSCOPY Left     Slap tear repair    TRABECULECTOMY Bilateral 2011    eyes; Dr. Anguiano        Medications:     Prior to Admission medications    Medication Sig Start Date End Date Taking? Authorizing Provider   Vitamin D-Vitamin K (VITAMIN K2-VITAMIN D3 PO) Take 1 capsule by mouth at bedtime    Provider, MD Honorio   vilazodone HCl (VIIBRYD) 40 MG TABS Take 1 tablet by mouth daily 1/16/25 7/15/25  Hector Marx MD   metFORMIN (GLUCOPHAGE) 500 MG tablet Take 1 tablet by mouth 2 times daily (with meals) TAKE ONE TABLET BY MOUTH ONE TIME DAILY AT NIGHT 11/18/24   Marianela Bailey PA   famotidine (PEPCID) 20 MG  thrombosis/pulmonary embolism, infection, instability, altered sensation, persistent pain, loosening/wear/failure, and stifness.  The benefits are pain relief and improved mobility.  The alternatives would include continued non-operative treatment.    Prior to surgery we will arrange for:   1) medical optimization:  +  2) total joint class:  +  3) nicotine testing:  no  4) cardiac optimization:  no      They are at low risk for DVT and we will plan to use ECASA , SCDs, and early mobilization for DVT prophylaxis.  They will obtain a walker from the total joint class for use to prevent falling with ambulation after surgery.  They will need prehab physical therapy at [facility] and outpatient physical therapy.        Signed By: David Collado MD  February 5, 2025

## 2025-02-05 NOTE — PROGRESS NOTES
Patient ID:  Glenda Scruggs  [id number]  59 y.o.  1965    Today: February 5, 2025          Chief Complaint:  Right Knee pain    HPI:       Glenda Scruggs is a 59 y.o. female seen regarding her right knee again.  This is her preop visit.   Past Medical History:  Past Medical History:   Diagnosis Date    Adverse effect of anesthesia     delayed awakening; pt reports also teary post-op    Anxiety     mananged with meds    BMI 36.0-36.9,adult     Depression     Diabetes (HCC)     Type 2, oral med, ozempic, does not check bs at home, denies hypo, A1c-6.9 on 2/4/25    Dyslipidemia     on med for control    GERD (gastroesophageal reflux disease)     Pepcid    Headache     History of kidney stones     Hypertension     managed with meds    OA (osteoarthritis) of knee     CRICKET on CPAP     instructed to bring c-pap dos    Thyroid disease     hypo       Past Surgical History:  Past Surgical History:   Procedure Laterality Date    BREAST REDUCTION SURGERY  1999    COLONOSCOPY  2017    DILATION AND CURETTAGE OF UTERUS  10/2017    endometrial ablation    FRAGMENT KIDNEY STONE/ ESWL      HEENT      GUM SURGURY    HEENT  2018    sinus    HERNIA REPAIR Bilateral 01/22/2024    HERNIA INGUINAL REPAIR LAPAROSCOPIC ROBOTIC performed by Cherelle Kennedy MD at [facility] MAIN OR    SHOULDER ARTHROSCOPY Left     Slap tear repair    TRABECULECTOMY Bilateral 2011    eyes; Dr. Anguiano        Medications:     Prior to Admission medications    Medication Sig Start Date End Date Taking? Authorizing Provider   Vitamin D-Vitamin K (VITAMIN K2-VITAMIN D3 PO) Take 1 capsule by mouth at bedtime    Provider, MD Honorio   vilazodone HCl (VIIBRYD) 40 MG TABS Take 1 tablet by mouth daily 1/16/25 7/15/25  Hector Marx MD   metFORMIN (GLUCOPHAGE) 500 MG tablet Take 1 tablet by mouth 2 times daily (with meals) TAKE ONE TABLET BY MOUTH ONE TIME DAILY AT NIGHT 11/18/24   Marianela Bailey PA   famotidine (PEPCID) 20 MG

## 2025-02-06 ASSESSMENT — PROMIS GLOBAL HEALTH SCALE
IN GENERAL, PLEASE RATE HOW WELL YOU CARRY OUT YOUR USUAL SOCIAL ACTIVITIES (INCLUDES ACTIVITIES AT HOME, AT WORK, AND IN YOUR COMMUNITY, AND RESPONSIBILITIES AS A PARENT, CHILD, SPOUSE, EMPLOYEE, FRIEND, ETC) [ON A SCALE OF 1 (POOR) TO 5 (EXCELLENT)]?: VERY GOOD
IN THE PAST 7 DAYS, HOW OFTEN HAVE YOU BEEN BOTHERED BY EMOTIONAL PROBLEMS, SUCH AS FEELING ANXIOUS, DEPRESSED, OR IRRITABLE [ON A SCALE FROM 1 (NEVER) TO 5 (ALWAYS)]?: SOMETIMES
SUM OF RESPONSES TO QUESTIONS 3, 6, 7, & 8: 17
IN GENERAL, WOULD YOU SAY YOUR QUALITY OF LIFE IS...[ON A SCALE OF 1 (POOR) TO 5 (EXCELLENT)]: VERY GOOD
TO WHAT EXTENT ARE YOU ABLE TO CARRY OUT YOUR EVERYDAY PHYSICAL ACTIVITIES SUCH AS WALKING, CLIMBING STAIRS, CARRYING GROCERIES, OR MOVING A CHAIR [ON A SCALE OF 1 (NOT AT ALL) TO 5 (COMPLETELY)]?: MOSTLY
IN GENERAL, WOULD YOU SAY YOUR HEALTH IS...[ON A SCALE OF 1 (POOR) TO 5 (EXCELLENT)]: VERY GOOD
IN GENERAL, HOW WOULD YOU RATE YOUR SATISFACTION WITH YOUR SOCIAL ACTIVITIES AND RELATIONSHIPS [ON A SCALE OF 1 (POOR) TO 5 (EXCELLENT)]?: VERY GOOD
IN GENERAL, HOW WOULD YOU RATE YOUR MENTAL HEALTH, INCLUDING YOUR MOOD AND YOUR ABILITY TO THINK [ON A SCALE OF 1 (POOR) TO 5 (EXCELLENT)]?: VERY GOOD
WHO IS THE PERSON COMPLETING THE PROMIS V1.1 SURVEY?: SELF
IN THE PAST 7 DAYS, HOW WOULD YOU RATE YOUR PAIN ON AVERAGE [ON A SCALE FROM 0 (NO PAIN) TO 10 (WORST IMAGINABLE PAIN)]?: 6
IN THE PAST 7 DAYS, HOW WOULD YOU RATE YOUR FATIGUE ON AVERAGE [ON A SCALE FROM 1 (NONE) TO 5 (VERY SEVERE)]?: MILD
SUM OF RESPONSES TO QUESTIONS 2, 4, 5, & 10: 15
HOW IS THE PROMIS V1.1 BEING ADMINISTERED?: PAPER
IN GENERAL, HOW WOULD YOU RATE YOUR PHYSICAL HEALTH [ON A SCALE OF 1 (POOR) TO 5 (EXCELLENT)]?: GOOD

## 2025-02-07 ENCOUNTER — LAB (OUTPATIENT)
Dept: FAMILY MEDICINE CLINIC | Facility: CLINIC | Age: 60
End: 2025-02-07

## 2025-02-07 DIAGNOSIS — E11.65 CONTROLLED TYPE 2 DIABETES MELLITUS WITH HYPERGLYCEMIA, WITHOUT LONG-TERM CURRENT USE OF INSULIN (HCC): ICD-10-CM

## 2025-02-07 LAB
ANION GAP SERPL CALC-SCNC: 11 MMOL/L (ref 7–16)
BUN SERPL-MCNC: 17 MG/DL (ref 6–23)
CALCIUM SERPL-MCNC: 9.6 MG/DL (ref 8.8–10.2)
CHLORIDE SERPL-SCNC: 101 MMOL/L (ref 98–107)
CO2 SERPL-SCNC: 28 MMOL/L (ref 20–29)
CREAT SERPL-MCNC: 0.84 MG/DL (ref 0.6–1.1)
EST. AVERAGE GLUCOSE BLD GHB EST-MCNC: 158 MG/DL
GLUCOSE SERPL-MCNC: 90 MG/DL (ref 70–99)
HBA1C MFR BLD: 7.1 % (ref 0–5.6)
POTASSIUM SERPL-SCNC: 4.7 MMOL/L (ref 3.5–5.1)
SODIUM SERPL-SCNC: 140 MMOL/L (ref 136–145)

## 2025-02-12 ENCOUNTER — ANESTHESIA (OUTPATIENT)
Dept: SURGERY | Age: 60
End: 2025-02-12
Payer: COMMERCIAL

## 2025-02-12 ENCOUNTER — HOSPITAL ENCOUNTER (OUTPATIENT)
Age: 60
Discharge: HOME HEALTH | End: 2025-02-12
Attending: ORTHOPAEDIC SURGERY | Admitting: ORTHOPAEDIC SURGERY
Payer: COMMERCIAL

## 2025-02-12 ENCOUNTER — HOSPITAL ENCOUNTER (OUTPATIENT)
Dept: GENERAL RADIOLOGY | Age: 60
Discharge: HOME | End: 2025-02-15
Attending: ORTHOPAEDIC SURGERY
Payer: COMMERCIAL

## 2025-02-12 ENCOUNTER — ANESTHESIA EVENT (OUTPATIENT)
Dept: SURGERY | Age: 60
End: 2025-02-12
Payer: COMMERCIAL

## 2025-02-12 VITALS
HEIGHT: 62 IN | TEMPERATURE: 97.9 F | WEIGHT: 201.2 LBS | DIASTOLIC BLOOD PRESSURE: 77 MMHG | RESPIRATION RATE: 18 BRPM | HEART RATE: 89 BPM | SYSTOLIC BLOOD PRESSURE: 110 MMHG | BODY MASS INDEX: 37.02 KG/M2 | OXYGEN SATURATION: 95 %

## 2025-02-12 PROBLEM — M17.11 ARTHRITIS OF RIGHT KNEE: Status: ACTIVE | Noted: 2025-02-12

## 2025-02-12 LAB
GLUCOSE BLD STRIP.AUTO-MCNC: 122 MG/DL (ref 65–100)
GLUCOSE BLD STRIP.AUTO-MCNC: 138 MG/DL (ref 65–100)
SERVICE CMNT-IMP: ABNORMAL
SERVICE CMNT-IMP: ABNORMAL

## 2025-02-12 PROCEDURE — 2709999900 HC NON-CHARGEABLE SUPPLY: Performed by: ORTHOPAEDIC SURGERY

## 2025-02-12 PROCEDURE — 3600000015 HC SURGERY LEVEL 5 ADDTL 15MIN: Performed by: ORTHOPAEDIC SURGERY

## 2025-02-12 PROCEDURE — 6360000002 HC RX W HCPCS: Performed by: ORTHOPAEDIC SURGERY

## 2025-02-12 PROCEDURE — 6360000002 HC RX W HCPCS: Performed by: NURSE ANESTHETIST, CERTIFIED REGISTERED

## 2025-02-12 PROCEDURE — 82962 GLUCOSE BLOOD TEST: CPT

## 2025-02-12 PROCEDURE — 2580000003 HC RX 258: Performed by: ANESTHESIOLOGY

## 2025-02-12 PROCEDURE — 73560 X-RAY EXAM OF KNEE 1 OR 2: CPT

## 2025-02-12 PROCEDURE — 3700000000 HC ANESTHESIA ATTENDED CARE: Performed by: ORTHOPAEDIC SURGERY

## 2025-02-12 PROCEDURE — 6370000000 HC RX 637 (ALT 250 FOR IP): Performed by: ORTHOPAEDIC SURGERY

## 2025-02-12 PROCEDURE — 7100000000 HC PACU RECOVERY - FIRST 15 MIN: Performed by: ORTHOPAEDIC SURGERY

## 2025-02-12 PROCEDURE — C1776 JOINT DEVICE (IMPLANTABLE): HCPCS | Performed by: ORTHOPAEDIC SURGERY

## 2025-02-12 PROCEDURE — 6360000002 HC RX W HCPCS: Performed by: ANESTHESIOLOGY

## 2025-02-12 PROCEDURE — 64447 NJX AA&/STRD FEMORAL NRV IMG: CPT | Performed by: ANESTHESIOLOGY

## 2025-02-12 PROCEDURE — 2500000003 HC RX 250 WO HCPCS: Performed by: ORTHOPAEDIC SURGERY

## 2025-02-12 PROCEDURE — 97165 OT EVAL LOW COMPLEX 30 MIN: CPT

## 2025-02-12 PROCEDURE — 7100000001 HC PACU RECOVERY - ADDTL 15 MIN: Performed by: ORTHOPAEDIC SURGERY

## 2025-02-12 PROCEDURE — 97535 SELF CARE MNGMENT TRAINING: CPT

## 2025-02-12 PROCEDURE — 3600000005 HC SURGERY LEVEL 5 BASE: Performed by: ORTHOPAEDIC SURGERY

## 2025-02-12 PROCEDURE — 97161 PT EVAL LOW COMPLEX 20 MIN: CPT

## 2025-02-12 PROCEDURE — 97530 THERAPEUTIC ACTIVITIES: CPT

## 2025-02-12 PROCEDURE — 2500000003 HC RX 250 WO HCPCS: Performed by: NURSE ANESTHETIST, CERTIFIED REGISTERED

## 2025-02-12 PROCEDURE — C1713 ANCHOR/SCREW BN/BN,TIS/BN: HCPCS | Performed by: ORTHOPAEDIC SURGERY

## 2025-02-12 PROCEDURE — 3700000001 HC ADD 15 MINUTES (ANESTHESIA): Performed by: ORTHOPAEDIC SURGERY

## 2025-02-12 PROCEDURE — 27447 TOTAL KNEE ARTHROPLASTY: CPT | Performed by: ORTHOPAEDIC SURGERY

## 2025-02-12 DEVICE — KNEE K2 TOT HEMI ADV CMTLS IMPL CAPPED SYNTHES: Type: IMPLANTABLE DEVICE | Status: FUNCTIONAL

## 2025-02-12 DEVICE — ATTUNE KNEE SYSTEM FEMORAL POROCOAT CRUCIATE RETAINING NARROW SIZE 6N RIGHT CEMENTLESS
Type: IMPLANTABLE DEVICE | Site: KNEE | Status: FUNCTIONAL
Brand: ATTUNE

## 2025-02-12 DEVICE — ATTUNE KNEE SYSTEM TIBIAL BASE AFFIXIUM FIXED BEARING SIZE 5
Type: IMPLANTABLE DEVICE | Site: KNEE | Status: FUNCTIONAL
Brand: ATTUNE AFFIXIUM

## 2025-02-12 DEVICE — DEPUY CMW 2 FAST SET BONE CEMENT 20G: Type: IMPLANTABLE DEVICE | Site: PATELLA | Status: FUNCTIONAL

## 2025-02-12 DEVICE — ATTUNE KNEE SYSTEM TIBIAL INSERT FIXED BEARING MEDIAL STABILIZED RIGHT AOX 6, 6MM
Type: IMPLANTABLE DEVICE | Site: KNEE | Status: FUNCTIONAL
Brand: ATTUNE

## 2025-02-12 DEVICE — ATTUNE PATELLA MEDIALIZED DOME 35MM CEMENTED AOX
Type: IMPLANTABLE DEVICE | Site: PATELLA | Status: FUNCTIONAL
Brand: ATTUNE

## 2025-02-12 RX ORDER — SODIUM CHLORIDE, SODIUM LACTATE, POTASSIUM CHLORIDE, CALCIUM CHLORIDE 600; 310; 30; 20 MG/100ML; MG/100ML; MG/100ML; MG/100ML
INJECTION, SOLUTION INTRAVENOUS CONTINUOUS
Status: DISCONTINUED | OUTPATIENT
Start: 2025-02-12 | End: 2025-02-12 | Stop reason: HOSPADM

## 2025-02-12 RX ORDER — NALOXONE HYDROCHLORIDE 0.4 MG/ML
INJECTION, SOLUTION INTRAMUSCULAR; INTRAVENOUS; SUBCUTANEOUS PRN
Status: DISCONTINUED | OUTPATIENT
Start: 2025-02-12 | End: 2025-02-12 | Stop reason: HOSPADM

## 2025-02-12 RX ORDER — MIDAZOLAM HYDROCHLORIDE 2 MG/2ML
2 INJECTION, SOLUTION INTRAMUSCULAR; INTRAVENOUS
Status: COMPLETED | OUTPATIENT
Start: 2025-02-12 | End: 2025-02-12

## 2025-02-12 RX ORDER — SODIUM CHLORIDE 9 MG/ML
INJECTION, SOLUTION INTRAVENOUS CONTINUOUS
Status: DISCONTINUED | OUTPATIENT
Start: 2025-02-12 | End: 2025-02-12 | Stop reason: HOSPADM

## 2025-02-12 RX ORDER — OXYCODONE HYDROCHLORIDE 5 MG/1
5 TABLET ORAL
Status: DISCONTINUED | OUTPATIENT
Start: 2025-02-12 | End: 2025-02-12 | Stop reason: HOSPADM

## 2025-02-12 RX ORDER — SODIUM CHLORIDE 0.9 % (FLUSH) 0.9 %
5-40 SYRINGE (ML) INJECTION EVERY 12 HOURS SCHEDULED
Status: DISCONTINUED | OUTPATIENT
Start: 2025-02-12 | End: 2025-02-12 | Stop reason: HOSPADM

## 2025-02-12 RX ORDER — FENTANYL CITRATE 50 UG/ML
100 INJECTION, SOLUTION INTRAMUSCULAR; INTRAVENOUS
Status: COMPLETED | OUTPATIENT
Start: 2025-02-12 | End: 2025-02-12

## 2025-02-12 RX ORDER — KETAMINE HCL IN NACL, ISO-OSM 20 MG/2 ML
SYRINGE (ML) INJECTION
Status: DISCONTINUED | OUTPATIENT
Start: 2025-02-12 | End: 2025-02-12 | Stop reason: SDUPTHER

## 2025-02-12 RX ORDER — SODIUM CHLORIDE 0.9 % (FLUSH) 0.9 %
5-40 SYRINGE (ML) INJECTION PRN
Status: DISCONTINUED | OUTPATIENT
Start: 2025-02-12 | End: 2025-02-12 | Stop reason: HOSPADM

## 2025-02-12 RX ORDER — IPRATROPIUM BROMIDE AND ALBUTEROL SULFATE 2.5; .5 MG/3ML; MG/3ML
1 SOLUTION RESPIRATORY (INHALATION)
Status: DISCONTINUED | OUTPATIENT
Start: 2025-02-12 | End: 2025-02-12 | Stop reason: HOSPADM

## 2025-02-12 RX ORDER — PROPOFOL 10 MG/ML
INJECTION, EMULSION INTRAVENOUS
Status: DISCONTINUED | OUTPATIENT
Start: 2025-02-12 | End: 2025-02-12 | Stop reason: SDUPTHER

## 2025-02-12 RX ORDER — ACETAMINOPHEN 500 MG
1000 TABLET ORAL EVERY 8 HOURS SCHEDULED
Status: DISCONTINUED | OUTPATIENT
Start: 2025-02-12 | End: 2025-02-12 | Stop reason: HOSPADM

## 2025-02-12 RX ORDER — KETOROLAC TROMETHAMINE 30 MG/ML
30 INJECTION, SOLUTION INTRAMUSCULAR; INTRAVENOUS EVERY 6 HOURS
Status: DISCONTINUED | OUTPATIENT
Start: 2025-02-12 | End: 2025-02-12 | Stop reason: HOSPADM

## 2025-02-12 RX ORDER — PROCHLORPERAZINE EDISYLATE 5 MG/ML
5 INJECTION INTRAMUSCULAR; INTRAVENOUS
Status: DISCONTINUED | OUTPATIENT
Start: 2025-02-12 | End: 2025-02-12 | Stop reason: HOSPADM

## 2025-02-12 RX ORDER — SENNA AND DOCUSATE SODIUM 50; 8.6 MG/1; MG/1
1 TABLET, FILM COATED ORAL 2 TIMES DAILY
Status: DISCONTINUED | OUTPATIENT
Start: 2025-02-12 | End: 2025-02-12 | Stop reason: HOSPADM

## 2025-02-12 RX ORDER — EPHEDRINE SULFATE/0.9% NACL/PF 50 MG/5 ML
SYRINGE (ML) INTRAVENOUS
Status: DISCONTINUED | OUTPATIENT
Start: 2025-02-12 | End: 2025-02-12 | Stop reason: SDUPTHER

## 2025-02-12 RX ORDER — SODIUM CHLORIDE 9 MG/ML
INJECTION, SOLUTION INTRAVENOUS PRN
Status: DISCONTINUED | OUTPATIENT
Start: 2025-02-12 | End: 2025-02-12 | Stop reason: HOSPADM

## 2025-02-12 RX ORDER — KETOROLAC TROMETHAMINE 30 MG/ML
INJECTION, SOLUTION INTRAMUSCULAR; INTRAVENOUS PRN
Status: DISCONTINUED | OUTPATIENT
Start: 2025-02-12 | End: 2025-02-12 | Stop reason: HOSPADM

## 2025-02-12 RX ORDER — ONDANSETRON 4 MG/1
4 TABLET, ORALLY DISINTEGRATING ORAL EVERY 8 HOURS PRN
Status: DISCONTINUED | OUTPATIENT
Start: 2025-02-12 | End: 2025-02-12 | Stop reason: HOSPADM

## 2025-02-12 RX ORDER — OXYCODONE HYDROCHLORIDE 5 MG/1
10 TABLET ORAL
Status: DISCONTINUED | OUTPATIENT
Start: 2025-02-12 | End: 2025-02-12 | Stop reason: HOSPADM

## 2025-02-12 RX ORDER — ACETAMINOPHEN 500 MG
1000 TABLET ORAL ONCE
Status: DISCONTINUED | OUTPATIENT
Start: 2025-02-12 | End: 2025-02-12 | Stop reason: SDUPTHER

## 2025-02-12 RX ORDER — DEXAMETHASONE SODIUM PHOSPHATE 10 MG/ML
INJECTION, SOLUTION INTRAMUSCULAR; INTRAVENOUS
Status: COMPLETED | OUTPATIENT
Start: 2025-02-12 | End: 2025-02-12

## 2025-02-12 RX ORDER — ASPIRIN 81 MG/1
81 TABLET ORAL 2 TIMES DAILY
Status: DISCONTINUED | OUTPATIENT
Start: 2025-02-12 | End: 2025-02-12 | Stop reason: HOSPADM

## 2025-02-12 RX ORDER — DIPHENHYDRAMINE HCL 25 MG
25 CAPSULE ORAL EVERY 6 HOURS PRN
Status: DISCONTINUED | OUTPATIENT
Start: 2025-02-12 | End: 2025-02-12 | Stop reason: HOSPADM

## 2025-02-12 RX ORDER — ACETAMINOPHEN 500 MG
1000 TABLET ORAL ONCE
Status: COMPLETED | OUTPATIENT
Start: 2025-02-12 | End: 2025-02-12

## 2025-02-12 RX ORDER — DIPHENHYDRAMINE HYDROCHLORIDE 50 MG/ML
25 INJECTION INTRAMUSCULAR; INTRAVENOUS EVERY 6 HOURS PRN
Status: DISCONTINUED | OUTPATIENT
Start: 2025-02-12 | End: 2025-02-12 | Stop reason: HOSPADM

## 2025-02-12 RX ORDER — LIDOCAINE HYDROCHLORIDE 10 MG/ML
1 INJECTION, SOLUTION INFILTRATION; PERINEURAL
Status: DISCONTINUED | OUTPATIENT
Start: 2025-02-12 | End: 2025-02-12 | Stop reason: HOSPADM

## 2025-02-12 RX ORDER — ONDANSETRON 2 MG/ML
4 INJECTION INTRAMUSCULAR; INTRAVENOUS EVERY 6 HOURS PRN
Status: DISCONTINUED | OUTPATIENT
Start: 2025-02-12 | End: 2025-02-12 | Stop reason: HOSPADM

## 2025-02-12 RX ORDER — HALOPERIDOL 5 MG/ML
1 INJECTION INTRAMUSCULAR
Status: DISCONTINUED | OUTPATIENT
Start: 2025-02-12 | End: 2025-02-12 | Stop reason: HOSPADM

## 2025-02-12 RX ORDER — ROPIVACAINE HYDROCHLORIDE 2 MG/ML
INJECTION, SOLUTION EPIDURAL; INFILTRATION; PERINEURAL PRN
Status: DISCONTINUED | OUTPATIENT
Start: 2025-02-12 | End: 2025-02-12 | Stop reason: HOSPADM

## 2025-02-12 RX ADMIN — MIDAZOLAM 2 MG: 1 INJECTION INTRAMUSCULAR; INTRAVENOUS at 08:01

## 2025-02-12 RX ADMIN — SODIUM CHLORIDE, SODIUM LACTATE, POTASSIUM CHLORIDE, AND CALCIUM CHLORIDE: 600; 310; 30; 20 INJECTION, SOLUTION INTRAVENOUS at 08:07

## 2025-02-12 RX ADMIN — MEPIVACAINE HYDROCHLORIDE 60 MG: 20 INJECTION, SOLUTION EPIDURAL; INFILTRATION at 08:19

## 2025-02-12 RX ADMIN — ACETAMINOPHEN 1000 MG: 500 TABLET, FILM COATED ORAL at 14:42

## 2025-02-12 RX ADMIN — CEFAZOLIN 2000 MG: 2 INJECTION, POWDER, FOR SOLUTION INTRAMUSCULAR; INTRAVENOUS at 08:32

## 2025-02-12 RX ADMIN — SODIUM CHLORIDE, SODIUM LACTATE, POTASSIUM CHLORIDE, AND CALCIUM CHLORIDE: 600; 310; 30; 20 INJECTION, SOLUTION INTRAVENOUS at 09:23

## 2025-02-12 RX ADMIN — PROPOFOL 120 MCG/KG/MIN: 10 INJECTION, EMULSION INTRAVENOUS at 08:29

## 2025-02-12 RX ADMIN — KETOROLAC TROMETHAMINE 30 MG: 30 INJECTION, SOLUTION INTRAMUSCULAR at 12:18

## 2025-02-12 RX ADMIN — Medication 10 MG: at 09:01

## 2025-02-12 RX ADMIN — SODIUM CHLORIDE, SODIUM LACTATE, POTASSIUM CHLORIDE, AND CALCIUM CHLORIDE: 600; 310; 30; 20 INJECTION, SOLUTION INTRAVENOUS at 06:41

## 2025-02-12 RX ADMIN — PROPOFOL 50 MG: 10 INJECTION, EMULSION INTRAVENOUS at 08:28

## 2025-02-12 RX ADMIN — ACETAMINOPHEN 1000 MG: 500 TABLET, FILM COATED ORAL at 06:40

## 2025-02-12 RX ADMIN — DEXAMETHASONE SODIUM PHOSPHATE 4 MG: 10 INJECTION, SOLUTION INTRAMUSCULAR; INTRAVENOUS at 08:01

## 2025-02-12 RX ADMIN — OXYCODONE 10 MG: 5 TABLET ORAL at 12:17

## 2025-02-12 RX ADMIN — PHENYLEPHRINE HYDROCHLORIDE 100 MCG: 0.1 INJECTION, SOLUTION INTRAVENOUS at 08:42

## 2025-02-12 RX ADMIN — ROPIVACAINE HYDROCHLORIDE 20 ML: 2 INJECTION, SOLUTION EPIDURAL; INFILTRATION at 08:01

## 2025-02-12 RX ADMIN — PHENYLEPHRINE HYDROCHLORIDE 100 MCG: 0.1 INJECTION, SOLUTION INTRAVENOUS at 09:06

## 2025-02-12 RX ADMIN — Medication 20 MG: at 08:36

## 2025-02-12 RX ADMIN — FENTANYL CITRATE 50 MCG: 50 INJECTION, SOLUTION INTRAMUSCULAR; INTRAVENOUS at 08:01

## 2025-02-12 ASSESSMENT — PAIN DESCRIPTION - DESCRIPTORS: DESCRIPTORS: ACHING

## 2025-02-12 ASSESSMENT — PAIN DESCRIPTION - ORIENTATION: ORIENTATION: RIGHT

## 2025-02-12 ASSESSMENT — PAIN SCALES - GENERAL
PAINLEVEL_OUTOF10: 2
PAINLEVEL_OUTOF10: 7

## 2025-02-12 ASSESSMENT — PAIN - FUNCTIONAL ASSESSMENT: PAIN_FUNCTIONAL_ASSESSMENT: 0-10

## 2025-02-12 ASSESSMENT — PAIN SCALES - WONG BAKER: WONGBAKER_NUMERICALRESPONSE: HURTS A LITTLE BIT

## 2025-02-12 ASSESSMENT — PAIN DESCRIPTION - LOCATION: LOCATION: KNEE

## 2025-02-12 NOTE — DISCHARGE INSTRUCTIONS
[facility]      Patient Discharge Instructions    Glenda Scruggs/[id number] : 1965    Admitted 2025 Discharged: 2025       IF YOU HAVE ANY PROBLEMS ONCE YOU ARE AT HOME CALL THE FOLLOWING NUMBERS:   Main office number: [phone]      Medications    The medications you are to continue on are listed on the medication reconciliation sheet.   Narcotic pain medications as well as supplemental iron can cause constipation. If this occurs try stopping the narcotic pain medication and/or the iron.   It is important that you take the medication exactly as they are prescribed.  Medications which increase your risk of blood clots are listed to stop for 5 weeks after surgery as well as medications or supplements which increase your risk of bleeding complications.   Keep your medication in the bottles provided by the pharmacist and keep a list of the medication names, dosages, and times to be taken in your wallet.   Do not take other medications without consulting your doctor.       Important Information    Do NOT smoke as this will greatly increase your risk of infection!    Resume your prehospital diet. If you have excessive nausea or vomitting call your doctor's office     Leg swelling and warmth is normal for 6 months after surgery. If you experience swelling in your leg elevate you leg while laying down with your toes above your heart. If you have sudden onset severe swelling with leg pain call our office. Use Donovan Hose stockings until we see you in the office for your follow up appointment.    The stitches deep inside take approximately 6 months to dissolve. There will be sharp shooting, stinging and burning pain. This is normal and will resolve between 3-6 months after surgery.     Difficulty sleeping is normal following total Knee and Hip replacement. You may try melatonin, an over-the-counter sleep aid or benadryl to help with sleep. Most patients will resume sleeping through the  have pain that does not get better after you take pain medicine.   Watch closely for changes in your health, and be sure to contact your doctor if:    You do not have a bowel movement after taking a laxative.   Where can you learn more?  Go to https://www.[email or web address].net/patientEd and enter T054 to learn more about \"Total Knee Replacement: What to Expect at Home.\"  Current as of: July 31, 2024  Content Version: 14.3  © 2024 [employer].   Care instructions adapted under license by [employer]. If you have questions about a medical condition or this instruction, always ask your healthcare professional. [employer], disclaims any warranty or liability for your use of this information.      Information obtained by :  I understand that if any problems occur once I am at home I am to contact my physician.    I understand and acknowledge receipt of the instructions indicated above.                                                                                                                                           Physician's or R.N.'s Signature                                                                  Date/Time                                                                                                                                              Patient or Representative Signature                                                          Date/Time

## 2025-02-12 NOTE — PROGRESS NOTES
OCCUPATIONAL THERAPY Initial Assessment, Daily Note, and PM      (Link to Caseload Tracking: OT Visit Days: 1  OT Orders   Time  OT Charge Capture  Rehab Caseload Tracker  Episode     Glenda Scruggs is a 59 y.o. female   PRIMARY DIAGNOSIS: Primary osteoarthritis of right knee  Primary osteoarthritis of right knee [M17.11]  Arthritis of right knee [M17.11]  Procedure(s) (LRB):  RIGHT KNEE TOTAL ARTHROPLASTY-SDD (Right)  Day of Surgery  Reason for Referral: Pain in Right Knee (M25.561)  Stiffness of Right Knee, Not elsewhere classified (M25.661)  Outpatient in a bed: Payor: [employer] / Plan: [employer] - CHOICE PLUS / Product Type: *No Product type* /     ASSESSMENT:     REHAB RECOMMENDATIONS:   Recommendation to date pending progress:  Setting:  No further skilled occupational therapy after discharge from hospital    Equipment:    None     ASSESSMENT:  Ms. Scruggs is s/p right TKA and presents with decreased independence with functional mobility and activities of daily living as compared to baseline level of function and safety. Patient would benefit from skilled Occupational Therapy to maximize independence and safety with self-care task and functional mobility.   Patient able to complete  lower body dressing, upper body dressing, toileting, and grooming at bathroom with minimal assist .  Mobilized from hospital bed to bathroom using a rolling walker with contact guard assist. OT educated pt on bathing safety/hygiene, use of cryocuff, resting joint position, fall prevention strategies, compensatory strategies for LB ADLs, pt verbalized understanding. Patient is hopeful to return home day of surgery. Pt left seated in recliner with needs met, call light in reach, family at bedside.     [facility] AM-PAC™ “6 Clicks” Daily Activity Inpatient Short Form:     AM-PAC Daily Activity - Inpatient   How much help is needed for putting on and taking off regular lower body clothing?: A Little  How

## 2025-02-12 NOTE — ANESTHESIA PROCEDURE NOTES
Spinal Block    Patient location during procedure: OR  End time: 2/12/2025 8:24 AM  Reason for block: primary anesthetic  Staffing  Performed: anesthesiologist   Anesthesiologist: Mata Mendez MD  Performed by: Mata Mendez MD  Authorized by: Mata Mendez MD    Spinal Block  Patient position: sitting  Prep: ChloraPrep  Patient monitoring: cardiac monitor, continuous pulse ox and frequent blood pressure checks  Approach: midline  Location: L3/L4  Provider prep: sterile gloves and mask  Local infiltration: lidocaine  Needle  Needle type: pencil-tip   Needle gauge: 25 G  Needle length: 3.5 in  Assessment  Events: cerebrospinal fluid  Swirl obtained: Yes  CSF: clear  Attempts: 1  Hemodynamics: stable  Preanesthetic Checklist  Completed: patient identified, IV checked, risks and benefits discussed, surgical/procedural consents, equipment checked, pre-op evaluation, timeout performed, anesthesia consent given, oxygen available and monitors applied/VS acknowledged

## 2025-02-12 NOTE — PROGRESS NOTES
ACUTE PHYSICAL THERAPY GOALS:   (Developed with and agreed upon by patient and/or caregiver.)  GOALS (1-4 days):  (1.)Ms. Scruggs will move from supine to sit and sit to supine  in bed with STAND BY ASSIST.  (2.)Ms. Scruggs will transfer from bed to chair and chair to bed with STAND BY ASSIST using the least restrictive device.  (3.)Ms. Scruggs will ambulate with STAND BY ASSIST for 200 feet with the least restrictive device.  (4.)Ms. Scruggs will ambulate up/down 3 steps with right railing with CONTACT GUARD ASSIST.  (5.)Ms. Scruggs will increase right knee ROM to 5-85°.  ________________________________________________________________________________________________           PHYSICAL THERAPY: TOTAL KNEE ARTHROPLASTY Initial Assessment and PM  (Link to Caseload Tracking: PT Visit Days : 1  Acknowledge Orders  Time In/Out  PT Charge Capture  Rehab Caseload Tracker  Episode   Glenda Scruggs is a 59 y.o. female   PRIMARY DIAGNOSIS: Primary osteoarthritis of right knee  Primary osteoarthritis of right knee [M17.11]  Arthritis of right knee [M17.11]  Procedure(s) (LRB):  RIGHT KNEE TOTAL ARTHROPLASTY-SDD (Right)  Day of Surgery  Reason for Referral: Pain in Right Knee (M25.561)  Stiffness of Right Knee, Not elsewhere classified (M25.661)  Difficulty in walking, Not elsewhere classified (R26.2)  Outpatient in a bed: Payor: [employer] / Plan: [employer] - CHOICE PLUS / Product Type: *No Product type* /     REHAB RECOMMENDATIONS:   Recommendation to date pending progress:  Setting:  Home Health Therapy    Equipment:    To Be Determined     RANGE OF MOTION:   Will assess at next session     GAIT: I Mod I S SBA CGA Min Mod Max Total  NT x2 Comments:   Level of Assistance [] [] [] [x] [] [] [] [] [] [] []            Weightbearing Status  Right Lower Extremity Weight Bearing: Weight Bearing As Tolerated    Distance  300 feet    Gait Quality Decreased meli  and Decreased step clearance    DME Rolling

## 2025-02-12 NOTE — PROGRESS NOTES
Spiritual care and pre-op prayer given to patient.    Elham Beavers M.Div, [facility] / / Bereavement Coordinator  Spiritual Care Department   c: [phone]/ [phone] / [email or web address]     [address] [address]  [email or web address]

## 2025-02-12 NOTE — ANESTHESIA PROCEDURE NOTES
Peripheral Block    Patient location during procedure: pre-op  Reason for block: post-op pain management and at surgeon's request  Start time: 2/12/2025 8:01 AM  End time: 2/12/2025 8:03 AM  Staffing  Performed: anesthesiologist   Anesthesiologist: Mata Mendez MD  Performed by: Mata Mendez MD  Authorized by: Mata Mendez MD    Preanesthetic Checklist  Completed: patient identified, IV checked, site marked, risks and benefits discussed, surgical/procedural consents, equipment checked, pre-op evaluation, timeout performed, anesthesia consent given, oxygen available and monitors applied/VS acknowledged  Peripheral Block   Patient position: supine  Prep: ChloraPrep  Provider prep: mask  Patient monitoring: cardiac monitor, continuous pulse ox, frequent blood pressure checks, IV access and oxygen  Block type: Femoral  Adductor canal  Laterality: right  Injection technique: single-shot  Guidance: ultrasound guided    Needle   Needle type: insulated echogenic nerve stimulator needle   Needle gauge: 21 G  Needle localization: ultrasound guidance  Needle length: 10 cm  Assessment   Injection assessment: negative aspiration for heme, no paresthesia on injection, local visualized surrounding nerve on ultrasound and no intravascular symptoms  Paresthesia pain: none  Slow fractionated injection: yes  Hemodynamics: stable  Outcomes: patient tolerated procedure well and uncomplicated    Additional Notes  -Block placed for post op pain at surgeon's request.     -Ultrasound used to identify anatomy of nerve bundle.    -Needle placement and local injection at perineural area confirmed with real time ultrasound guidance.     -Local visualized with ultrasound surrounding nerve.    -Permanent Image taken and placed on chart.      Medications Administered  dexAMETHasone (DECADRON) (PF) 10 mg/mL injection - Other   4 mg - 2/12/2025 8:01:00 AM  ROPivacaine 0.2% with EPINEPHrine 1:200,000 injection (ANESTH) (Mixture components:

## 2025-02-12 NOTE — OP NOTE
Operative Note      Patient: Glenda Scruggs  YOB: 1965  MRN: [id number]    Date of Procedure: 2/12/2025    Pre-Op Diagnosis Codes:      * Primary osteoarthritis of right knee [M17.11]    Post-Op Diagnosis: Same       Procedure(s):  RIGHT KNEE TOTAL ARTHROPLASTY-SDD    Surgeon(s):  David Collado MD    Assistant:   First Assistant: Katlin Green    Anesthesia: Spinal    Estimated Blood Loss (mL): less than 50     Complications: None    Specimens:   * No specimens in log *    Implants:  Implant Name Type Inv. Item Serial No.  Lot No. LRB No. Used Action   CEMENT BNE 20GM HALF DOSE PMMA VISC RADPQ FAST - [id number]  CEMENT BNE 20GM HALF DOSE PMMA VISC RADPQ FAST  [facility] [id number] Right 1 Implanted   INSERT TIB FIX BEAR 6 6 MM RT MEDL KNEE STBL AOX ATTUNE - [id number]  INSERT TIB FIX BEAR 6 6 MM RT MEDL KNEE STBL AOX ATTUNE  [facility] [id number] Right 1 Implanted   ATTUNE FEM RJ POR CR RT SZ 6 - [id number]  ATTUNE FEM RJ POR CR RT SZ 6  [facility] [id number] Right 1 Implanted   BEARING TIB FIX 5 KNEE BASE POROUS ATTUNE AFFIXIUM - [id number]  BEARING TIB FIX 5 KNEE BASE POROUS ATTUNE AFFIXIUM  [facility] [id number] Right 1 Implanted   COMPONENT PAT [id number] KNEE POLY DOME EZIO MEDIALIZED ATTUNE - [id number]  COMPONENT PAT [id number] KNEE POLY DOME EZIO MEDIALIZED ATTUNE  [facility] [id number] Right 1 Implanted         Drains: * No LDAs found *    Findings:  Infection Present At Time Of Surgery (PATOS) (choose all levels that have infection present):  No infection present  Other Findings: OA    Detailed Description of Procedure:        Indications for procedure:  The patient is a 59 y.o. female  with radiographic evidence of known end stage osteoarthritis of their knee and failure of conservative management, including but not limited to pain relievers, corticosteroids, home

## 2025-02-12 NOTE — ANESTHESIA POSTPROCEDURE EVALUATION
Department of Anesthesiology  Postprocedure Note    Patient: Glenda Scruggs  MRN: [id number]  YOB: 1965  Date of evaluation: 2/12/2025    Procedure Summary       Date: 02/12/25 Room / Location: [facility] MAIN OR  / [facility] MAIN OR    Anesthesia Start: 0807 Anesthesia Stop: 1001    Procedure: RIGHT KNEE TOTAL ARTHROPLASTY-SDD (Right: Knee) Diagnosis:       Primary osteoarthritis of right knee      (Primary osteoarthritis of right knee [M17.11])    Surgeons: David Collado MD Responsible Provider: Mata Mendez MD    Anesthesia Type: Spinal ASA Status: 2            Anesthesia Type: Spinal    Stephan Phase I: Stephan Score: 9    Stephan Phase II:      Anesthesia Post Evaluation    Patient location during evaluation: PACU  Patient participation: complete - patient participated  Level of consciousness: awake  Airway patency: patent  Nausea & Vomiting: no nausea and no vomiting  Cardiovascular status: hemodynamically stable  Respiratory status: acceptable, nonlabored ventilation and spontaneous ventilation  Hydration status: stable  Multimodal analgesia pain management approach    No notable events documented.

## 2025-02-12 NOTE — PROGRESS NOTES
TRANSFER - IN REPORT:    Verbal report received from Shanell on Glenda Scruggs  being received from PACU for routine post-op      Report consisted of patient's Situation, Background, Assessment and   Recommendations(SBAR).     Information from the following report(s) Nurse Handoff Report was reviewed with the receiving nurse.    Opportunity for questions and clarification was provided.      Assessment completed upon patient's arrival to unit and care assumed.

## 2025-02-12 NOTE — PERIOP NOTE
MD Mendez at bedside with patient. Pt VSS stable. Pain and Nausea controlled at this time. Verbal sign out per MD when pacu care is completed. Plan of care continues.

## 2025-02-12 NOTE — ANESTHESIA PRE PROCEDURE
Adverse effect of anesthesia     delayed awakening; pt reports also teary post-op    Anxiety     mananged with meds    BMI 36.0-36.9,adult     Depression     Diabetes (HCC)     Type 2, oral med, ozempic, does not check bs at home, denies hypo, A1c-6.9 on 2/4/25    Dyslipidemia     on med for control    GERD (gastroesophageal reflux disease)     Pepcid    Headache     History of kidney stones     Hypertension     managed with meds    OA (osteoarthritis) of knee     CRICKET on CPAP     instructed to bring c-pap dos    Thyroid disease     hypo       Past Surgical History:        Procedure Laterality Date    BREAST REDUCTION SURGERY  1999    COLONOSCOPY  2017    DILATION AND CURETTAGE OF UTERUS  10/2017    endometrial ablation    FRAGMENT KIDNEY STONE/ ESWL      HEENT      GUM SURGURY    HEENT  2018    sinus    HERNIA REPAIR Bilateral 01/22/2024    HERNIA INGUINAL REPAIR LAPAROSCOPIC ROBOTIC performed by Cherelle Kennedy MD [place] [facility] [place]    SHOULDER ARTHROSCOPY Left     Slap tear repair    TRABECULECTOMY Bilateral 2011    eyes; Dr. Anguiano       Social History:    Social History     Tobacco Use    Smoking status: Never     Passive exposure: Never    Smokeless tobacco: Never   Substance Use Topics    Alcohol use: Yes     Comment: Social, not every week                                Counseling given: Not Answered      Vital Signs (Current):   Vitals:    02/12/25 0612   BP: 122/87   Pulse: 79   Resp: 16   Temp: 97.2 °F (36.2 °C)   TempSrc: Temporal   SpO2: 94%   Weight: 91.3 kg (201 lb 3.2 oz)   Height: 1.575 m (5' 2\")                                              BP Readings from Last 3 Encounters:   02/12/25 122/87   02/04/25 133/88   01/16/25 122/78       NPO Status: Time of last liquid consumption: 2100                        Time of last solid consumption: 2100                        Date of last liquid consumption: 02/11/25                        Date of last solid food consumption: 02/10/25    BMI:   Wt

## 2025-02-12 NOTE — PERIOP NOTE
TRANSFER - OUT REPORT:    Verbal report given to LUIZ Ojeda on Glenda Scruggs  being transferred to room 331 for routine progression of patient care       Report consisted of patient's Situation, Background, Assessment and   Recommendations(SBAR).     Information from the following report(s) Nurse Handoff Report, Adult Overview, and MAR was reviewed with the receiving nurse.           Lines:   Peripheral IV 02/12/25 Left;Posterior Hand (Active)   Site Assessment Clean, dry & intact 02/12/25 1000   Line Status Infusing 02/12/25 1000   Line Care Connections checked and tightened 02/12/25 1000   Phlebitis Assessment No symptoms 02/12/25 1000   Infiltration Assessment 0 02/12/25 1000   Alcohol Cap Used No 02/12/25 1000   Dressing Status Clean, dry & intact 02/12/25 1000   Dressing Type Transparent 02/12/25 1000        Opportunity for questions and clarification was provided.      Patient transported with:  O2 @ 0lpm

## 2025-02-12 NOTE — CARE COORDINATION
Patient is a 59 y.o. year old female admitted for Right TKA . Patient plans to return home on discharge. Order received to arrange home health. Patient without preference towards agency. Referral sent to [facility] by Lois. Patient denies any equipment needs as patient has a walker.  Will follow until discharge.      02/12/25 8204   Service Assessment   Patient Orientation Alert and Oriented   Cognition Alert   History Provided By Patient   Services At/After Discharge   Transition of Care Consult (CM Consult) Home Health   Internal Home Health Yes   Services At/After Discharge Home Health   Confirm Follow Up Transport Self   Condition of Participation: Discharge Planning   The Plan for Transition of Care is related to the following treatment goals: improve mobility   The Patient and/or Patient Representative was provided with a Choice of Provider? Patient   The Patient and/Or Patient Representative agree with the Discharge Plan? Yes   Freedom of Choice list was provided with basic dialogue that supports the patient's individualized plan of care/goals, treatment preferences, and shares the quality data associated with the providers?  Yes

## 2025-02-12 NOTE — PROGRESS NOTES
4 Eyes Skin Assessment     NAME:  Glenda Scruggs  YOB: 1965  MEDICAL RECORD NUMBER:  [id number]    The patient is being assessed for  Post-Op Surgical    I agree that at least one RN has performed a thorough Head to Toe Skin Assessment on the patient. ALL assessment sites listed below have been assessed.      Areas assessed by both nurses:    Head, Face, Ears, Shoulders, Back, Chest, Arms, Elbows, Hands, Sacrum. Buttock, Coccyx, Ischium, Legs. Feet and Heels, and Under Medical Devices         Does the Patient have a Wound? No noted wound(s)       Soy Prevention initiated by RN: Yes  Wound Care Orders initiated by RN: No    Pressure Injury (Stage 3,4, Unstageable, DTI, NWPT, and Complex wounds) if present, place Wound referral order by RN under : No    New Ostomies, if present place, Ostomy referral order under : No     Nurse 1 eSignature: Electronically signed by KAILA SALAZAR RN on 2/12/25 at 11:01 AM EST    **SHARE this note so that the co-signing nurse can place an eSignature**    Nurse 2 eSignature: Electronically signed by Radha Wolf RN on 2/12/25 at 11:07 AM EST

## 2025-02-12 NOTE — PROGRESS NOTES
Pt received to Rm 331. Pt alert and oriented. Oriented pt to room and bed controls. Family at bedside.

## 2025-02-13 ENCOUNTER — TELEPHONE (OUTPATIENT)
Dept: ORTHOPEDICS UNIT | Age: 60
End: 2025-02-13

## 2025-02-13 NOTE — TELEPHONE ENCOUNTER
Called to follow up after TKA with SDD on 2/12/25.  Doing pretty good.  Post op pain is managed.  Is walking frequently.  Answered question regarding knee dressing.  Verified that [facility] will be scheduled for tomorrow. No questions or concerns.  Reviewed contact number for ortho navigator.

## 2025-02-14 ENCOUNTER — TELEPHONE (OUTPATIENT)
Dept: ORTHOPEDIC SURGERY | Age: 60
End: 2025-02-14

## 2025-02-14 DIAGNOSIS — Z96.651 S/P TOTAL KNEE ARTHROPLASTY, RIGHT: Primary | ICD-10-CM

## 2025-02-14 NOTE — TELEPHONE ENCOUNTER
She is doing her initial eval today. She is needing an order for a bandage change. She does have drainage. Please give her a call while she is there if you can.

## 2025-02-14 NOTE — TELEPHONE ENCOUNTER
Spoke with Jolynn who states that Eve Pak from the hospital stated the patient may need a dressing change for today. Jolynn states that there is drainage at the distal end and is going outside the Aquacel. Jolynn will change and replace with a new Aquacel and will call back with any concerns.  Jolynn verbalized understanding and voiced no other concerns at this time.

## 2025-02-18 ENCOUNTER — TELEPHONE (OUTPATIENT)
Dept: ORTHOPEDIC SURGERY | Age: 60
End: 2025-02-18

## 2025-02-18 DIAGNOSIS — Z96.651 HISTORY OF TOTAL KNEE ARTHROPLASTY, RIGHT: Primary | ICD-10-CM

## 2025-02-18 RX ORDER — OXYCODONE HYDROCHLORIDE 5 MG/1
5 TABLET ORAL EVERY 4 HOURS PRN
Qty: 30 TABLET | Refills: 0 | Status: SHIPPED | OUTPATIENT
Start: 2025-02-18 | End: 2025-02-25

## 2025-02-18 RX ORDER — TRAMADOL HYDROCHLORIDE 50 MG/1
50 TABLET ORAL EVERY 6 HOURS PRN
Qty: 28 TABLET | Refills: 0 | Status: SHIPPED | OUTPATIENT
Start: 2025-02-18 | End: 2025-02-25

## 2025-02-18 NOTE — TELEPHONE ENCOUNTER
She is requesting a refill on Tramadol and Oxycodone to [facility] on [address] [place]. She will run out in a couple of days.

## 2025-02-19 PROBLEM — Z96.651 HISTORY OF TOTAL KNEE ARTHROPLASTY, RIGHT: Status: ACTIVE | Noted: 2024-08-10

## 2025-02-19 NOTE — ASSESSMENT & PLAN NOTE
Orders:    rosuvastatin (CRESTOR) 5 MG tablet; Take 1 tablet by mouth daily    Comprehensive Metabolic Panel; Future    Lipid Panel; Future

## 2025-02-19 NOTE — PROGRESS NOTES
Glenda Scruggs  (1965) is an established patient, here for evaluation of the following:    Assessment & Plan   Below is the assessment and plan developed based on review of pertinent history, physical exam, labs, studies, and medications.    Assessment & Plan  Type 2 diabetes mellitus with hyperglycemia, without long-term current use of insulin (HCC)     Diabetes is not treated to goal.  Her HgA1C has incresed to 7.1% since last October.  (It appears that she also had a HgA1c checked by ortho on 2/4/25, prior to surgery). currently prescribed:   Diabetic Medications       Biguanides       metFORMIN (GLUCOPHAGE) 500 MG tablet Take 1 tablet by mouth 2 times daily (with meals) TAKE ONE TABLET BY MOUTH ONE TIME DAILY AT NIGHT       Incretin Mimetic Agents       semaglutide, 2 MG/DOSE, (OZEMPIC) 8 MG/3ML SOPN sc injection Inject 2 mg into the skin every 7 days   A low glycemic diet is encouraged, along w/ exercise and weight loss.      BP today is: well controlled.   Currently prescribed:   Hypertension Medications       ACE Inhibitors       lisinopril (PRINIVIL;ZESTRIL) 5 MG tablet Take 1 tablet by mouth daily TAKE ONE TABLET BY MOUTH ONE TIME DAILY AT NIGHT   Taking for renal protection since she's diabetic.    Orders:    Hemoglobin A1C; Future    Comprehensive Metabolic Panel; Future    Albumin/Creatinine Ratio, Urine; Future    Hypothyroidism, adult       Orders:    TSH; Future    Dyslipidemia       Orders:    rosuvastatin (CRESTOR) 5 MG tablet; Take 1 tablet by mouth daily    Comprehensive Metabolic Panel; Future    Lipid Panel; Future    Hot flashes    Controlled w/ Gabapentin.      Orders:    gabapentin (NEURONTIN) 100 MG capsule; Take 1 capsule by mouth nightly for 180 days. For Hot Flashes    History of total knee arthroplasty, right     2/12/25:  Rt knee replacement.  Oxycodone has been causing itching.   (Not terrible).  Pt to stick w/ Tramadol which was also dispensed.  She has had 2 [facility]H PT visits

## 2025-02-19 NOTE — ASSESSMENT & PLAN NOTE
Diabetes is not treated to goal.  Her HgA1C has incresed to 7.1% since last October.  (It appears that she also had a HgA1c checked by ortho on 2/4/25, prior to surgery). currently prescribed:   Diabetic Medications       Biguanides       metFORMIN (GLUCOPHAGE) 500 MG tablet Take 1 tablet by mouth 2 times daily (with meals) TAKE ONE TABLET BY MOUTH ONE TIME DAILY AT NIGHT       Incretin Mimetic Agents       semaglutide, 2 MG/DOSE, (OZEMPIC) 8 MG/3ML SOPN sc injection Inject 2 mg into the skin every 7 days   A low glycemic diet is encouraged, along w/ exercise and weight loss.      BP today is: well controlled.   Currently prescribed:   Hypertension Medications       ACE Inhibitors       lisinopril (PRINIVIL;ZESTRIL) 5 MG tablet Take 1 tablet by mouth daily TAKE ONE TABLET BY MOUTH ONE TIME DAILY AT NIGHT   Taking for renal protection since she's diabetic.    Orders:    Hemoglobin A1C; Future    Comprehensive Metabolic Panel; Future    Albumin/Creatinine Ratio, Urine; Future

## 2025-02-20 ENCOUNTER — TELEMEDICINE (OUTPATIENT)
Dept: FAMILY MEDICINE CLINIC | Facility: CLINIC | Age: 60
End: 2025-02-20
Payer: COMMERCIAL

## 2025-02-20 DIAGNOSIS — E03.9 HYPOTHYROIDISM, ADULT: ICD-10-CM

## 2025-02-20 DIAGNOSIS — R23.2 HOT FLASHES: ICD-10-CM

## 2025-02-20 DIAGNOSIS — E11.65 TYPE 2 DIABETES MELLITUS WITH HYPERGLYCEMIA, WITHOUT LONG-TERM CURRENT USE OF INSULIN (HCC): Primary | ICD-10-CM

## 2025-02-20 DIAGNOSIS — E78.5 DYSLIPIDEMIA: ICD-10-CM

## 2025-02-20 DIAGNOSIS — Z96.651 HISTORY OF TOTAL KNEE ARTHROPLASTY, RIGHT: ICD-10-CM

## 2025-02-20 PROCEDURE — G2211 COMPLEX E/M VISIT ADD ON: HCPCS | Performed by: PHYSICIAN ASSISTANT

## 2025-02-20 PROCEDURE — 2022F DILAT RTA XM EVC RTNOPTHY: CPT | Performed by: PHYSICIAN ASSISTANT

## 2025-02-20 PROCEDURE — 1036F TOBACCO NON-USER: CPT | Performed by: PHYSICIAN ASSISTANT

## 2025-02-20 PROCEDURE — G8427 DOCREV CUR MEDS BY ELIG CLIN: HCPCS | Performed by: PHYSICIAN ASSISTANT

## 2025-02-20 PROCEDURE — 99214 OFFICE O/P EST MOD 30 MIN: CPT | Performed by: PHYSICIAN ASSISTANT

## 2025-02-20 PROCEDURE — 3051F HG A1C>EQUAL 7.0%<8.0%: CPT | Performed by: PHYSICIAN ASSISTANT

## 2025-02-20 PROCEDURE — G8417 CALC BMI ABV UP PARAM F/U: HCPCS | Performed by: PHYSICIAN ASSISTANT

## 2025-02-20 PROCEDURE — 3017F COLORECTAL CA SCREEN DOC REV: CPT | Performed by: PHYSICIAN ASSISTANT

## 2025-02-20 RX ORDER — ROSUVASTATIN CALCIUM 5 MG/1
5 TABLET, COATED ORAL DAILY
Qty: 90 TABLET | Refills: 1 | Status: SHIPPED | OUTPATIENT
Start: 2025-02-20

## 2025-02-20 RX ORDER — GABAPENTIN 100 MG/1
100 CAPSULE ORAL NIGHTLY
Qty: 90 CAPSULE | Refills: 1 | Status: SHIPPED | OUTPATIENT
Start: 2025-02-20 | End: 2025-08-19

## 2025-02-20 RX ORDER — LISINOPRIL 5 MG/1
5 TABLET ORAL DAILY
Qty: 90 TABLET | Refills: 1 | Status: SHIPPED | OUTPATIENT
Start: 2025-02-20

## 2025-02-20 NOTE — ASSESSMENT & PLAN NOTE
Controlled w/ Gabapentin.      Orders:    gabapentin (NEURONTIN) 100 MG capsule; Take 1 capsule by mouth nightly for 180 days. For Hot Flashes

## 2025-02-26 ENCOUNTER — OFFICE VISIT (OUTPATIENT)
Dept: ORTHOPEDIC SURGERY | Age: 60
End: 2025-02-26

## 2025-02-26 DIAGNOSIS — Z96.651 HISTORY OF TOTAL KNEE ARTHROPLASTY, RIGHT: Primary | ICD-10-CM

## 2025-02-26 PROCEDURE — 99024 POSTOP FOLLOW-UP VISIT: CPT | Performed by: ORTHOPAEDIC SURGERY

## 2025-02-26 RX ORDER — MELOXICAM 15 MG/1
15 TABLET ORAL DAILY
Qty: 30 TABLET | Refills: 0 | Status: SHIPPED | OUTPATIENT
Start: 2025-02-26

## 2025-02-26 RX ORDER — METHYLPREDNISOLONE 4 MG/1
TABLET ORAL
Qty: 1 KIT | Refills: 0 | Status: SHIPPED | OUTPATIENT
Start: 2025-02-26 | End: 2025-03-04

## 2025-02-26 RX ORDER — OXYCODONE HYDROCHLORIDE 5 MG/1
5 TABLET ORAL EVERY 4 HOURS PRN
Qty: 30 TABLET | Refills: 0 | Status: SHIPPED | OUTPATIENT
Start: 2025-02-26 | End: 2025-03-05

## 2025-02-26 NOTE — PROGRESS NOTES
Name: Glenda Scruggs  YOB: 1965  Gender: female  MRN: [id number]      Current Outpatient Medications:     meloxicam (MOBIC) 15 MG tablet, Take 1 tablet by mouth daily, Disp: 30 tablet, Rfl: 0    methylPREDNISolone (MEDROL DOSEPACK) 4 MG tablet, Take by mouth., Disp: 1 kit, Rfl: 0    oxyCODONE (ROXICODONE) 5 MG immediate release tablet, Take 1 tablet by mouth every 4 hours as needed for Pain for up to 7 days. Intended supply: 7 days. Take lowest dose possible to manage pain Max Daily Amount: 30 mg, Disp: 30 tablet, Rfl: 0    diphenhydrAMINE HCl (BENADRYL ALLERGY PO), Take by mouth, Disp: , Rfl:     gabapentin (NEURONTIN) 100 MG capsule, Take 1 capsule by mouth nightly for 180 days. For Hot Flashes, Disp: 90 capsule, Rfl: 1    lisinopril (PRINIVIL;ZESTRIL) 5 MG tablet, Take 1 tablet by mouth daily TAKE ONE TABLET BY MOUTH ONE TIME DAILY AT NIGHT, Disp: 90 tablet, Rfl: 1    metFORMIN (GLUCOPHAGE) 500 MG tablet, Take 1 tablet by mouth 2 times daily (with meals) TAKE ONE TABLET BY MOUTH ONE TIME DAILY AT NIGHT, Disp: 180 tablet, Rfl: 1    rosuvastatin (CRESTOR) 5 MG tablet, Take 1 tablet by mouth daily, Disp: 90 tablet, Rfl: 1    semaglutide, 2 MG/DOSE, (OZEMPIC) 8 MG/3ML SOPN sc injection, Inject 2 mg into the skin every 7 days, Disp: 9 mL, Rfl: 1    aspirin 81 MG EC tablet, Take 1 tablet by mouth in the morning and 1 tablet in the evening., Disp: 70 tablet, Rfl: 0    celecoxib (CELEBREX) 200 MG capsule, Take 1 capsule by mouth in the morning and at bedtime, Disp: 60 capsule, Rfl: 1    sennosides-docusate sodium (SENOKOT-S) 8.6-50 MG tablet, Take 1 tablet by mouth daily, Disp: 30 tablet, Rfl: 1    acetaminophen (TYLENOL) 325 MG tablet, Take 3 tablets by mouth in the morning and 3 tablets at noon and 3 tablets in the evening., Disp: 60 tablet, Rfl: 2    Vitamin D-Vitamin K (VITAMIN K2-VITAMIN D3 PO), Take 1 capsule by mouth at bedtime, Disp: , Rfl:     vilazodone HCl (VIIBRYD) 40 MG TABS, Take 1

## 2025-03-05 NOTE — PROGRESS NOTES
Activity/Exercise Parameters Parameters Parameters   Pt ed on biomechanics, PT POC, HEP, outcomes, prognosis, BLE strengthening education, BLE ROM education,TKA procedure/protocol/prognosis outlook discussions, red flag s/s of a failed TKA procedure    15 mins      Heel slides  2 x 60 sec holds BLE     Supine SLR hamstring stretch  2 x 60 sec holds BLE                   THERAPEUTIC ACTIVITY: (  minutes):    Therapeutic activities per grid below to improve mobility, strength, balance and coordination.Required visual, verbal, manual and tactile cues to promote motor control of bilateral, lower extremity(s).   Date:   Date:   Date:     Activity/Exercise Parameters Parameters Parameters                       NEUROMUSCULAR RE-EDUCATION: ( minutes):    Exercise/activities per grid below to improve balance, coordination, kinesthetic sense, posture and proprioception.  Required visual, verbal, manual and tactile cues to promote motor control of bilateral, lower extremity(s).   Date:   Date:   Date:     Activity/Exercise Parameters Parameters Parameters                       MANUAL THERAPY: ( minutes):   Joint mobilization, Soft tissue mobilization, Manipulation and Manual lymphatic drainage was utilized and necessary because of the patient's restricted joint motion, painful spasm, loss of articular motion and restricted motion of soft tissue.    Date:   Date:   Date:     Activity/Exercise Parameters Parameters Parameters                       MODALITIES: ( minutes):         Therapy in order to provide analgesia, relieve muscle spasm and reduce inflammation and edema.            Treatment/Session Summary:    Treatment Assessment:   Pt does present with RLE knee pain secondary to R-TKA. Pt does have diffuse RLE ROM deficits and weakness but does present well with most motions and strength planes. Pt was educated on HEP. Will progress prn.     Communication/Consultation:  None today  Equipment provided today:

## 2025-03-05 NOTE — THERAPY EVALUATION
Glenda Scruggs  : 1965  Primary: [facility] ([facility])  Secondary:  [facility] [place] [address] [place]  [facility] [address]  Phone: [phone]  Fax: [phone] Plan Frequency: 2x a week for up to 90 days (1x a week for up to 90 days for pt preference)    Plan of Care/Certification Expiration Date: 25        Plan of Care/Certification Expiration Date:  Plan of Care/Certification Expiration Date: 25    Frequency/Duration: Plan Frequency: 2x a week for up to 90 days (1x a week for up to 90 days for pt preference)      Time In/Out:   Time In: 931  Time Out:       PT Visit Info:    Progress Note Counter: 1      Visit Count:  1                OUTPATIENT PHYSICAL THERAPY:             Initial Assessment 3/7/2025               Episode (R-TKA knee pain)         Treatment Diagnosis:     Right knee pain, unspecified chronicity  Status post total right knee replacement  Medical/Referring Diagnosis:    S/P total knee arthroplasty, right    Referring Physician:  David Collado MD MD Orders:  PT Eval and Treat   Return MD Appt:    Date of Onset:      Allergies:  Amoxicillin and Oxycodone  Restrictions/Precautions:    None      Medications Last Reviewed: 3/7/2025     SUBJECTIVE   History of Injury/Illness (Reason for Referral):  Pt arrives to the OPPT clinic w/ RLE Knee pain secondary to s/p R-TKA performed on, 25. Pt arrives into the OPPT clinic w/o an antalgic gait pattern. Pt reports that she had some prior PT for her R-TKA and reports that since then she feels pretty good. Pt reports now that since her R-TKA she reports having difficulty with ambulating farther distances and prolonged standing. Pt reports that putting on shoes, tying shoes and twisting or turning corners still gives her pain. Pt reports that she can navigate stairs in her home however does report weakness and instability after ambulating/navigating

## 2025-03-07 ENCOUNTER — HOSPITAL ENCOUNTER (OUTPATIENT)
Dept: PHYSICAL THERAPY | Age: 60
Setting detail: RECURRING SERIES
Discharge: HOME | End: 2025-03-09
Attending: ORTHOPAEDIC SURGERY
Payer: COMMERCIAL

## 2025-03-07 DIAGNOSIS — Z96.651 STATUS POST TOTAL RIGHT KNEE REPLACEMENT: ICD-10-CM

## 2025-03-07 DIAGNOSIS — M25.561 RIGHT KNEE PAIN, UNSPECIFIED CHRONICITY: Primary | ICD-10-CM

## 2025-03-07 PROCEDURE — 97110 THERAPEUTIC EXERCISES: CPT

## 2025-03-07 PROCEDURE — 97161 PT EVAL LOW COMPLEX 20 MIN: CPT

## 2025-03-13 ENCOUNTER — TELEPHONE (OUTPATIENT)
Dept: ORTHOPEDIC SURGERY | Age: 60
End: 2025-03-13

## 2025-03-13 DIAGNOSIS — Z96.651 HISTORY OF TOTAL KNEE ARTHROPLASTY, RIGHT: Primary | ICD-10-CM

## 2025-03-13 RX ORDER — TRAMADOL HYDROCHLORIDE 50 MG/1
50 TABLET ORAL EVERY 6 HOURS PRN
Qty: 28 TABLET | Refills: 0 | Status: SHIPPED | OUTPATIENT
Start: 2025-03-13 | End: 2025-03-20

## 2025-03-19 ENCOUNTER — HOSPITAL ENCOUNTER (OUTPATIENT)
Dept: PHYSICAL THERAPY | Age: 60
Setting detail: RECURRING SERIES
Discharge: HOME | End: 2025-03-21
Attending: ORTHOPAEDIC SURGERY
Payer: COMMERCIAL

## 2025-03-19 PROCEDURE — 97140 MANUAL THERAPY 1/> REGIONS: CPT

## 2025-03-19 PROCEDURE — 97110 THERAPEUTIC EXERCISES: CPT

## 2025-03-19 NOTE — PROGRESS NOTES
Glenda Scruggs  : 1965  Primary: [facility] [address] (Commercial)  Secondary:  [facility] [place] [address]  [facility] [address]  Phone: [phone]  Fax: [phone] Plan Frequency: 2x a week for up to 90 days (1x a week for up to 90 days for pt preference)  Plan of Care/Certification Expiration Date: 25        Plan of Care/Certification Expiration Date:  Plan of Care/Certification Expiration Date: 25    Frequency/Duration: Plan Frequency: 2x a week for up to 90 days (1x a week for up to 90 days for pt preference)      Time In/Out:   Time In: 6  Time Out: 1457      PT Visit Info:    Progress Note Counter: 2      Visit Count:  2    OUTPATIENT PHYSICAL THERAPY:   Treatment Note 3/19/2025       Episode  (R-TKA knee pain)               Treatment Diagnosis:    Right knee pain, unspecified chronicity  Status post total right knee replacement  Medical/Referring Diagnosis:    S/P total knee arthroplasty, right    Referring Physician:  David Collado MD MD Orders:  PT Eval and Treat   Return MD Appt:     Date of Onset:  No data recorded   Allergies:   Amoxicillin and Oxycodone  Restrictions/Precautions:   None      Interventions Planned (Treatment may consist of any combination of the following):     See Assessment Note    Subjective Comments:   Patient is doing well.  Stated she has not driven much, so will practice that this weekend.     Initial Pain Level: 1-2/10  Post Session Pain Level: 1/10    Medications Last Reviewed: 3/19/2025  Updated Objective Findings:   Measured at 119 degrees flexion   Treatment   THERAPEUTIC EXERCISE: (30 minutes):    Exercises per grid below to improve mobility, strength, balance and coordination.  Required visual, verbal, manual and tactile cues to promote proper body alignment, promote proper body posture, promote proper body mechanics and promote proper body breathing techniques.  Progressed

## 2025-03-20 ENCOUNTER — TELEPHONE (OUTPATIENT)
Dept: ORTHOPEDIC SURGERY | Age: 60
End: 2025-03-20

## 2025-03-21 ENCOUNTER — HOSPITAL ENCOUNTER (OUTPATIENT)
Dept: PHYSICAL THERAPY | Age: 60
Setting detail: RECURRING SERIES
Discharge: HOME | End: 2025-03-23
Attending: ORTHOPAEDIC SURGERY
Payer: COMMERCIAL

## 2025-03-21 PROCEDURE — 97110 THERAPEUTIC EXERCISES: CPT

## 2025-03-21 NOTE — TELEPHONE ENCOUNTER
Spoke with patient and informed her that Dr. Collado said it was ok for her to return to work on Monday and to drive.   Patient aware that she should see the letter in [facility] and that I will also send it in the mail.  Patient verbalized understanding and voiced no other concerns at this time.

## 2025-03-21 NOTE — PROGRESS NOTES
Glenda Scruggs  : 1965  Primary: [facility] [address] (Commercial)  Secondary:  [facility] [place] [address]  [facility] [address]  Phone: [phone]  Fax: [phone] Plan Frequency: 2x a week for up to 90 days (1x a week for up to 90 days for pt preference)  Plan of Care/Certification Expiration Date: 25        Plan of Care/Certification Expiration Date:  Plan of Care/Certification Expiration Date: 25    Frequency/Duration: Plan Frequency: 2x a week for up to 90 days (1x a week for up to 90 days for pt preference)      Time In/Out:   Time In: 0800  Time Out: 0840      PT Visit Info:    Progress Note Counter: 3      Visit Count:  3    OUTPATIENT PHYSICAL THERAPY:   Treatment Note 3/21/2025       Episode  (R-TKA knee pain)               Treatment Diagnosis:    Right knee pain, unspecified chronicity  Status post total right knee replacement  Medical/Referring Diagnosis:    S/P total knee arthroplasty, right    Referring Physician:  David Collado MD MD Orders:  PT Eval and Treat   Return MD Appt:     Date of Onset:  No data recorded   Allergies:   Amoxicillin and Oxycodone  Restrictions/Precautions:   None      Interventions Planned (Treatment may consist of any combination of the following):     See Assessment Note    Subjective Comments:   Patient is doing well.  Stated she has not driven much, so will practice that this weekend.     Initial Pain Level: 1-2/10  Post Session Pain Level: 1/10    Medications Last Reviewed: 3/21/2025  Updated Objective Findings:   Measured at 119 degrees flexion   Treatment   THERAPEUTIC EXERCISE: (30 minutes):    Exercises per grid below to improve mobility, strength, balance and coordination.  Required visual, verbal, manual and tactile cues to promote proper body alignment, promote proper body posture, promote proper body mechanics and promote proper body breathing techniques.  Progressed

## 2025-03-24 ENCOUNTER — HOSPITAL ENCOUNTER (OUTPATIENT)
Dept: PHYSICAL THERAPY | Age: 60
Setting detail: RECURRING SERIES
Discharge: HOME | End: 2025-03-26
Attending: ORTHOPAEDIC SURGERY
Payer: COMMERCIAL

## 2025-03-24 PROCEDURE — 97140 MANUAL THERAPY 1/> REGIONS: CPT

## 2025-03-24 PROCEDURE — 97110 THERAPEUTIC EXERCISES: CPT

## 2025-03-24 NOTE — PROGRESS NOTES
Glenda Scruggs  : 1965  Primary: [facility] [address] (Commercial)  Secondary:  [facility] [place] [address]  [facility] [address]  Phone: [phone]  Fax: [phone] Plan Frequency: 2x a week for up to 90 days (1x a week for up to 90 days for pt preference)  Plan of Care/Certification Expiration Date: 25        Plan of Care/Certification Expiration Date:  Plan of Care/Certification Expiration Date: 25    Frequency/Duration: Plan Frequency: 2x a week for up to 90 days (1x a week for up to 90 days for pt preference)      Time In/Out:   Time In: 1600  Time Out: 1656      PT Visit Info:    Progress Note Counter: 4      Visit Count:  4    OUTPATIENT PHYSICAL THERAPY:   Treatment Note 3/24/2025       Episode  (R-TKA knee pain)               Treatment Diagnosis:    Right knee pain, unspecified chronicity  Status post total right knee replacement  Medical/Referring Diagnosis:    S/P total knee arthroplasty, right    Referring Physician:  David Collado MD MD Orders:  PT Eval and Treat   Return MD Appt:     Date of Onset:  No data recorded   Allergies:   Amoxicillin and Oxycodone  Restrictions/Precautions:   None      Interventions Planned (Treatment may consist of any combination of the following):     See Assessment Note    Subjective Comments:   Patient reports long day, first day back at the office.   She has had more pain and irritation over the weekend along the medial side of her incision.  She does have some slight edema.     Initial Pain Level: 4/10  Post Session Pain Level: 1-2/10    Medications Last Reviewed: 3/24/2025  Updated Objective Findings:   Mid-patellar circumference pre 40.6 cm  post 39.2 cm    Treatment   THERAPEUTIC EXERCISE: (39 minutes):    Exercises per grid below to improve mobility, strength, balance and coordination.  Required visual, verbal, manual and tactile cues to promote proper body alignment,

## 2025-03-26 ENCOUNTER — HOSPITAL ENCOUNTER (OUTPATIENT)
Dept: PHYSICAL THERAPY | Age: 60
Setting detail: RECURRING SERIES
Discharge: HOME | End: 2025-03-28
Attending: ORTHOPAEDIC SURGERY
Payer: COMMERCIAL

## 2025-03-26 PROCEDURE — 97110 THERAPEUTIC EXERCISES: CPT

## 2025-03-26 NOTE — PROGRESS NOTES
Glenda Scruggs  : 1965  Primary: [facility] [address] (Commercial)  Secondary:  [facility] [place] [address] [place] [address]  Phone: [phone]  Fax: [phone] Plan Frequency: 2x a week for up to 90 days (1x a week for up to 90 days for pt preference)  Plan of Care/Certification Expiration Date: 25        Plan of Care/Certification Expiration Date:  Plan of Care/Certification Expiration Date: 25    Frequency/Duration: Plan Frequency: 2x a week for up to 90 days (1x a week for up to 90 days for pt preference)      Time In/Out:   Time In: 933  Time Out:       PT Visit Info:    Progress Note Counter: 5      Visit Count:  5    OUTPATIENT PHYSICAL THERAPY:   Treatment Note 3/26/2025       Episode  (R-TKA knee pain)               Treatment Diagnosis:    Right knee pain, unspecified chronicity  Status post total right knee replacement  Medical/Referring Diagnosis:    S/P total knee arthroplasty, right    Referring Physician:  David Collado MD MD Orders:  PT Eval and Treat   Return MD Appt:     Date of Onset:  No data recorded   Allergies:   Amoxicillin and Oxycodone  Restrictions/Precautions:   None      Interventions Planned (Treatment may consist of any combination of the following):     See Assessment Note    Subjective Comments:   Patient drove for the first time on .  Patient had bad day at work emotionally.  Very stressed with going back to work. She has been sore all over.       Initial Pain Level: 3/10  Post Session Pain Level: 1-2/10    Medications Last Reviewed: 3/26/2025  Updated Objective Findings:   Less swelling and soreness     Treatment   THERAPEUTIC EXERCISE: (39 minutes):    Exercises per grid below to improve mobility, strength, balance and coordination.  Required visual, verbal, manual and tactile cues to promote proper body alignment, promote proper body posture, promote proper body mechanics

## 2025-03-28 ENCOUNTER — HOSPITAL ENCOUNTER (OUTPATIENT)
Dept: MAMMOGRAPHY | Age: 60
Discharge: HOME | End: 2025-03-31
Payer: COMMERCIAL

## 2025-03-28 VITALS — WEIGHT: 200 LBS | HEIGHT: 62 IN | BODY MASS INDEX: 36.8 KG/M2

## 2025-03-28 DIAGNOSIS — Z12.31 OTHER SCREENING MAMMOGRAM: ICD-10-CM

## 2025-03-28 PROCEDURE — 77063 BREAST TOMOSYNTHESIS BI: CPT

## 2025-03-31 ENCOUNTER — HOSPITAL ENCOUNTER (OUTPATIENT)
Dept: PHYSICAL THERAPY | Age: 60
Setting detail: RECURRING SERIES
Discharge: HOME | End: 2025-04-02
Attending: ORTHOPAEDIC SURGERY
Payer: COMMERCIAL

## 2025-03-31 PROCEDURE — 97110 THERAPEUTIC EXERCISES: CPT

## 2025-03-31 NOTE — PROGRESS NOTES
and promote proper body breathing techniques.  Progressed resistance, range, repetitions and complexity of movement as indicated.   Date:  3/21/25   Date:  3-24-25 Date:  3-26-25 Date:3/31/25     Activity/Exercise Parameters      Pt ed on biomechanics, PT POC, HEP, outcomes, prognosis, BLE strengthening education, BLE ROM education,TKA procedure/protocol/prognosis outlook discussions, red flag s/s of a failed TKA procedure          SCIFIT  10 mins  10 minutes   Level 1 Small Nustep   Level 2  10 minutes  Small Nustep   Level 2  10 minutes    Heel slides  X 40  X 20  X 30 R X 30 R   Supine SLR hamstring stretch  2 x 60 sec hold BLE By PTA  By PTA  2 x 60 sec hold BLE   SLR   2 x 10 R X 30 R X 20- 5 sec holds    SAQ 2 sets   10 x 5 sec hold R   X 10 R   Some pain    LAQ- no pain   2 x 10 R     Clams  X 20 GTB  X 30 R X 30 R GTB   Hip abduction  X 20 GTB  3 x 10  X 30    Bridge X 20- 5 sec holds 3 x 10  3 x 10  X 30    Supine 90/90 gravity assist knee flexion stretch, cues for controlled motion  3 x 30 sec holds RLE                THERAPEUTIC ACTIVITY: (4  minutes):    Therapeutic activities per grid below to improve mobility, strength, balance and coordination.Required visual, verbal, manual and tactile cues to promote motor control of bilateral, lower extremity(s).   Date:  3-26-25 Date:   Date:     Activity/Exercise Parameters Parameters Parameters   Working on placement of heel lift and rearfoot wedge 4 minutes                    NEUROMUSCULAR RE-EDUCATION: (0 minutes):    Exercise/activities per grid below to improve balance, coordination, kinesthetic sense, posture and proprioception.  Required visual, verbal, manual and tactile cues to promote motor control of bilateral, lower extremity(s).   Date:   Date:   Date:     Activity/Exercise Parameters Parameters Parameters                       MANUAL THERAPY: (0 minutes):   Joint mobilization, Soft tissue mobilization, Manipulation and Manual lymphatic drainage was

## 2025-04-02 ENCOUNTER — HOSPITAL ENCOUNTER (OUTPATIENT)
Dept: PHYSICAL THERAPY | Age: 60
Setting detail: RECURRING SERIES
Discharge: HOME | End: 2025-04-04
Attending: ORTHOPAEDIC SURGERY
Payer: COMMERCIAL

## 2025-04-02 ENCOUNTER — TELEPHONE (OUTPATIENT)
Dept: BEHAVIORAL/MENTAL HEALTH CLINIC | Age: 60
End: 2025-04-02

## 2025-04-02 DIAGNOSIS — F41.1 GENERALIZED ANXIETY DISORDER: ICD-10-CM

## 2025-04-02 DIAGNOSIS — F33.42 MDD (MAJOR DEPRESSIVE DISORDER), RECURRENT, IN FULL REMISSION: Primary | ICD-10-CM

## 2025-04-02 PROCEDURE — 97110 THERAPEUTIC EXERCISES: CPT

## 2025-04-02 NOTE — TELEPHONE ENCOUNTER
Patient stopped by and was wondering if you would send another referral for therapy. She has seen Roberto in the past per your referral   But she would like to see if you would send another referral for therapy but a female therapist. Please advise

## 2025-04-02 NOTE — PROGRESS NOTES
Glenda Scruggs  : 1965  Primary: [facility] [address] (Commercial)  Secondary:  [facility] @ [place] [address]  [facility] [address]  Phone: [phone]  Fax: [phone] Plan Frequency: 2x a week for up to 90 days (1x a week for up to 90 days for pt preference)  Plan of Care/Certification Expiration Date: 25        Plan of Care/Certification Expiration Date:  Plan of Care/Certification Expiration Date: 25    Frequency/Duration: Plan Frequency: 2x a week for up to 90 days (1x a week for up to 90 days for pt preference)      Time In/Out:   Time In: 1014  Time Out: 1057      PT Visit Info:    Progress Note Counter: 7      Visit Count:  7    OUTPATIENT PHYSICAL THERAPY:   Treatment Note 2025       Episode  (R-TKA knee pain)               Treatment Diagnosis:    Right knee pain, unspecified chronicity  Status post total right knee replacement  Medical/Referring Diagnosis:    S/P total knee arthroplasty, right    Referring Physician:  David Collado MD MD Orders:  PT Eval and Treat   Return MD Appt:     Date of Onset:  No data recorded   Allergies:   Amoxicillin and Oxycodone  Restrictions/Precautions:   None      Interventions Planned (Treatment may consist of any combination of the following):     See Assessment Note    Subjective Comments:   Pt reports no pain now, just stiffness and weakness     Initial Pain Level: 1/10  Post Session Pain Level: 1/10    Medications Last Reviewed: 2025  Updated Objective Findings:   Less swelling and soreness     Treatment   THERAPEUTIC EXERCISE: (38 minutes):    Exercises per grid below to improve mobility, strength, balance and coordination.  Required visual, verbal, manual and tactile cues to promote proper body alignment, promote proper body posture, promote proper body mechanics and promote proper body breathing techniques.  Progressed resistance, range, repetitions and complexity of

## 2025-04-09 ENCOUNTER — OFFICE VISIT (OUTPATIENT)
Dept: ORTHOPEDIC SURGERY | Age: 60
End: 2025-04-09

## 2025-04-09 ENCOUNTER — HOSPITAL ENCOUNTER (OUTPATIENT)
Dept: PHYSICAL THERAPY | Age: 60
Setting detail: RECURRING SERIES
Discharge: HOME | End: 2025-04-11
Attending: ORTHOPAEDIC SURGERY
Payer: COMMERCIAL

## 2025-04-09 ENCOUNTER — APPOINTMENT (OUTPATIENT)
Dept: PHYSICAL THERAPY | Age: 60
End: 2025-04-09
Attending: ORTHOPAEDIC SURGERY
Payer: COMMERCIAL

## 2025-04-09 VITALS — WEIGHT: 203.4 LBS | HEIGHT: 62 IN | BODY MASS INDEX: 37.43 KG/M2

## 2025-04-09 DIAGNOSIS — Z96.651 HISTORY OF TOTAL KNEE ARTHROPLASTY, RIGHT: Primary | ICD-10-CM

## 2025-04-09 PROCEDURE — 99024 POSTOP FOLLOW-UP VISIT: CPT | Performed by: ORTHOPAEDIC SURGERY

## 2025-04-09 PROCEDURE — 97110 THERAPEUTIC EXERCISES: CPT

## 2025-04-09 RX ORDER — MELOXICAM 15 MG/1
15 TABLET ORAL DAILY
Qty: 30 TABLET | Refills: 3 | Status: SHIPPED | OUTPATIENT
Start: 2025-04-09

## 2025-04-09 NOTE — PROGRESS NOTES
Name: Glenda Scruggs  YOB: 1965  Gender: female  MRN: [id number]      Current Outpatient Medications:     meloxicam (MOBIC) 15 MG tablet, Take 1 tablet by mouth daily, Disp: 30 tablet, Rfl: 3    meloxicam (MOBIC) 15 MG tablet, Take 1 tablet by mouth daily, Disp: 30 tablet, Rfl: 0    diphenhydrAMINE HCl (BENADRYL ALLERGY PO), Take by mouth, Disp: , Rfl:     gabapentin (NEURONTIN) 100 MG capsule, Take 1 capsule by mouth nightly for 180 days. For Hot Flashes, Disp: 90 capsule, Rfl: 1    lisinopril (PRINIVIL;ZESTRIL) 5 MG tablet, Take 1 tablet by mouth daily TAKE ONE TABLET BY MOUTH ONE TIME DAILY AT NIGHT, Disp: 90 tablet, Rfl: 1    metFORMIN (GLUCOPHAGE) 500 MG tablet, Take 1 tablet by mouth 2 times daily (with meals) TAKE ONE TABLET BY MOUTH ONE TIME DAILY AT NIGHT, Disp: 180 tablet, Rfl: 1    rosuvastatin (CRESTOR) 5 MG tablet, Take 1 tablet by mouth daily, Disp: 90 tablet, Rfl: 1    semaglutide, 2 MG/DOSE, (OZEMPIC) 8 MG/3ML SOPN sc injection, Inject 2 mg into the skin every 7 days, Disp: 9 mL, Rfl: 1    aspirin 81 MG EC tablet, Take 1 tablet by mouth in the morning and 1 tablet in the evening., Disp: 70 tablet, Rfl: 0    celecoxib (CELEBREX) 200 MG capsule, Take 1 capsule by mouth in the morning and at bedtime, Disp: 60 capsule, Rfl: 1    sennosides-docusate sodium (SENOKOT-S) 8.6-50 MG tablet, Take 1 tablet by mouth daily, Disp: 30 tablet, Rfl: 1    acetaminophen (TYLENOL) 325 MG tablet, Take 3 tablets by mouth in the morning and 3 tablets at noon and 3 tablets in the evening., Disp: 60 tablet, Rfl: 2    Vitamin D-Vitamin K (VITAMIN K2-VITAMIN D3 PO), Take 1 capsule by mouth at bedtime, Disp: , Rfl:     vilazodone HCl (VIIBRYD) 40 MG TABS, Take 1 tablet by mouth daily, Disp: 90 tablet, Rfl: 1    famotidine (PEPCID) 20 MG tablet, Take 1 tablet by mouth daily as needed (acid reflux), Disp: 90 tablet, Rfl: 0    levothyroxine (SYNTHROID) 50 MCG tablet, Take 1 tablet by mouth every morning (before

## 2025-04-09 NOTE — PROGRESS NOTES
supine to R IT band and quad  10 minutes   6 mins    Scar mobilization and STM to medial aspect of knee.   15 minutes             MODALITIES: ( minutes):         Therapy in order to provide analgesia, relieve muscle spasm and reduce inflammation and edema.            Treatment/Session Summary:    Treatment Assessment:   Pt tolerated tx well,pt looks to have some stiffness still and strength deficits but is improving. Pt reports now not as much pain if any at all. Pt feels like she is making really good improvements. Pt reports at times she feels unstable when walking for prolonged distances.  Will progress prn      Communication/Consultation:  None today  Equipment provided today:  None  Recommendations/Intent for next treatment session: Next visit will focus on BLE strengthening, BLE ROM and WB progressions   .    >Total Treatment Billable Duration:  38 minutes   Time In: 1100  Time Out: 1140     Gregory Brandt PT         Charge Capture  Events  [employer] Portal  Appt Desk  Attendance Report     Future Appointments   Date Time Provider Department Center   4/11/2025 10:15 AM Gregory Brandt, PT [facility]   4/16/2025  3:45 PM Gregory Brandt, PT [facility]   4/18/2025 10:15 AM Gregory Brandt, PT [facility]   4/23/2025  3:00 PM Gregory Brandt, PT [facility]   4/25/2025  3:45 PM Gregory Brandt, PT [facility]   4/28/2025  9:30 AM Gregory Brandt, PT [facility]   4/30/2025  9:30 AM Gregory Brandt, PT [facility]   7/11/2025 11:30 AM Sarah Briones MD [facility]   7/31/2025  9:30 AM [facility] LAB [facility] DEP   8/7/2025  8:00 AM Marianela Bailey PA [facility] DE[facility]   4/10/2026  8:50 AM David Collado MD [facility]

## 2025-04-11 ENCOUNTER — HOSPITAL ENCOUNTER (OUTPATIENT)
Dept: PHYSICAL THERAPY | Age: 60
Setting detail: RECURRING SERIES
Discharge: HOME | End: 2025-04-13
Attending: ORTHOPAEDIC SURGERY
Payer: COMMERCIAL

## 2025-04-11 PROCEDURE — 97110 THERAPEUTIC EXERCISES: CPT

## 2025-04-11 PROCEDURE — 97010 HOT OR COLD PACKS THERAPY: CPT

## 2025-04-11 NOTE — PROGRESS NOTES
Glenda Scruggs  : 1965  Primary: [facility] [address] (Commercial)  Secondary:  [facility] [place] [address]  [facility] [address]  Phone: [phone]  Fax: [phone] Plan Frequency: 2x a week for up to 90 days (1x a week for up to 90 days for pt preference)  Plan of Care/Certification Expiration Date: 25        Plan of Care/Certification Expiration Date:  Plan of Care/Certification Expiration Date: 25    Frequency/Duration: Plan Frequency: 2x a week for up to 90 days (1x a week for up to 90 days for pt preference)      Time In/Out:   Time In: 1011  Time Out: 1052      PT Visit Info:    Progress Note Counter: 9      Visit Count:  9    OUTPATIENT PHYSICAL THERAPY:   Treatment Note 2025       Episode  (R-TKA knee pain)               Treatment Diagnosis:    Right knee pain, unspecified chronicity  Status post total right knee replacement  Medical/Referring Diagnosis:    S/P total knee arthroplasty, right    Referring Physician:  David Collado MD MD Orders:  PT Eval and Treat   Return MD Appt:     Date of Onset:  No data recorded   Allergies:   Amoxicillin and Oxycodone  Restrictions/Precautions:   None      Interventions Planned (Treatment may consist of any combination of the following):     See Assessment Note    Subjective Comments:   Pt reports no pain now, just stiffness and weakness still some when standing or walking for prolonged distances/times     Initial Pain Level: 1/10  Post Session Pain Level: 1/10    Medications Last Reviewed: 2025  Updated Objective Findings:   Less swelling and soreness     Treatment   THERAPEUTIC EXERCISE: (30 minutes):    Exercises per grid below to improve mobility, strength, balance and coordination.  Required visual, verbal, manual and tactile cues to promote proper body alignment, promote proper body posture, promote proper body mechanics and promote proper body breathing

## 2025-04-16 ENCOUNTER — HOSPITAL ENCOUNTER (OUTPATIENT)
Dept: PHYSICAL THERAPY | Age: 60
Setting detail: RECURRING SERIES
Discharge: HOME | End: 2025-04-18
Attending: ORTHOPAEDIC SURGERY
Payer: COMMERCIAL

## 2025-04-16 PROCEDURE — 97110 THERAPEUTIC EXERCISES: CPT

## 2025-04-16 PROCEDURE — 97010 HOT OR COLD PACKS THERAPY: CPT

## 2025-04-16 NOTE — PROGRESS NOTES
Glenda Scruggs  : 1965  Primary: [facility] [address] (Commercial)  Secondary:  [facility] [place] [address]  [facility] [address]  Phone: [phone]  Fax: [phone] Plan Frequency: 2x a week for up to 90 days (1x a week for up to 90 days for pt preference)  Plan of Care/Certification Expiration Date: 25        Plan of Care/Certification Expiration Date:  Plan of Care/Certification Expiration Date: 25    Frequency/Duration: Plan Frequency: 2x a week for up to 90 days (1x a week for up to 90 days for pt preference)      Time In/Out:   Time In: 1539  Time Out: 162      PT Visit Info:    Progress Note Counter: 10      Visit Count:  10    OUTPATIENT PHYSICAL THERAPY:   Treatment Note 2025       Episode  (R-TKA knee pain)               Treatment Diagnosis:    Right knee pain, unspecified chronicity  Status post total right knee replacement  Medical/Referring Diagnosis:    S/P total knee arthroplasty, right    Referring Physician:  David Collado MD MD Orders:  PT Eval and Treat   Return MD Appt:     Date of Onset:  No data recorded   Allergies:   Amoxicillin and Oxycodone  Restrictions/Precautions:   None      Interventions Planned (Treatment may consist of any combination of the following):     See Assessment Note    Subjective Comments:   Pt reports no pain now, some soreness but now feeling     Initial Pain Level: 1/10  Post Session Pain Level: 1/10    Medications Last Reviewed: 2025  Updated Objective Findings:   Less swelling and soreness     Treatment   THERAPEUTIC EXERCISE: (30 minutes):    Exercises per grid below to improve mobility, strength, balance and coordination.  Required visual, verbal, manual and tactile cues to promote proper body alignment, promote proper body posture, promote proper body mechanics and promote proper body breathing techniques.  Progressed resistance, range, repetitions and complexity

## 2025-04-17 NOTE — PLAN OF CARE
Glenda Scruggs  : 1965  Primary: [facility] [address] (Commercial)  Secondary:  [facility] [place] [address]  [facility] [address]  Phone: [phone]  Fax: [phone] Plan Frequency: 2x a week for up to 90 days (1x a week for up to 90 days for pt preference)    Plan of Care/Certification Expiration Date: 25        Plan of Care/Certification Expiration Date:  Plan of Care/Certification Expiration Date: 25    Frequency/Duration: Plan Frequency: 2x a week for up to 90 days (1x a week for up to 90 days for pt preference)      Time In/Out:   Time In: 1015  Time Out: 1058      PT Visit Info:    Progress Note Counter: 1      Visit Count:  11                OUTPATIENT PHYSICAL THERAPY:             Progress Report 2025               Episode (R-TKA knee pain)         Treatment Diagnosis:     Right knee pain, unspecified chronicity  Status post total right knee replacement  Medical/Referring Diagnosis:    S/P total knee arthroplasty, right    Referring Physician:  David Collado MD MD Orders:  PT Eval and Treat   Return MD Appt:    Date of Onset:      Allergies:  Amoxicillin and Oxycodone  Restrictions/Precautions:    None      Medications Last Reviewed: 2025     SUBJECTIVE   History of Injury/Illness (Reason for Referral):  Pt arrives to the OPPT clinic w/ RLE Knee pain secondary to s/p R-TKA performed on, 25. Pt arrives into the OPPT clinic w/o an antalgic gait pattern. Pt reports that she had some prior PT for her R-TKA and reports that since then she feels pretty good. Pt reports now that since her R-TKA she reports having difficulty with ambulating farther distances and prolonged standing. Pt reports that putting on shoes, tying shoes and twisting or turning corners still gives her pain. Pt reports that she can navigate stairs in her home however does report weakness and instability after ambulating/navigating

## 2025-04-18 ENCOUNTER — HOSPITAL ENCOUNTER (OUTPATIENT)
Dept: PHYSICAL THERAPY | Age: 60
Setting detail: RECURRING SERIES
Discharge: HOME | End: 2025-04-20
Attending: ORTHOPAEDIC SURGERY
Payer: COMMERCIAL

## 2025-04-18 PROCEDURE — 97010 HOT OR COLD PACKS THERAPY: CPT

## 2025-04-18 PROCEDURE — 97110 THERAPEUTIC EXERCISES: CPT

## 2025-04-18 NOTE — PROGRESS NOTES
Glenda Scruggs  : 1965  Primary: [facility] [address] (Commercial)  Secondary:  [facility] @ [place] [address]  [facility] [address]  Phone: [phone]  Fax: [phone] Plan Frequency: 2x a week for up to 90 days (1x a week for up to 90 days for pt preference)  Plan of Care/Certification Expiration Date: 25        Plan of Care/Certification Expiration Date:  Plan of Care/Certification Expiration Date: 25    Frequency/Duration: Plan Frequency: 2x a week for up to 90 days (1x a week for up to 90 days for pt preference)      Time In/Out:   Time In: 1015  Time Out: 1058      PT Visit Info:    Progress Note Counter: 1      Visit Count:  11    OUTPATIENT PHYSICAL THERAPY:   Treatment Note 2025       Episode  (R-TKA knee pain)               Treatment Diagnosis:    Right knee pain, unspecified chronicity  Status post total right knee replacement  Medical/Referring Diagnosis:    S/P total knee arthroplasty, right    Referring Physician:  David Collado MD MD Orders:  PT Eval and Treat   Return MD Appt:     Date of Onset:  No data recorded   Allergies:   Amoxicillin and Oxycodone  Restrictions/Precautions:   None      Interventions Planned (Treatment may consist of any combination of the following):     See Assessment Note    Subjective Comments:   Pt reports no pain now best she has felt since before the surgery     Initial Pain Level: 0/10  Post Session Pain Level: 0/10    Medications Last Reviewed: 2025  Updated Objective Findings:   Less swelling and soreness     Treatment   THERAPEUTIC EXERCISE: (30 minutes):    Exercises per grid below to improve mobility, strength, balance and coordination.  Required visual, verbal, manual and tactile cues to promote proper body alignment, promote proper body posture, promote proper body mechanics and promote proper body breathing techniques.  Progressed resistance, range, repetitions and

## 2025-04-23 ENCOUNTER — HOSPITAL ENCOUNTER (OUTPATIENT)
Dept: PHYSICAL THERAPY | Age: 60
Setting detail: RECURRING SERIES
Discharge: HOME | End: 2025-04-25
Attending: ORTHOPAEDIC SURGERY
Payer: COMMERCIAL

## 2025-04-23 PROCEDURE — 97110 THERAPEUTIC EXERCISES: CPT

## 2025-04-23 PROCEDURE — 97010 HOT OR COLD PACKS THERAPY: CPT

## 2025-04-23 PROCEDURE — 97140 MANUAL THERAPY 1/> REGIONS: CPT

## 2025-04-23 NOTE — PROGRESS NOTES
Glenda Scruggs  : 1965  Primary: [facility] [address] (Commercial)  Secondary:  [facility] @ [place] [address]  [facility] [address]  Phone: [phone]  Fax: [phone] Plan Frequency: 2x a week for up to 90 days (1x a week for up to 90 days for pt preference)  Plan of Care/Certification Expiration Date: 25        Plan of Care/Certification Expiration Date:  Plan of Care/Certification Expiration Date: 25    Frequency/Duration: Plan Frequency: 2x a week for up to 90 days (1x a week for up to 90 days for pt preference)      Time In/Out:   Time In: 1502  Time Out: 154      PT Visit Info:    Progress Note Counter: 2      Visit Count:  12    OUTPATIENT PHYSICAL THERAPY:   Treatment Note 2025       Episode  (R-TKA knee pain)               Treatment Diagnosis:    Right knee pain, unspecified chronicity  Status post total right knee replacement  Medical/Referring Diagnosis:    S/P total knee arthroplasty, right    Referring Physician:  David Collado MD MD Orders:  PT Eval and Treat   Return MD Appt:     Date of Onset:  No data recorded   Allergies:   Amoxicillin and Oxycodone  Restrictions/Precautions:   None      Interventions Planned (Treatment may consist of any combination of the following):     See Assessment Note    Subjective Comments:   Pt reports no pain now best she has felt since before the surgery, feels good right now     Initial Pain Level: 0/10  Post Session Pain Level: 0/10    Medications Last Reviewed: 2025  Updated Objective Findings:   Less swelling and soreness     Treatment   THERAPEUTIC EXERCISE: (30 minutes):    Exercises per grid below to improve mobility, strength, balance and coordination.  Required visual, verbal, manual and tactile cues to promote proper body alignment, promote proper body posture, promote proper body mechanics and promote proper body breathing techniques.  Progressed resistance,

## 2025-04-25 ENCOUNTER — HOSPITAL ENCOUNTER (OUTPATIENT)
Dept: PHYSICAL THERAPY | Age: 60
Setting detail: RECURRING SERIES
Discharge: HOME | End: 2025-04-27
Attending: ORTHOPAEDIC SURGERY
Payer: COMMERCIAL

## 2025-04-25 PROCEDURE — 97110 THERAPEUTIC EXERCISES: CPT

## 2025-04-25 PROCEDURE — 97140 MANUAL THERAPY 1/> REGIONS: CPT

## 2025-04-25 PROCEDURE — 97010 HOT OR COLD PACKS THERAPY: CPT

## 2025-04-25 NOTE — PROGRESS NOTES
range, repetitions and complexity of movement as indicated.   Date:4/16/25   Date:4/23/25     Activity/Exercise     Pt ed on biomechanics, PT POC, HEP, outcomes, prognosis, BLE strengthening education, BLE ROM education,TKA procedure/protocol/prognosis outlook discussions, red flag s/s of a failed TKA procedure        SCIFIT  Small Nustep   Level 2  10 minutes  Small Nustep   Level 2  10 minutes    Heel slides      Supine SLR hamstring stretch  2 x 60 sec hold BLE 2 x 60 sec hold BLE   SLR   X 20- 5 sec holds    SAQ X 30 #4      **hot pack placed over anterior RLE Knee simultaneously       X 30 #4      **hot pack placed over anterior RLE Knee simultaneously    Clams      Hip abduction      Bridge     Supine 90/90 gravity assist knee flexion stretch, cues for controlled motion        THERAPEUTIC ACTIVITY: ( minutes):    Therapeutic activities per grid below to improve mobility, strength, balance and coordination.Required visual, verbal, manual and tactile cues to promote motor control of bilateral, lower extremity(s).   Date:  3-26-25 Date:   Date:     Activity/Exercise Parameters Parameters Parameters   Working on placement of heel lift and rearfoot wedge 4 minutes                    NEUROMUSCULAR RE-EDUCATION: (0 minutes):    Exercise/activities per grid below to improve balance, coordination, kinesthetic sense, posture and proprioception.  Required visual, verbal, manual and tactile cues to promote motor control of bilateral, lower extremity(s).   Date:   Date:   Date:     Activity/Exercise Parameters Parameters Parameters                       MANUAL THERAPY: (8 minutes):   Joint mobilization, Soft tissue mobilization, Manipulation and Manual lymphatic drainage was utilized and necessary because of the patient's restricted joint motion, painful spasm, loss of articular motion and restricted motion of soft tissue.    Date:  3-19-25 Date:  3-24-25 Date:  3/31/25     Activity/Exercise Parameters Parameters

## 2025-04-28 ENCOUNTER — HOSPITAL ENCOUNTER (OUTPATIENT)
Dept: PHYSICAL THERAPY | Age: 60
Setting detail: RECURRING SERIES
Discharge: HOME | End: 2025-04-30
Attending: ORTHOPAEDIC SURGERY
Payer: COMMERCIAL

## 2025-04-28 PROCEDURE — 97110 THERAPEUTIC EXERCISES: CPT

## 2025-04-28 PROCEDURE — 97112 NEUROMUSCULAR REEDUCATION: CPT

## 2025-04-28 PROCEDURE — 97010 HOT OR COLD PACKS THERAPY: CPT

## 2025-04-28 NOTE — PROGRESS NOTES
Glenda Scruggs  : 1965  Primary: [facility] [address] (Commercial)  Secondary:  [facility] @ [place] [address]  [facility] [address]  Phone: [phone]  Fax: [phone] Plan Frequency: 2x a week for up to 90 days (1x a week for up to 90 days for pt preference)  Plan of Care/Certification Expiration Date: 25        Plan of Care/Certification Expiration Date:  Plan of Care/Certification Expiration Date: 25    Frequency/Duration: Plan Frequency: 2x a week for up to 90 days (1x a week for up to 90 days for pt preference)      Time In/Out:   Time In: 931  Time Out:       PT Visit Info:    Progress Note Counter: 3      Visit Count:  14    OUTPATIENT PHYSICAL THERAPY:   Treatment Note 2025       Episode  (R-TKA knee pain)               Treatment Diagnosis:    Right knee pain, unspecified chronicity  Status post total right knee replacement  Medical/Referring Diagnosis:    S/P total knee arthroplasty, right    Referring Physician:  David Collado MD MD Orders:  PT Eval and Treat   Return MD Appt:     Date of Onset:  No data recorded   Allergies:   Amoxicillin and Oxycodone  Restrictions/Precautions:   None      Interventions Planned (Treatment may consist of any combination of the following):     See Assessment Note    Subjective Comments:   Pt reports no pain now best she has felt since before the surgery, feels good right now, pt reports some increased anterior-lateral RLE leg pain after walking for prolonged distances     Initial Pain Level: 0/10  Post Session Pain Level: 0/10    Medications Last Reviewed: 2025  Updated Objective Findings:   Less swelling and soreness     Treatment   THERAPEUTIC EXERCISE: (15 minutes):    Exercises per grid below to improve mobility, strength, balance and coordination.  Required visual, verbal, manual and tactile cues to promote proper body alignment, promote proper body posture,

## 2025-04-30 ENCOUNTER — HOSPITAL ENCOUNTER (OUTPATIENT)
Dept: PHYSICAL THERAPY | Age: 60
Setting detail: RECURRING SERIES
Discharge: HOME | End: 2025-05-02
Attending: ORTHOPAEDIC SURGERY
Payer: COMMERCIAL

## 2025-04-30 PROCEDURE — 97112 NEUROMUSCULAR REEDUCATION: CPT

## 2025-04-30 PROCEDURE — 97010 HOT OR COLD PACKS THERAPY: CPT

## 2025-04-30 PROCEDURE — 97110 THERAPEUTIC EXERCISES: CPT

## 2025-04-30 NOTE — PROGRESS NOTES
Glenda Scruggs  : 1965  Primary: [facility] [address] (Commercial)  Secondary:  [facility] @ [place] [address]  [facility] [address]  Phone: [phone]  Fax: [phone] Plan Frequency: 2x a week for up to 90 days (1x a week for up to 90 days for pt preference)  Plan of Care/Certification Expiration Date: 25        Plan of Care/Certification Expiration Date:  Plan of Care/Certification Expiration Date: 25    Frequency/Duration: Plan Frequency: 2x a week for up to 90 days (1x a week for up to 90 days for pt preference)      Time In/Out:   Time In: 931  Time Out:       PT Visit Info:    Progress Note Counter: 4      Visit Count:  15    OUTPATIENT PHYSICAL THERAPY:   Treatment Note 2025       Episode  (R-TKA knee pain)               Treatment Diagnosis:    Right knee pain, unspecified chronicity  Status post total right knee replacement  Medical/Referring Diagnosis:    S/P total knee arthroplasty, right    Referring Physician:  David Collado MD MD Orders:  PT Eval and Treat   Return MD Appt:     Date of Onset:  No data recorded   Allergies:   Amoxicillin and Oxycodone  Restrictions/Precautions:   None      Interventions Planned (Treatment may consist of any combination of the following):     See Assessment Note    Subjective Comments:   pt reports increased soreness after treatment but reports improvement with gross pain and stiffness     Initial Pain Level: 0/10  Post Session Pain Level: 0/10    Medications Last Reviewed: 2025  Updated Objective Findings:   Less swelling and soreness     Treatment   THERAPEUTIC EXERCISE: (15 minutes):    Exercises per grid below to improve mobility, strength, balance and coordination.  Required visual, verbal, manual and tactile cues to promote proper body alignment, promote proper body posture, promote proper body mechanics and promote proper body breathing techniques.  Progressed

## 2025-05-05 ENCOUNTER — TELEPHONE (OUTPATIENT)
Dept: ORTHOPEDIC SURGERY | Age: 60
End: 2025-05-05

## 2025-05-05 DIAGNOSIS — Z09 POSTOPERATIVE EXAMINATION: Primary | ICD-10-CM

## 2025-05-05 RX ORDER — CLINDAMYCIN HYDROCHLORIDE 300 MG/1
CAPSULE ORAL
Qty: 3 CAPSULE | Refills: 0 | Status: SHIPPED | OUTPATIENT
Start: 2025-05-05

## 2025-05-05 NOTE — TELEPHONE ENCOUNTER
CHIEF COMPLAINT: Follow-up gastric carcinoma    Problem List:     Gastric cancer (CMS/[facility])    2/6/2018 Initial Diagnosis     Gastric cancer.  55-year-old female with a three-month history of indigestion and upper abdominal pain.  She was started on omeprazole for presumed reflux, symptoms were not relieved.  She underwent an EGD on 2/6/2018 with Dr. Matt that revealed a large, friable and ulcerated mass in the body of the stomach.  Biopsies returned moderately to poorly differentiated adenocarcinoma.  Staging studies including CT scan of the chest abdomen and pelvis were negative for distant disease.  She underwent diagnostic laparoscopic with peritoneal washings to complete staging on 3/13/2018.  Baseline CBC 3/9/2018 WBC 4820, hemoglobin 8.5, hematocrit 28.9%, platelet count 351,000, MCV 64.8, MCH 19.1, MCHC 29.4.  CEA normal at less than 0.50.  HER-2/erik testing from gastric body biopsy was negative.  CMP was normal other than for serum calcium slightly decreased at 8.6.           2/6/2018 Procedure     EGD revealed gastric ulcerated mass in body of stomach.  Pathology returned:   Final Diagnosis    1. GASTRIC BODY BIOPSY:  Invasive moderate to poorly differentiated adenocarcinoma with ulceration (see comment).   2. GASTRIC ANTRUM BIOPSY:  Minimal chronic gastritis without activity.  No intestinal metaplasia or dysplasia identified.  No Helicobacter pylori-like organisms identified on routine stains.   HER2/erik testing negative  FLUORESCENCE IN-SITU HYBRIDIZATION (FISH) REPORT:  Negative/Not Amplified  HER2/WILL-17 Ratio: 1.3           2/9/2018 Imaging     CT abdomen/pelvis IMPRESSION:     There is mild thickening and prominence of the mid gastric wall of the  body of the stomach, middle third.     Extragastric mass is not identified. The lesser sac and retrogastric  spaces are clear.     Visceral tumor, adenopathy, stranding or caking is not currently  identified as described. There is no retroperitoneal or  She had surgery in Feb. She has a dental appt tomorrow for a cleaning. Can you send her antibiotic in to [facility] @ [address]. Does she need to wait longer to go to the dentist? Please let her know.  She is allergic to PCN.    "retrogastric  adenopathy. The lower lung zones are clear with no evidence of reactive  pleural effusion or occult mass. There is no liver metastasis and no  other acute focal CT abnormality is identified within the abdomen or  pelvis.     Incidental note is made of a nonobstructing stone right kidney right  lower pole.         3/9/2018 Imaging     CT Chest IMPRESSION:  No acute intrathoracic abnormality. No definite evidence of  metastatic disease.         3/13/2018 Procedure     Diagnostic laproscopy with biopsy and lysis of adhesions.  Pathology returned:  Final Diagnosis   PERITONEAL NODULE, EXCISIONAL BIOPSY:  Benign fibrotic nodule. (See comment)  JFJ/klb    Electronically signed by Boaz Jerez MD on 3/15/2018 at 1624   Comment See result below    The biopsy shows a lobulated fibrotic nodule with some intermixed lymphoid cells. The nodule does not appear neoplastic. One cannot entirely exclude a \"burned out\" lymph node, or other implant. There is no evidence of carcinoma.      Abdominal wall washings benign:  Final Diagnosis    1. ABDOMINAL WASH, WALL:  Negative for malignancy.  Scant cellularity; chronic inflammatory cells and scattered benign mesothelial cells.   2. ABDOMINAL WASH, WALL:  Negative for malignancy.  Scant cellularity; chronic inflammatory cells and scattered benign mesothelial cells.   3. ABDOMINAL WASH, WALL:  Negative for malignancy.  Benign mesothelial cells and mixed inflammatory cells.                4/5/2018 - 9/27/2018 Chemotherapy     OP GASTRIC FLOT OXALIplatin / Leucovorin / Fluorouracil/Taxotere  x8         5/9/2018 Genetic Testing     Genetic testing negative for cancer next panel         5/17/2018 Imaging     CT chest abdomen and pelvis showed Shrinkage of the gastric tumor with no evidence of metastasis         6/29/2018 Surgery     Surgery       Procedure:  Subtotal gastrectomy showing 0.7 cm 2 out of 10 node positive moderately differentiated adenocarcinoma of the stomach.  " YpT2 N1 M0 stage IIa         10/29/2018 Imaging     CT chest abdomen pelvis with contrast shows no evidence of metastasis or recurrence but does have bilateral basilar pulmonary infiltrates.  Was started on Levaquin 10/19/18.            HISTORY OF PRESENT ILLNESS:  The patient is a 55 y.o. female, here for follow up on management of gastric carcinoma.  No evidence of disease on current CT.  Recovering from her chemotherapy quite well.  Still with cough after Levaquin albeit it got better on Levaquin but now is off Levaquin causes coming back.  Pulmonary infiltrates on CT suggesting bilateral pneumonia.  Not having as much trouble breathing.  Does not give history to suggest reflux post op.      Past Medical History:   Diagnosis Date   • Abdominal pain    • Acid reflux    • Gastric cancer (CMS/HCC)    • Gastric mass    • Gastric ulcer    • History of transfusion     no reaction 2018   • Wears glasses      Past Surgical History:   Procedure Laterality Date   • CERVICAL BIOPSY  W/ LOOP ELECTRODE EXCISION     • DIAGNOSTIC LAPAROSCOPY N/A 3/13/2018    Procedure: DIAGNOSTIC LAPAROSCOPY WITH BIOPSY, LYSIS OF ADHESIONS;  Surgeon: Nicole Crooks MD;  Location:  [facility];  Service: General   • ENDOSCOPY     • GASTRECTOMY N/A 6/29/2018    Procedure: SUBTOTAL GASTRECTOMY;  Surgeon: Nicole Crooks MD;  Location:  [facility];  Service: General   • TUBAL ABDOMINAL LIGATION     • VENOUS ACCESS DEVICE (PORT) INSERTION         No Known Allergies    Family History and Social History reviewed and changed as necessary      REVIEW OF SYSTEM:   Review of Systems   Constitutional: Negative for appetite change, chills, diaphoresis, fatigue, fever and unexpected weight change.   HENT:   Negative for mouth sores, sore throat and trouble swallowing.    Eyes: Negative for icterus.   Respiratory: Negative for cough, hemoptysis and shortness of breath.    Cardiovascular: Negative for chest pain, leg swelling and palpitations.   Gastrointestinal:  "Negative for abdominal distention, abdominal pain, blood in stool, constipation, diarrhea, nausea and vomiting.   Endocrine: Negative for hot flashes.   Genitourinary: Negative for bladder incontinence, difficulty urinating, dysuria, frequency and hematuria.    Musculoskeletal: Negative for gait problem, neck pain and neck stiffness.   Skin: Negative for rash.   Neurological: Negative for dizziness, gait problem, headaches, light-headedness and numbness.   Hematological: Negative for adenopathy. Does not bruise/bleed easily.   Psychiatric/Behavioral: Negative for depression. The patient is not nervous/anxious.    All other systems reviewed and are negative.       PHYSICAL EXAM    Vitals:    11/01/18 1514   BP: 126/77   Pulse: 83   Resp: 16   Temp: 97.4 °F (36.3 °C)   SpO2: 96%   Weight: 78 kg (172 lb)   Height: 157.5 cm (62\")     Constitutional: Appears well-developed and well-nourished. No distress.   ECOG: (1) Restricted in physically strenuous activity, ambulatory and able to do work of light nature  HENT:   Head: Normocephalic.   Mouth/Throat: Oropharynx is clear and moist.   Eyes: Conjunctivae are normal. Pupils are equal, round, and reactive to light. No scleral icterus.   Neck: Neck supple. No JVD present. No thyromegaly present.   Cardiovascular: Normal rate, regular rhythm and normal heart sounds.    Pulmonary/Chest: Breath sounds normal. No respiratory distress.   Abdominal: Soft. Exhibits no distension and no mass. There is no hepatosplenomegaly. There is no tenderness. There is no rebound and no guarding.   Musculoskeletal:Exhibits no edema, tenderness or deformity.   Neurological: Alert and oriented to person, place, and time. Exhibits normal muscle tone.   Skin: No ecchymosis, no petechiae and no rash noted. Not diaphoretic. No cyanosis. Nails show no clubbing.   Psychiatric: Normal mood and affect.   Vitals reviewed.      Ct Chest With Contrast    Result Date: 10/31/2018  There are increased markings " and consolidation at the lung bases bilaterally suggesting infiltrate. The findings are new when compared to the prior study. There is no definite recurrence or metastatic disease present.  D:  10/29/2018 E:  10/29/2018  This report was finalized on 10/31/2018 4:30 PM by Dr. Hayley Tan MD.      Ct Abdomen Pelvis With Contrast    Result Date: 10/31/2018  There are increased markings and consolidation at the lung bases bilaterally suggesting infiltrate. The findings are new when compared to the prior study. There is no definite recurrence or metastatic disease present.  D:  10/29/2018 E:  10/29/2018  This report was finalized on 10/31/2018 4:30 PM by Dr. Hayley Tan MD.              ASSESSMENT & PLAN:    1.  Follow-up pathological stage IIa gastric carcinoma:  2. Bibasilar pulmonary infiltrates shortness of air treated with Levaquin starting 10/19/18  3. Elevated liver enzymes    Discussion: below our the NCCN guidelines for surveillance.  No evidence of disease.  I will repeat her  CMP in about 6 weeks with her AST and ALT modestly elevated into the 80s for the first time but with scans negative for metastasis.  She is breathing better after her Levaquin.  She will see my nurse practitioner then for survivorship visit and to set up follow-up with my nurse practitioner per the following survivorship model.  I will give  azithromycin in the meantime for the persistent cough and pulmonary infiltrates having completed Levaquin but with persistent cough.  She will also raised the feet of the head of her bed a few inches to see if that helps with any potential reflux causing the infiltrates post gastrectomy.  Discussed with patient 40 minutes greater than 50% counseling.     Kaden Oswald MD    11/01/2018

## 2025-05-05 NOTE — TELEPHONE ENCOUNTER
Spoke with patient that Dr. Collado sent in Clindamycin for her. Patient verbalized understanding and voiced no other concerns at this time.

## 2025-05-07 ENCOUNTER — HOSPITAL ENCOUNTER (OUTPATIENT)
Dept: PHYSICAL THERAPY | Age: 60
Setting detail: RECURRING SERIES
Discharge: HOME | End: 2025-05-09
Attending: ORTHOPAEDIC SURGERY
Payer: COMMERCIAL

## 2025-05-07 PROCEDURE — 97110 THERAPEUTIC EXERCISES: CPT

## 2025-05-07 PROCEDURE — 97112 NEUROMUSCULAR REEDUCATION: CPT

## 2025-05-07 NOTE — PROGRESS NOTES
Glenda Scruggs  : 1965  Primary: [facility] [address] (Commercial)  Secondary:  [facility] @ [place] [address]  [facility] [address]  Phone: [phone]  Fax: [phone] Plan Frequency: 2x a week for up to 90 days (1x a week for up to 90 days for pt preference)  Plan of Care/Certification Expiration Date: 25        Plan of Care/Certification Expiration Date:  Plan of Care/Certification Expiration Date: 25    Frequency/Duration: Plan Frequency: 2x a week for up to 90 days (1x a week for up to 90 days for pt preference)      Time In/Out:   Time In: 932  Time Out:       PT Visit Info:    Progress Note Counter: 4      Visit Count:  16    OUTPATIENT PHYSICAL THERAPY:   Treatment Note 2025       Episode  (R-TKA knee pain)               Treatment Diagnosis:    Right knee pain, unspecified chronicity  Status post total right knee replacement  Medical/Referring Diagnosis:    S/P total knee arthroplasty, right    Referring Physician:  David Collado MD MD Orders:  PT Eval and Treat   Return MD Appt:     Date of Onset:  No data recorded   Allergies:   Amoxicillin and Oxycodone  Restrictions/Precautions:   None      Interventions Planned (Treatment may consist of any combination of the following):     See Assessment Note    Subjective Comments:   pt reports increased soreness after treatment but reports improvement with gross pain and stiffness     Initial Pain Level: 0/10  Post Session Pain Level: 0/10    Medications Last Reviewed: 2025  Updated Objective Findings:   Less swelling and soreness     Treatment   THERAPEUTIC EXERCISE: (27 minutes):    Exercises per grid below to improve mobility, strength, balance and coordination.  Required visual, verbal, manual and tactile cues to promote proper body alignment, promote proper body posture, promote proper body mechanics and promote proper body breathing techniques.  Progressed

## 2025-05-09 ENCOUNTER — HOSPITAL ENCOUNTER (OUTPATIENT)
Dept: PHYSICAL THERAPY | Age: 60
Setting detail: RECURRING SERIES
Discharge: HOME | End: 2025-05-11
Attending: ORTHOPAEDIC SURGERY
Payer: COMMERCIAL

## 2025-05-09 PROCEDURE — 97112 NEUROMUSCULAR REEDUCATION: CPT

## 2025-05-09 PROCEDURE — 97110 THERAPEUTIC EXERCISES: CPT

## 2025-05-09 NOTE — PROGRESS NOTES
Glenda Scruggs  : 1965  Primary: [facility] [address] (Commercial)  Secondary:  [facility] @ [place] [address]  [facility] [address]  Phone: [phone]  Fax: [phone] Plan Frequency: 2x a week for up to 90 days (1x a week for up to 90 days for pt preference)  Plan of Care/Certification Expiration Date: 25        Plan of Care/Certification Expiration Date:  Plan of Care/Certification Expiration Date: 25    Frequency/Duration: Plan Frequency: 2x a week for up to 90 days (1x a week for up to 90 days for pt preference)      Time In/Out:   Time In: 8  Time Out: 162      PT Visit Info:    Progress Note Counter: 5      Visit Count:  17    OUTPATIENT PHYSICAL THERAPY:   Treatment Note 2025       Episode  (R-TKA knee pain)               Treatment Diagnosis:    Right knee pain, unspecified chronicity  Status post total right knee replacement  Medical/Referring Diagnosis:    S/P total knee arthroplasty, right    Referring Physician:  David Collado MD MD Orders:  PT Eval and Treat   Return MD Appt:     Date of Onset:  No data recorded   Allergies:   Amoxicillin and Oxycodone  Restrictions/Precautions:   None      Interventions Planned (Treatment may consist of any combination of the following):     See Assessment Note    Subjective Comments:   pt reports feeling better no soreness, feels good!     Initial Pain Level: 0/10  Post Session Pain Level: 0/10    Medications Last Reviewed: 2025  Updated Objective Findings:   Less swelling and soreness     Treatment   THERAPEUTIC EXERCISE: (15 minutes):    Exercises per grid below to improve mobility, strength, balance and coordination.  Required visual, verbal, manual and tactile cues to promote proper body alignment, promote proper body posture, promote proper body mechanics and promote proper body breathing techniques.  Progressed resistance, range, repetitions and complexity of

## 2025-05-12 ENCOUNTER — APPOINTMENT (OUTPATIENT)
Dept: PHYSICAL THERAPY | Age: 60
End: 2025-05-12
Attending: ORTHOPAEDIC SURGERY
Payer: COMMERCIAL

## 2025-05-14 ENCOUNTER — HOSPITAL ENCOUNTER (OUTPATIENT)
Dept: PHYSICAL THERAPY | Age: 60
Setting detail: RECURRING SERIES
Discharge: HOME | End: 2025-05-16
Attending: ORTHOPAEDIC SURGERY
Payer: COMMERCIAL

## 2025-05-14 PROCEDURE — 97112 NEUROMUSCULAR REEDUCATION: CPT

## 2025-05-14 PROCEDURE — 97110 THERAPEUTIC EXERCISES: CPT

## 2025-05-14 NOTE — PROGRESS NOTES
Glenda Scruggs  : 1965  Primary: [facility] ([facility])  Secondary:  [facility] @ [place] [address]  [facility] [address]  Phone: [phone]  Fax: [phone] Plan Frequency: 2x a week for up to 90 days (1x a week for up to 90 days for pt preference)  Plan of Care/Certification Expiration Date: 25        Plan of Care/Certification Expiration Date:  Plan of Care/Certification Expiration Date: 25    Frequency/Duration: Plan Frequency: 2x a week for up to 90 days (1x a week for up to 90 days for pt preference)      Time In/Out:   Time In: 843  Time Out: 929      PT Visit Info:    Progress Note Counter: 6      Visit Count:  18    OUTPATIENT PHYSICAL THERAPY:   Treatment Note 2025       Episode  (R-TKA knee pain)               Treatment Diagnosis:    Right knee pain, unspecified chronicity  Status post total right knee replacement  Medical/Referring Diagnosis:    S/P total knee arthroplasty, right    Referring Physician:  David Collado MD MD Orders:  PT Eval and Treat   Return MD Appt:     Date of Onset:  No data recorded   Allergies:   Amoxicillin and Oxycodone  Restrictions/Precautions:   None      Interventions Planned (Treatment may consist of any combination of the following):     See Assessment Note    Subjective Comments:   Patient reports her aunt passed away earlier this week.      Initial Pain Level: 0/10  Post Session Pain Level: 0/10    Medications Last Reviewed: 2025  Updated Objective Findings:   none noted today     Treatment   THERAPEUTIC EXERCISE: (24 minutes):    Exercises per grid below to improve mobility, strength, balance and coordination.  Required visual, verbal, manual and tactile cues to promote proper body alignment, promote proper body posture, promote proper body mechanics and promote proper body breathing techniques.  Progressed resistance, range, repetitions and complexity of

## 2025-05-15 ENCOUNTER — APPOINTMENT (OUTPATIENT)
Dept: PHYSICAL THERAPY | Age: 60
End: 2025-05-15
Attending: ORTHOPAEDIC SURGERY
Payer: COMMERCIAL

## 2025-05-16 ENCOUNTER — HOSPITAL ENCOUNTER (OUTPATIENT)
Dept: PHYSICAL THERAPY | Age: 60
Setting detail: RECURRING SERIES
Discharge: HOME | End: 2025-05-18
Attending: ORTHOPAEDIC SURGERY
Payer: COMMERCIAL

## 2025-05-16 PROCEDURE — 97112 NEUROMUSCULAR REEDUCATION: CPT

## 2025-05-16 PROCEDURE — 97110 THERAPEUTIC EXERCISES: CPT

## 2025-05-16 PROCEDURE — 97010 HOT OR COLD PACKS THERAPY: CPT

## 2025-05-16 NOTE — PROGRESS NOTES
Glenda Scruggs  : 1965  Primary: [facility] ([facility])  Secondary:  [facility] @ [place] [address]  [facility] [address]  Phone: [phone]  Fax: [phone] Plan Frequency: 2x a week for up to 90 days (1x a week for up to 90 days for pt preference)  Plan of Care/Certification Expiration Date: 25        Plan of Care/Certification Expiration Date:  Plan of Care/Certification Expiration Date: 25    Frequency/Duration: Plan Frequency: 2x a week for up to 90 days (1x a week for up to 90 days for pt preference)      Time In/Out:   Time In: 849  Time Out: 929      PT Visit Info:    Progress Note Counter: 7      Visit Count:  19    OUTPATIENT PHYSICAL THERAPY:   Treatment Note 2025       Episode  (R-TKA knee pain)               Treatment Diagnosis:    Right knee pain, unspecified chronicity  Status post total right knee replacement  Medical/Referring Diagnosis:    S/P total knee arthroplasty, right    Referring Physician:  David Collado MD MD Orders:  PT Eval and Treat   Return MD Appt:     Date of Onset:  No data recorded   Allergies:   Amoxicillin and Oxycodone  Restrictions/Precautions:   None      Interventions Planned (Treatment may consist of any combination of the following):     See Assessment Note    Subjective Comments:   Patient reports her aunt passed away earlier this week, some lethargy from depression today      Initial Pain Level: 0/10  Post Session Pain Level: 0/10    Medications Last Reviewed: 2025  Updated Objective Findings:   none noted today     Treatment   THERAPEUTIC EXERCISE: (24 minutes):    Exercises per grid below to improve mobility, strength, balance and coordination.  Required visual, verbal, manual and tactile cues to promote proper body alignment, promote proper body posture, promote proper body mechanics and promote proper body breathing techniques.  Progressed resistance,

## 2025-05-19 ENCOUNTER — HOSPITAL ENCOUNTER (OUTPATIENT)
Dept: PHYSICAL THERAPY | Age: 60
Setting detail: RECURRING SERIES
Discharge: HOME | End: 2025-05-21
Attending: ORTHOPAEDIC SURGERY
Payer: COMMERCIAL

## 2025-05-19 PROCEDURE — 97112 NEUROMUSCULAR REEDUCATION: CPT

## 2025-05-19 PROCEDURE — 97010 HOT OR COLD PACKS THERAPY: CPT

## 2025-05-19 PROCEDURE — 97110 THERAPEUTIC EXERCISES: CPT

## 2025-05-19 NOTE — PROGRESS NOTES
Glenda Scruggs  : 1965  Primary: [facility] [address] (Com[facility])  Secondary:  [facility] [place] [address]  [facility] [address]  Phone: [phone]  Fax: [phone] Plan Frequency: 2x a week for up to 90 days (1x a week for up to 90 days for pt preference)  Plan of Care/Certification Expiration Date: 25        Plan of Care/Certification Expiration Date:  Plan of Care/Certification Expiration Date: 25    Frequency/Duration: Plan Frequency: 2x a week for up to 90 days (1x a week for up to 90 days for pt preference)      Time In/Out:   Time In: 1545  Time Out: 1626      PT Visit Info:    Progress Note Counter: 7      Visit Count:  20    OUTPATIENT PHYSICAL THERAPY:   Treatment Note 2025       Episode  (R-TKA knee pain)               Treatment Diagnosis:    Right knee pain, unspecified chronicity  Status post total right knee replacement  Medical/Referring Diagnosis:    S/P total knee arthroplasty, right    Referring Physician:  David Collado MD MD Orders:  PT Eval and Treat   Return MD Appt:     Date of Onset:  No data recorded   Allergies:   Amoxicillin and Oxycodone  Restrictions/Precautions:   None      Interventions Planned (Treatment may consist of any combination of the following):     See Assessment Note    Subjective Comments:   Patient reports her aunt passed away earlier this week, some lethargy from depression today      Initial Pain Level: 0/10  Post Session Pain Level: 0/10    Medications Last Reviewed: 2025  Updated Objective Findings:   none noted today     Treatment   THERAPEUTIC EXERCISE: (24 minutes):    Exercises per grid below to improve mobility, strength, balance and coordination.  Required visual, verbal, manual and tactile cues to promote proper body alignment, promote proper body posture, promote proper body mechanics and promote proper body breathing techniques.  Progressed resistance,

## 2025-05-23 ENCOUNTER — HOSPITAL ENCOUNTER (OUTPATIENT)
Dept: PHYSICAL THERAPY | Age: 60
Setting detail: RECURRING SERIES
Discharge: HOME | End: 2025-05-25
Attending: ORTHOPAEDIC SURGERY
Payer: COMMERCIAL

## 2025-05-23 PROCEDURE — 97110 THERAPEUTIC EXERCISES: CPT

## 2025-05-23 PROCEDURE — 97010 HOT OR COLD PACKS THERAPY: CPT

## 2025-05-23 PROCEDURE — 97112 NEUROMUSCULAR REEDUCATION: CPT

## 2025-05-23 NOTE — PROGRESS NOTES
Glenda Scruggs  : 1965  Primary: [facility] [address] (Com[facility])  Secondary:  [facility] [place] [address]  [facility] [address]  Phone: [phone]  Fax: [phone] Plan Frequency: 2x a week for up to 90 days (1x a week for up to 90 days for pt preference)  Plan of Care/Certification Expiration Date: 25        Plan of Care/Certification Expiration Date:  Plan of Care/Certification Expiration Date: 25    Frequency/Duration: Plan Frequency: 2x a week for up to 90 days (1x a week for up to 90 days for pt preference)      Time In/Out:   Time In: 1500  Time Out: 1540      PT Visit Info:    Progress Note Counter: 8      Visit Count:  21    OUTPATIENT PHYSICAL THERAPY:   Treatment Note 2025       Episode  (R-TKA knee pain)               Treatment Diagnosis:    Right knee pain, unspecified chronicity  Status post total right knee replacement  Medical/Referring Diagnosis:    S/P total knee arthroplasty, right    Referring Physician:  David Collado MD MD Orders:  PT Eval and Treat   Return MD Appt:     Date of Onset:  No data recorded   Allergies:   Amoxicillin and Oxycodone  Restrictions/Precautions:   None      Interventions Planned (Treatment may consist of any combination of the following):     See Assessment Note    Subjective Comments:   Patient reports that her RLE knee pain is improving still some minor soreness     Initial Pain Level: 0/10  Post Session Pain Level: 0/10    Medications Last Reviewed: 2025  Updated Objective Findings:   none noted today     Treatment   THERAPEUTIC EXERCISE: (24 minutes):    Exercises per grid below to improve mobility, strength, balance and coordination.  Required visual, verbal, manual and tactile cues to promote proper body alignment, promote proper body posture, promote proper body mechanics and promote proper body breathing techniques.  Progressed resistance, range, repetitions

## 2025-05-27 ENCOUNTER — HOSPITAL ENCOUNTER (OUTPATIENT)
Dept: PHYSICAL THERAPY | Age: 60
Setting detail: RECURRING SERIES
Discharge: HOME | End: 2025-05-29
Attending: ORTHOPAEDIC SURGERY
Payer: COMMERCIAL

## 2025-05-27 PROCEDURE — 97112 NEUROMUSCULAR REEDUCATION: CPT

## 2025-05-27 PROCEDURE — 97110 THERAPEUTIC EXERCISES: CPT

## 2025-05-27 NOTE — PROGRESS NOTES
Glenda Scruggs  : 1965  Primary: [facility] [address] ([facility])  Secondary:  [facility] [place] [address]  [facility] [address]  Phone: [phone]  Fax: [phone] Plan Frequency: 2x a week for up to 90 days (1x a week for up to 90 days for pt preference)  Plan of Care/Certification Expiration Date: 25        Plan of Care/Certification Expiration Date:  Plan of Care/Certification Expiration Date: 25    Frequency/Duration: Plan Frequency: 2x a week for up to 90 days (1x a week for up to 90 days for pt preference)      Time In/Out:   Time In: 1457  Time Out: 1536      PT Visit Info:    Progress Note Counter: 9      Visit Count:  22    OUTPATIENT PHYSICAL THERAPY:   Treatment Note 2025       Episode  (R-TKA knee pain)               Treatment Diagnosis:    Right knee pain, unspecified chronicity  Status post total right knee replacement  Medical/Referring Diagnosis:    S/P total knee arthroplasty, right    Referring Physician:  David Collado MD MD Orders:  PT Eval and Treat   Return MD Appt:     Date of Onset:  No data recorded   Allergies:   Amoxicillin and Oxycodone  Restrictions/Precautions:   None      Interventions Planned (Treatment may consist of any combination of the following):     See Assessment Note    Subjective Comments:   Patient reports that her RLE knee pain is improving still some minor soreness, stiffness at times, did well over the wkd     Initial Pain Level: 0/10  Post Session Pain Level: 0/10    Medications Last Reviewed: 2025  Updated Objective Findings:   none noted today     Treatment   THERAPEUTIC EXERCISE: (15 minutes):    Exercises per grid below to improve mobility, strength, balance and coordination.  Required visual, verbal, manual and tactile cues to promote proper body alignment, promote proper body posture, promote proper body mechanics and promote proper body breathing techniques.

## 2025-05-29 NOTE — THERAPY RECERTIFICATION
Glenda Ann Sheba  : 1965  Primary: [facility] [address] (Com[facility])  Secondary:  [facility] [place] [address]  [facility] [address]  Phone: [phone]  Fax: [phone] Plan Frequency: 2x a week for up to 90 days (1x a week for up to 90 days for pt preference)    Plan of Care/Certification Expiration Date: 25        Plan of Care/Certification Expiration Date:  Plan of Care/Certification Expiration Date: 25    Frequency/Duration: Plan Frequency: 2x a week for up to 90 days (1x a week for up to 90 days for pt preference)      Time In/Out:   Time In:       PT Visit Info:    Progress Note Counter: 10      Visit Count:  23                OUTPATIENT PHYSICAL THERAPY:             Recertification 2025               Episode (R-TKA knee pain)         Treatment Diagnosis:     Right knee pain, unspecified chronicity  Status post total right knee replacement  Medical/Referring Diagnosis:    S/P total knee arthroplasty, right    Referring Physician:  David Collado MD MD Orders:  PT Eval and Treat   Return MD Appt:    Date of Onset:      Allergies:  Amoxicillin and Oxycodone  Restrictions/Precautions:    None      Medications Last Reviewed: 2025     SUBJECTIVE   History of Injury/Illness (Reason for Referral):  Pt arrives to the OPPT clinic w/ RLE Knee pain secondary to s/p R-TKA performed on, 25. Pt arrives into the OPPT clinic w/o an antalgic gait pattern. Pt reports that she had some prior PT for her R-TKA and reports that since then she feels pretty good. Pt reports now that since her R-TKA she reports having difficulty with ambulating farther distances and prolonged standing. Pt reports that putting on shoes, tying shoes and twisting or turning corners still gives her pain. Pt reports that she can navigate stairs in her home however does report weakness and instability after ambulating/navigating stairs. Pt

## 2025-05-30 ENCOUNTER — HOSPITAL ENCOUNTER (OUTPATIENT)
Dept: PHYSICAL THERAPY | Age: 60
Setting detail: RECURRING SERIES
End: 2025-05-30
Attending: ORTHOPAEDIC SURGERY
Payer: COMMERCIAL

## 2025-05-30 PROCEDURE — 97112 NEUROMUSCULAR REEDUCATION: CPT

## 2025-05-30 PROCEDURE — 97110 THERAPEUTIC EXERCISES: CPT

## 2025-05-30 NOTE — PROGRESS NOTES
Glenda Scruggs  : 1965  Primary: [facility] (Com[facility])  Secondary:  [facility] [place] [address]  [facility] [address]  Phone: [phone]  Fax: [phone] Plan Frequency: 2x a week for up to 90 days (1x a week for up to 90 days for pt preference)  Plan of Care/Certification Expiration Date: 25        Plan of Care/Certification Expiration Date:  Plan of Care/Certification Expiration Date: 25    Frequency/Duration: Plan Frequency: 2x a week for up to 90 days (1x a week for up to 90 days for pt preference)      Time In/Out:   Time In:       PT Visit Info:    Progress Note Counter: 10      Visit Count:  23    OUTPATIENT PHYSICAL THERAPY:   Treatment Note 2025       Episode  (R-TKA knee pain)               Treatment Diagnosis:    Right knee pain, unspecified chronicity  Status post total right knee replacement  Medical/Referring Diagnosis:    S/P total knee arthroplasty, right    Referring Physician:  David Collado MD MD Orders:  PT Eval and Treat   Return MD Appt:     Date of Onset:  No data recorded   Allergies:   Amoxicillin and Oxycodone  Restrictions/Precautions:   None      Interventions Planned (Treatment may consist of any combination of the following):     See Assessment Note    Subjective Comments:   Patient reports that her RLE knee pain is improving still some minor soreness, stiffness at times, did well over the wkd     Initial Pain Level: 0/10  Post Session Pain Level: 0/10    Medications Last Reviewed: 2025  Updated Objective Findings:   none noted today     Treatment   THERAPEUTIC EXERCISE: (15 minutes):    Exercises per grid below to improve mobility, strength, balance and coordination.  Required visual, verbal, manual and tactile cues to promote proper body alignment, promote proper body posture, promote proper body mechanics and promote proper body breathing techniques.  Progressed  Progressed resistance, range, repetitions and complexity of movement as indicated.   Date:5/23/25   Date:5/27/25   Date:5/30/25     Activity/Exercise      Pt ed on biomechanics, PT POC, HEP, outcomes, prognosis, BLE strengthening education, BLE ROM education,TKA procedure/protocol/prognosis outlook discussions, red flag s/s of a failed TKA procedure         SCIFIT  10 mins  10 mins  10 mins    Heel slides       Supine SLR hamstring stretch  3 x 60 sec hold  3 x 60 sec hold  3 x 60 sec hold    SLR       SAQ 3 sets     10 x 5 sec hold   7.5#   **hot pack placed over anterior RLE Knee simultaneously 3 sets     10 x 5 sec hold   7.5#   **hot pack placed over anterior RLE Knee simultaneously    Supine 90/90 gravity assist knee flexion stretch, cues for controlled motion         THERAPEUTIC ACTIVITY: ( minutes):    Therapeutic activities per grid below to improve mobility, strength, balance and coordination.Required visual, verbal, manual and tactile cues to promote motor control of bilateral, lower extremity(s).   Date:  3-26-25 Date:   Date:     Activity/Exercise Parameters Parameters Parameters   Working on placement of heel lift and rearfoot wedge 4 minutes                    NEUROMUSCULAR RE-EDUCATION: (23 minutes):    Exercise/activities per grid below to improve balance, coordination, kinesthetic sense, posture and proprioception.  Required visual, verbal, manual and tactile cues to promote motor control of bilateral, lower extremity(s).   Date:5/19/25   Date:5/23/25   Date:5/27/25   Date:5/30/25     Activity/Exercise       Supine bridges cues for deep core activation, posterior pelvic tilts, BLE hip extensor activation   10 x 10 sec hold BTB   - x 5 with 10 pulses in abd  simultaneously 10 x 10 sec hold BTB   - x 5 with 10 pulses in abd  simultaneously 10 x 10 sec hold BTB   - x 5 with 10 pulses in abd  simultaneously 10 x 10 sec hold BTB   - x 5 with 10 pulses in abd  simultaneously     Clamshells cues for BLE

## 2025-06-03 ENCOUNTER — HOSPITAL ENCOUNTER (OUTPATIENT)
Dept: PHYSICAL THERAPY | Age: 60
Setting detail: RECURRING SERIES
Discharge: HOME | End: 2025-06-05
Attending: ORTHOPAEDIC SURGERY
Payer: COMMERCIAL

## 2025-06-03 PROCEDURE — 97112 NEUROMUSCULAR REEDUCATION: CPT

## 2025-06-03 PROCEDURE — 97110 THERAPEUTIC EXERCISES: CPT

## 2025-06-03 NOTE — PROGRESS NOTES
Glenda Scruggs  : 1965  Primary: [facility] ([facility])  Secondary:  [facility] [place] [address]  [facility] [address]  Phone: [phone]  Fax: [phone] Plan Frequency: 2x a week for up to 90 days (1x a week for up to 90 days for pt preference)  Plan of Care/Certification Expiration Date: 25        Plan of Care/Certification Expiration Date:  Plan of Care/Certification Expiration Date: 25    Frequency/Duration: Plan Frequency: 2x a week for up to 90 days (1x a week for up to 90 days for pt preference)      Time In/Out:   Time In: 1100  Time Out: 1140      PT Visit Info:    Progress Note Counter: 1      Visit Count:  24    OUTPATIENT PHYSICAL THERAPY:   Treatment Note 6/3/2025       Episode  (R-TKA knee pain)               Treatment Diagnosis:    Right knee pain, unspecified chronicity  Status post total right knee replacement  Medical/Referring Diagnosis:    S/P total knee arthroplasty, right    Referring Physician:  David Collado MD MD Orders:  PT Eval and Treat   Return MD Appt:     Date of Onset:  No data recorded   Allergies:   Amoxicillin and Oxycodone  Restrictions/Precautions:   None      Interventions Planned (Treatment may consist of any combination of the following):     See Assessment Note    Subjective Comments:   Patient reports that her RLE knee pain is improving still some minor soreness, stiffness at times, did well over the wkd     Initial Pain Level: 0/10  Post Session Pain Level: 0/10    Medications Last Reviewed: 6/3/2025  Updated Objective Findings:   none noted today     Treatment   THERAPEUTIC EXERCISE: (15 minutes):    Exercises per grid below to improve mobility, strength, balance and coordination.  Required visual, verbal, manual and tactile cues to promote proper body alignment, promote proper body posture, promote proper body mechanics and promote proper body breathing techniques.

## 2025-06-10 ENCOUNTER — HOSPITAL ENCOUNTER (OUTPATIENT)
Dept: PHYSICAL THERAPY | Age: 60
Setting detail: RECURRING SERIES
Discharge: HOME | End: 2025-06-12
Attending: ORTHOPAEDIC SURGERY
Payer: COMMERCIAL

## 2025-06-10 PROCEDURE — 97112 NEUROMUSCULAR REEDUCATION: CPT

## 2025-06-10 PROCEDURE — 97110 THERAPEUTIC EXERCISES: CPT

## 2025-06-10 NOTE — PROGRESS NOTES
Glenda Scruggs  : 1965  Primary: [facility] [address] ([facility])  Secondary:  [facility] [place] [address]  [facility] [address]  Phone: [phone]  Fax: [phone] Plan Frequency: 2x a week for up to 90 days (1x a week for up to 90 days for pt preference)  Plan of Care/Certification Expiration Date: 25        Plan of Care/Certification Expiration Date:  Plan of Care/Certification Expiration Date: 25    Frequency/Duration: Plan Frequency: 2x a week for up to 90 days (1x a week for up to 90 days for pt preference)      Time In/Out:   Time In: 1015  Time Out: 1057      PT Visit Info:    Progress Note Counter: 2      Visit Count:  25    OUTPATIENT PHYSICAL THERAPY:   Treatment Note 6/10/2025       Episode  (R-TKA knee pain)               Treatment Diagnosis:    Right knee pain, unspecified chronicity  Status post total right knee replacement  Medical/Referring Diagnosis:    S/P total knee arthroplasty, right    Referring Physician:  David Collado MD MD Orders:  PT Eval and Treat   Return MD Appt:     Date of Onset:  No data recorded   Allergies:   Amoxicillin and Oxycodone  Restrictions/Precautions:   None      Interventions Planned (Treatment may consist of any combination of the following):     See Assessment Note    Subjective Comments:   Patient reports no pain, feels pretty good     Initial Pain Level: 0/10  Post Session Pain Level: 0/10    Medications Last Reviewed: 6/10/2025  Updated Objective Findings:   none noted today     Treatment   THERAPEUTIC EXERCISE: (15 minutes):    Exercises per grid below to improve mobility, strength, balance and coordination.  Required visual, verbal, manual and tactile cues to promote proper body alignment, promote proper body posture, promote proper body mechanics and promote proper body breathing techniques.  Progressed resistance, range, repetitions and complexity of movement as

## 2025-06-12 ENCOUNTER — HOSPITAL ENCOUNTER (OUTPATIENT)
Dept: PHYSICAL THERAPY | Age: 60
Setting detail: RECURRING SERIES
Discharge: HOME | End: 2025-06-14
Attending: ORTHOPAEDIC SURGERY
Payer: COMMERCIAL

## 2025-06-12 PROCEDURE — 97110 THERAPEUTIC EXERCISES: CPT

## 2025-06-12 PROCEDURE — 97112 NEUROMUSCULAR REEDUCATION: CPT

## 2025-06-12 NOTE — PROGRESS NOTES
Glenda Scruggs  : 1965  Primary: [facility] [address] (Commercial)  Secondary:  [facility] [place] [address]  [facility] [address]  Phone: [phone]  Fax: [phone] Plan Frequency: 2x a week for up to 90 days (1x a week for up to 90 days for pt preference)  Plan of Care/Certification Expiration Date: 25        Plan of Care/Certification Expiration Date:  Plan of Care/Certification Expiration Date: 25    Frequency/Duration: Plan Frequency: 2x a week for up to 90 days (1x a week for up to 90 days for pt preference)      Time In/Out:   Time In: 1013  Time Out: 1053      PT Visit Info:    Progress Note Counter: 3      Visit Count:  26    OUTPATIENT PHYSICAL THERAPY:   Treatment Note 2025       Episode  (R-TKA knee pain)               Treatment Diagnosis:    Right knee pain, unspecified chronicity  Status post total right knee replacement  Medical/Referring Diagnosis:    S/P total knee arthroplasty, right    Referring Physician:  David Collado MD MD Orders:  PT Eval and Treat   Return MD Appt:     Date of Onset:  No data recorded   Allergies:   Amoxicillin and Oxycodone  Restrictions/Precautions:   None      Interventions Planned (Treatment may consist of any combination of the following):     See Assessment Note    Subjective Comments:   Patient reports no pain, feels pretty good, some stiffness noted today     Initial Pain Level: 0/10  Post Session Pain Level: 0/10    Medications Last Reviewed: 2025  Updated Objective Findings:   none noted today     Treatment   THERAPEUTIC EXERCISE: (15 minutes):    Exercises per grid below to improve mobility, strength, balance and coordination.  Required visual, verbal, manual and tactile cues to promote proper body alignment, promote proper body posture, promote proper body mechanics and promote proper body breathing techniques.  Progressed resistance, range, repetitions and

## 2025-06-18 ENCOUNTER — HOSPITAL ENCOUNTER (OUTPATIENT)
Dept: PHYSICAL THERAPY | Age: 60
Setting detail: RECURRING SERIES
Discharge: HOME | End: 2025-06-20
Attending: ORTHOPAEDIC SURGERY
Payer: COMMERCIAL

## 2025-06-18 PROCEDURE — 97110 THERAPEUTIC EXERCISES: CPT

## 2025-06-18 PROCEDURE — 97112 NEUROMUSCULAR REEDUCATION: CPT

## 2025-06-18 NOTE — PROGRESS NOTES
Glenda Scruggs  : 1965  Primary: [facility] [address] (Commercial)  Secondary:  [facility] [place] [address]  [facility] [address]  Phone: [phone]  Fax: [phone] Plan Frequency: 2x a week for up to 90 days (1x a week for up to 90 days for pt preference)  Plan of Care/Certification Expiration Date: 25        Plan of Care/Certification Expiration Date:  Plan of Care/Certification Expiration Date: 25    Frequency/Duration: Plan Frequency: 2x a week for up to 90 days (1x a week for up to 90 days for pt preference)      Time In/Out:   Time In: 851  Time Out: 930      PT Visit Info:    Progress Note Counter: 4      Visit Count:  27    OUTPATIENT PHYSICAL THERAPY:   Treatment Note 2025       Episode  (R-TKA knee pain)               Treatment Diagnosis:    Right knee pain, unspecified chronicity  Status post total right knee replacement  Medical/Referring Diagnosis:    S/P total knee arthroplasty, right    Referring Physician:  David Collado MD MD Orders:  PT Eval and Treat   Return MD Appt:     Date of Onset:  No data recorded   Allergies:   Amoxicillin and Oxycodone  Restrictions/Precautions:   None      Interventions Planned (Treatment may consist of any combination of the following):     See Assessment Note    Subjective Comments:   Patient reports no pain, feels pretty good, some stiffness noted today     Initial Pain Level: 0/10  Post Session Pain Level: 0/10    Medications Last Reviewed: 2025  Updated Objective Findings:  none noted today     Treatment   THERAPEUTIC EXERCISE: (15 minutes):    Exercises per grid below to improve mobility, strength, balance and coordination.  Required visual, verbal, manual and tactile cues to promote proper body alignment, promote proper body posture, promote proper body mechanics and promote proper body breathing techniques.  Progressed resistance, range, repetitions and

## 2025-06-20 ENCOUNTER — HOSPITAL ENCOUNTER (OUTPATIENT)
Dept: PHYSICAL THERAPY | Age: 60
Setting detail: RECURRING SERIES
Discharge: HOME | End: 2025-06-22
Attending: ORTHOPAEDIC SURGERY
Payer: COMMERCIAL

## 2025-06-20 PROCEDURE — 97110 THERAPEUTIC EXERCISES: CPT

## 2025-06-20 PROCEDURE — 97112 NEUROMUSCULAR REEDUCATION: CPT

## 2025-06-20 NOTE — PROGRESS NOTES
minutes   Time In: 1057  Time Out: 1139     Gregory Brandt PT         Charge Capture  Events  [facility] Portal  Appt Desk  Attendance Report     Future Appointments   Date Time Provider Department Center   6/27/2025  8:00 AM Gregory Brandt, PT [facility]   7/1/2025  9:30 AM Gregory Brandt, PT [facility]   7/11/2025 11:30 AM [facility]   7/31/2025  9:30 AM [facility] DEP   8/7/2025  8:00 AM Marianela Bailey PA [facility] DEP   10/16/2025  9:00 AM Chica Hurst [facility] AMB   4/10/2026  8:50 AM David Collado MD [facility] AMB

## 2025-06-27 ENCOUNTER — HOSPITAL ENCOUNTER (OUTPATIENT)
Dept: PHYSICAL THERAPY | Age: 60
Setting detail: RECURRING SERIES
Discharge: HOME | End: 2025-06-29
Attending: ORTHOPAEDIC SURGERY
Payer: COMMERCIAL

## 2025-06-27 PROCEDURE — 97112 NEUROMUSCULAR REEDUCATION: CPT

## 2025-06-27 PROCEDURE — 97110 THERAPEUTIC EXERCISES: CPT

## 2025-06-27 NOTE — PROGRESS NOTES
Glenda Scruggs  : 1965  Primary: [facility] [address] (Commercial)  Secondary:  [facility] @ [place] [address]  [facility] [address]  Phone: [phone]  Fax: [phone] Plan Frequency: 2x a week for up to 90 days (1x a week for up to 90 days for pt preference)  Plan of Care/Certification Expiration Date: 25        Plan of Care/Certification Expiration Date:  Plan of Care/Certification Expiration Date: 25    Frequency/Duration: Plan Frequency: 2x a week for up to 90 days (1x a week for up to 90 days for pt preference)      Time In/Out:   Time In: 802  Time Out: 841      PT Visit Info:    Progress Note Counter: 5      Visit Count:  29    OUTPATIENT PHYSICAL THERAPY:   Treatment Note 2025       Episode  (R-TKA knee pain)               Treatment Diagnosis:    Right knee pain, unspecified chronicity  Status post total right knee replacement  Medical/Referring Diagnosis:    S/P total knee arthroplasty, right    Referring Physician:  David Collado MD MD Orders:  PT Eval and Treat   Return MD Appt:     Date of Onset:  No data recorded   Allergies:   Amoxicillin and Oxycodone  Restrictions/Precautions:   None      Interventions Planned (Treatment may consist of any combination of the following):     See Assessment Note    Subjective Comments:   Patient reports no pain, feels pretty good, some stiffness noted today, overall feels good     Initial Pain Level: 0/10  Post Session Pain Level: 0/10    Medications Last Reviewed: 2025  Updated Objective Findings:  none noted today     Treatment   THERAPEUTIC EXERCISE: (15 minutes):    Exercises per grid below to improve mobility, strength, balance and coordination.  Required visual, verbal, manual and tactile cues to promote proper body alignment, promote proper body posture, promote proper body mechanics and promote proper body breathing techniques.  Progressed resistance, range,

## 2025-07-01 ENCOUNTER — HOSPITAL ENCOUNTER (OUTPATIENT)
Dept: PHYSICAL THERAPY | Age: 60
Setting detail: RECURRING SERIES
Discharge: HOME | End: 2025-07-03
Attending: ORTHOPAEDIC SURGERY
Payer: COMMERCIAL

## 2025-07-01 PROCEDURE — 97112 NEUROMUSCULAR REEDUCATION: CPT

## 2025-07-01 PROCEDURE — 97110 THERAPEUTIC EXERCISES: CPT

## 2025-07-01 NOTE — PROGRESS NOTES
Glenda Scruggs  : 1965  Primary: [facility] [address] (Commercial)  Secondary:  [facility] @ [place] [address]  [facility] [address]  Phone: [phone]  Fax: [phone] Plan Frequency: 2x a week for up to 90 days (1x a week for up to 90 days for pt preference)  Plan of Care/Certification Expiration Date: 25        Plan of Care/Certification Expiration Date:  Plan of Care/Certification Expiration Date: 25    Frequency/Duration: Plan Frequency: 2x a week for up to 90 days (1x a week for up to 90 days for pt preference)      Time In/Out:   Time In: 930  Time Out:       PT Visit Info:    Progress Note Counter: 5      Visit Count:  30    OUTPATIENT PHYSICAL THERAPY:   Treatment Note 2025       Episode  (R-TKA knee pain)               Treatment Diagnosis:    Right knee pain, unspecified chronicity  Status post total right knee replacement  Medical/Referring Diagnosis:    S/P total knee arthroplasty, right    Referring Physician:  David Collado MD MD Orders:  PT Eval and Treat   Return MD Appt:     Date of Onset:  No data recorded   Allergies:   Amoxicillin and Oxycodone  Restrictions/Precautions:   None      Interventions Planned (Treatment may consist of any combination of the following):     See Assessment Note    Subjective Comments:   Patient reports no pain, feels pretty good, some stiffness noted today, overall feels good     Initial Pain Level: 0/10  Post Session Pain Level: 0/10    Medications Last Reviewed: 2025  Updated Objective Findings:  none noted today     Treatment   THERAPEUTIC EXERCISE: (15 minutes):    Exercises per grid below to improve mobility, strength, balance and coordination.  Required visual, verbal, manual and tactile cues to promote proper body alignment, promote proper body posture, promote proper body mechanics and promote proper body breathing techniques.  Progressed resistance, range,

## 2025-07-01 NOTE — THERAPY DISCHARGE
Glenda Scruggs  : 1965  Primary: [facility] [address] (Commercial)  Secondary:  [facility] @ [place] [address]  [facility] [address]  Phone: [phone]  Fax: [phone] Plan Frequency: 2x a week for up to 90 days (1x a week for up to 90 days for pt preference)    Plan of Care/Certification Expiration Date: 25        Plan of Care/Certification Expiration Date:  Plan of Care/Certification Expiration Date: 25    Frequency/Duration: Plan Frequency: 2x a week for up to 90 days (1x a week for up to 90 days for pt preference)      Time In/Out:   Time In: 930  Time Out:       PT Visit Info:    Progress Note Counter: 5      Visit Count:  30                OUTPATIENT PHYSICAL THERAPY:             Discharge Summary 2025               Episode (R-TKA knee pain)         Treatment Diagnosis:     Right knee pain, unspecified chronicity  Status post total right knee replacement  Medical/Referring Diagnosis:    S/P total knee arthroplasty, right    Referring Physician:  David Collado MD MD Orders:  PT Eval and Treat   Return MD Appt:    Date of Onset:      Allergies:  Amoxicillin and Oxycodone  Restrictions/Precautions:    None      Medications Last Reviewed: 2025     SUBJECTIVE   History of Injury/Illness (Reason for Referral):  Pt arrives to the OPPT clinic w/ RLE Knee pain secondary to s/p R-TKA performed on, 25. Pt arrives into the OPPT clinic w/o an antalgic gait pattern. Pt reports that she had some prior PT for her R-TKA and reports that since then she feels pretty good. Pt reports now that since her R-TKA she reports having difficulty with ambulating farther distances and prolonged standing. Pt reports that putting on shoes, tying shoes and twisting or turning corners still gives her pain. Pt reports that she can navigate stairs in her home however does report weakness and instability after ambulating/navigating 
Negative

## 2025-07-03 ENCOUNTER — APPOINTMENT (OUTPATIENT)
Dept: PHYSICAL THERAPY | Age: 60
End: 2025-07-03
Attending: ORTHOPAEDIC SURGERY
Payer: COMMERCIAL

## 2025-07-08 ASSESSMENT — SLEEP AND FATIGUE QUESTIONNAIRES
HOW LIKELY ARE YOU TO NOD OFF OR FALL ASLEEP WHILE WATCHING TV: MODERATE CHANCE OF DOZING
HOW LIKELY ARE YOU TO NOD OFF OR FALL ASLEEP WHILE SITTING AND TALKING TO SOMEONE: WOULD NEVER DOZE
HOW LIKELY ARE YOU TO NOD OFF OR FALL ASLEEP WHILE SITTING INACTIVE IN A PUBLIC PLACE: WOULD NEVER DOZE
HOW LIKELY ARE YOU TO NOD OFF OR FALL ASLEEP WHILE WATCHING TV: MODERATE CHANCE OF DOZING
HOW LIKELY ARE YOU TO NOD OFF OR FALL ASLEEP WHEN YOU ARE A PASSENGER IN A CAR FOR AN HOUR WITHOUT A BREAK: WOULD NEVER DOZE
HOW LIKELY ARE YOU TO NOD OFF OR FALL ASLEEP WHILE LYING DOWN TO REST IN THE AFTERNOON WHEN CIRCUMSTANCES PERMIT: SLIGHT CHANCE OF DOZING
HOW LIKELY ARE YOU TO NOD OFF OR FALL ASLEEP WHILE SITTING QUIETLY AFTER LUNCH WITHOUT ALCOHOL: WOULD NEVER DOZE
HOW LIKELY ARE YOU TO NOD OFF OR FALL ASLEEP WHILE SITTING AND READING: WOULD NEVER DOZE
HOW LIKELY ARE YOU TO NOD OFF OR FALL ASLEEP WHILE SITTING AND TALKING TO SOMEONE: WOULD NEVER DOZE
ESS TOTAL SCORE: 3
HOW LIKELY ARE YOU TO NOD OFF OR FALL ASLEEP WHILE SITTING QUIETLY AFTER LUNCH WITHOUT ALCOHOL: WOULD NEVER DOZE
HOW LIKELY ARE YOU TO NOD OFF OR FALL ASLEEP WHILE SITTING INACTIVE IN A PUBLIC PLACE: WOULD NEVER DOZE
HOW LIKELY ARE YOU TO NOD OFF OR FALL ASLEEP IN A CAR, WHILE STOPPED FOR A FEW MINUTES IN TRAFFIC: WOULD NEVER DOZE
HOW LIKELY ARE YOU TO NOD OFF OR FALL ASLEEP WHILE LYING DOWN TO REST IN THE AFTERNOON WHEN CIRCUMSTANCES PERMIT: SLIGHT CHANCE OF DOZING
HOW LIKELY ARE YOU TO NOD OFF OR FALL ASLEEP WHILE SITTING AND READING: WOULD NEVER DOZE
HOW LIKELY ARE YOU TO NOD OFF OR FALL ASLEEP WHEN YOU ARE A PASSENGER IN A CAR FOR AN HOUR WITHOUT A BREAK: WOULD NEVER DOZE
HOW LIKELY ARE YOU TO NOD OFF OR FALL ASLEEP IN A CAR, WHILE STOPPED FOR A FEW MINUTES IN TRAFFIC: WOULD NEVER DOZE

## 2025-07-10 NOTE — PROGRESS NOTES
[facility]  [address]  [place] [address]  [phone]    Patient Name:  Glenda Scruggs    YOB: 1965      Office Visit 7/11/2025    CHIEF COMPLAINT:      Chief Complaint   Patient presents with    Sleep Apnea       HISTORY OF PRESENT ILLNESS:      The patient present  for management of insomnia and  sleep apnea.    To recap :    The patient indicated she had history of intermittent snoring for at least 5 years according to her spouse.  She denies any classic witnessed apnea but her  indicated to her that she has shallow breathing episodes frequently which seem to be getting worse more recently.  She indicated that she had trouble falling asleep and staying asleep.  She started to go to bed around midnight and she wake up at 7:30 in the morning.  She has at least 3-5 awakening every night.  She also reported morning headaches, daytime fatigue and waking up with a dry mouth.  Additional symptoms include nonrestorative sleep, concern with her  who potentially has restless leg or leg movement disorder.  She stated that her spouse has been diagnosed sleep apnea for at least 7 years and has been using CPAP religiously.  He seems to be getting more restless and toss and turn too much at nighttime and more recently had an episode where he was acting his dream and he was trying to fight someone in his sleep.  She is concerned that this may affect her.  There is no history of cataplexy, hypnagogic hallucinations, or sleep paralysis.  In addition, she denied any history of frequent leg movements, kicking at night, or an inability to keep the legs still.  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  This is clearly was a contributing factor to her insomnia.  She indicates she will use melatonin in the past but was not effective.     Interval history:    She is currently on CPAP at 10-15 cm.  She is using

## 2025-07-11 ENCOUNTER — OFFICE VISIT (OUTPATIENT)
Dept: SLEEP MEDICINE | Age: 60
End: 2025-07-11
Payer: COMMERCIAL

## 2025-07-11 VITALS
RESPIRATION RATE: 16 BRPM | WEIGHT: 201 LBS | OXYGEN SATURATION: 97 % | SYSTOLIC BLOOD PRESSURE: 106 MMHG | BODY MASS INDEX: 36.99 KG/M2 | HEIGHT: 62 IN | DIASTOLIC BLOOD PRESSURE: 74 MMHG | HEART RATE: 78 BPM

## 2025-07-11 DIAGNOSIS — G47.00 PERSISTENT DISORDER OF INITIATING OR MAINTAINING SLEEP: ICD-10-CM

## 2025-07-11 DIAGNOSIS — G47.33 OSA ON CPAP: Primary | ICD-10-CM

## 2025-07-11 DIAGNOSIS — E66.01 SEVERE OBESITY (BMI 35.0-39.9) WITH COMORBIDITY (HCC): ICD-10-CM

## 2025-07-11 PROCEDURE — 3017F COLORECTAL CA SCREEN DOC REV: CPT | Performed by: INTERNAL MEDICINE

## 2025-07-11 PROCEDURE — G8427 DOCREV CUR MEDS BY ELIG CLIN: HCPCS | Performed by: INTERNAL MEDICINE

## 2025-07-11 PROCEDURE — 1036F TOBACCO NON-USER: CPT | Performed by: INTERNAL MEDICINE

## 2025-07-11 PROCEDURE — 99214 OFFICE O/P EST MOD 30 MIN: CPT | Performed by: INTERNAL MEDICINE

## 2025-07-11 PROCEDURE — G8417 CALC BMI ABV UP PARAM F/U: HCPCS | Performed by: INTERNAL MEDICINE

## 2025-07-11 PROCEDURE — G2211 COMPLEX E/M VISIT ADD ON: HCPCS | Performed by: INTERNAL MEDICINE

## 2025-07-31 ENCOUNTER — LAB (OUTPATIENT)
Dept: FAMILY MEDICINE CLINIC | Facility: CLINIC | Age: 60
End: 2025-07-31

## 2025-07-31 DIAGNOSIS — E78.5 DYSLIPIDEMIA: ICD-10-CM

## 2025-07-31 DIAGNOSIS — E11.65 TYPE 2 DIABETES MELLITUS WITH HYPERGLYCEMIA, WITHOUT LONG-TERM CURRENT USE OF INSULIN (HCC): ICD-10-CM

## 2025-07-31 DIAGNOSIS — E03.9 HYPOTHYROIDISM, ADULT: ICD-10-CM

## 2025-07-31 LAB
ALBUMIN SERPL-MCNC: 3.9 G/DL (ref 3.5–5)
ALBUMIN/GLOB SERPL: 1.3 (ref 1–1.9)
ALP SERPL-CCNC: 105 U/L (ref 35–104)
ALT SERPL-CCNC: 20 U/L (ref 8–45)
ANION GAP SERPL CALC-SCNC: 14 MMOL/L (ref 7–16)
AST SERPL-CCNC: 22 U/L (ref 15–37)
BILIRUB SERPL-MCNC: 0.3 MG/DL (ref 0–1.2)
BUN SERPL-MCNC: 18 MG/DL (ref 6–23)
CALCIUM SERPL-MCNC: 9.9 MG/DL (ref 8.8–10.2)
CHLORIDE SERPL-SCNC: 101 MMOL/L (ref 98–107)
CHOLEST SERPL-MCNC: 114 MG/DL (ref 0–200)
CO2 SERPL-SCNC: 24 MMOL/L (ref 20–29)
CREAT SERPL-MCNC: 0.95 MG/DL (ref 0.6–1.1)
CREAT UR-MCNC: 215 MG/DL (ref 28–217)
EST. AVERAGE GLUCOSE BLD GHB EST-MCNC: 152 MG/DL
GLOBULIN SER CALC-MCNC: 3 G/DL (ref 2.3–3.5)
GLUCOSE SERPL-MCNC: 123 MG/DL (ref 70–99)
HBA1C MFR BLD: 6.9 % (ref 0–5.6)
HDLC SERPL-MCNC: 38 MG/DL (ref 40–60)
HDLC SERPL: 3 (ref 0–5)
LDLC SERPL CALC-MCNC: 54 MG/DL (ref 0–100)
MICROALBUMIN UR-MCNC: <1.2 MG/DL (ref 0–20)
MICROALBUMIN/CREAT UR-RTO: NORMAL MG/G (ref 0–30)
POTASSIUM SERPL-SCNC: 4.4 MMOL/L (ref 3.5–5.1)
PROT SERPL-MCNC: 6.9 G/DL (ref 6.3–8.2)
SODIUM SERPL-SCNC: 140 MMOL/L (ref 136–145)
TRIGL SERPL-MCNC: 107 MG/DL (ref 0–150)
TSH, 3RD GENERATION: 1.92 UIU/ML (ref 0.27–4.2)
VLDLC SERPL CALC-MCNC: 21 MG/DL (ref 6–23)

## 2025-08-06 PROBLEM — E66.812 CLASS 2 SEVERE OBESITY DUE TO EXCESS CALORIES WITH SERIOUS COMORBIDITY AND BODY MASS INDEX (BMI) OF 38.0 TO 38.9 IN ADULT (HCC): Status: RESOLVED | Noted: 2019-05-08 | Resolved: 2025-08-06

## 2025-08-06 PROBLEM — E66.01 CLASS 2 SEVERE OBESITY DUE TO EXCESS CALORIES WITH SERIOUS COMORBIDITY AND BODY MASS INDEX (BMI) OF 38.0 TO 38.9 IN ADULT (HCC): Status: RESOLVED | Noted: 2019-05-08 | Resolved: 2025-08-06

## 2025-08-07 ENCOUNTER — OFFICE VISIT (OUTPATIENT)
Dept: FAMILY MEDICINE CLINIC | Facility: CLINIC | Age: 60
End: 2025-08-07
Payer: COMMERCIAL

## 2025-08-07 VITALS
DIASTOLIC BLOOD PRESSURE: 72 MMHG | HEART RATE: 74 BPM | RESPIRATION RATE: 14 BRPM | BODY MASS INDEX: 36.62 KG/M2 | TEMPERATURE: 97.9 F | OXYGEN SATURATION: 97 % | WEIGHT: 199 LBS | HEIGHT: 62 IN | SYSTOLIC BLOOD PRESSURE: 112 MMHG

## 2025-08-07 DIAGNOSIS — Z00.00 LABORATORY EXAMINATION ORDERED AS PART OF A ROUTINE GENERAL MEDICAL EXAMINATION: ICD-10-CM

## 2025-08-07 DIAGNOSIS — K21.9 GASTROESOPHAGEAL REFLUX DISEASE, UNSPECIFIED WHETHER ESOPHAGITIS PRESENT: ICD-10-CM

## 2025-08-07 DIAGNOSIS — F41.1 GAD (GENERALIZED ANXIETY DISORDER): ICD-10-CM

## 2025-08-07 DIAGNOSIS — E11.65 TYPE 2 DIABETES MELLITUS WITH HYPERGLYCEMIA, WITHOUT LONG-TERM CURRENT USE OF INSULIN (HCC): Primary | ICD-10-CM

## 2025-08-07 DIAGNOSIS — R23.2 HOT FLASHES: ICD-10-CM

## 2025-08-07 DIAGNOSIS — E78.5 DYSLIPIDEMIA: ICD-10-CM

## 2025-08-07 DIAGNOSIS — F33.41 RECURRENT MAJOR DEPRESSIVE DISORDER, IN PARTIAL REMISSION: ICD-10-CM

## 2025-08-07 DIAGNOSIS — E66.01 SEVERE OBESITY (BMI 35.0-39.9) WITH COMORBIDITY (HCC): ICD-10-CM

## 2025-08-07 DIAGNOSIS — E03.9 HYPOTHYROIDISM, ADULT: ICD-10-CM

## 2025-08-07 PROCEDURE — 99214 OFFICE O/P EST MOD 30 MIN: CPT | Performed by: PHYSICIAN ASSISTANT

## 2025-08-07 PROCEDURE — G8417 CALC BMI ABV UP PARAM F/U: HCPCS | Performed by: PHYSICIAN ASSISTANT

## 2025-08-07 PROCEDURE — 1036F TOBACCO NON-USER: CPT | Performed by: PHYSICIAN ASSISTANT

## 2025-08-07 PROCEDURE — G8427 DOCREV CUR MEDS BY ELIG CLIN: HCPCS | Performed by: PHYSICIAN ASSISTANT

## 2025-08-07 PROCEDURE — 2022F DILAT RTA XM EVC RTNOPTHY: CPT | Performed by: PHYSICIAN ASSISTANT

## 2025-08-07 PROCEDURE — 3017F COLORECTAL CA SCREEN DOC REV: CPT | Performed by: PHYSICIAN ASSISTANT

## 2025-08-07 PROCEDURE — 3044F HG A1C LEVEL LT 7.0%: CPT | Performed by: PHYSICIAN ASSISTANT

## 2025-08-07 RX ORDER — LEVOTHYROXINE SODIUM 50 UG/1
50 TABLET ORAL
Qty: 90 TABLET | Refills: 3 | Status: SHIPPED | OUTPATIENT
Start: 2025-08-07

## 2025-08-07 RX ORDER — FAMOTIDINE 40 MG/1
40 TABLET, FILM COATED ORAL DAILY PRN
Qty: 90 TABLET | Refills: 1 | Status: SHIPPED | OUTPATIENT
Start: 2025-08-07

## 2025-08-07 RX ORDER — ROSUVASTATIN CALCIUM 5 MG/1
5 TABLET, COATED ORAL DAILY
Qty: 90 TABLET | Refills: 1 | Status: SHIPPED | OUTPATIENT
Start: 2025-08-07

## 2025-08-07 RX ORDER — LISINOPRIL 5 MG/1
5 TABLET ORAL DAILY
Qty: 90 TABLET | Refills: 1 | Status: SHIPPED | OUTPATIENT
Start: 2025-08-07

## 2025-08-07 RX ORDER — GABAPENTIN 100 MG/1
100 CAPSULE ORAL NIGHTLY
Qty: 90 CAPSULE | Refills: 1 | Status: SHIPPED | OUTPATIENT
Start: 2025-08-07 | End: 2026-02-03

## 2025-08-21 ENCOUNTER — OFFICE VISIT (OUTPATIENT)
Dept: BEHAVIORAL/MENTAL HEALTH CLINIC | Age: 60
End: 2025-08-21
Payer: COMMERCIAL

## 2025-08-21 VITALS
HEART RATE: 82 BPM | DIASTOLIC BLOOD PRESSURE: 88 MMHG | BODY MASS INDEX: 36.65 KG/M2 | TEMPERATURE: 97.8 F | SYSTOLIC BLOOD PRESSURE: 108 MMHG | OXYGEN SATURATION: 97 % | WEIGHT: 200.4 LBS

## 2025-08-21 DIAGNOSIS — F33.42 MDD (MAJOR DEPRESSIVE DISORDER), RECURRENT, IN FULL REMISSION: ICD-10-CM

## 2025-08-21 PROCEDURE — 1036F TOBACCO NON-USER: CPT | Performed by: STUDENT IN AN ORGANIZED HEALTH CARE EDUCATION/TRAINING PROGRAM

## 2025-08-21 PROCEDURE — G8427 DOCREV CUR MEDS BY ELIG CLIN: HCPCS | Performed by: STUDENT IN AN ORGANIZED HEALTH CARE EDUCATION/TRAINING PROGRAM

## 2025-08-21 PROCEDURE — 3017F COLORECTAL CA SCREEN DOC REV: CPT | Performed by: STUDENT IN AN ORGANIZED HEALTH CARE EDUCATION/TRAINING PROGRAM

## 2025-08-21 PROCEDURE — 99213 OFFICE O/P EST LOW 20 MIN: CPT | Performed by: STUDENT IN AN ORGANIZED HEALTH CARE EDUCATION/TRAINING PROGRAM

## 2025-08-21 PROCEDURE — G8417 CALC BMI ABV UP PARAM F/U: HCPCS | Performed by: STUDENT IN AN ORGANIZED HEALTH CARE EDUCATION/TRAINING PROGRAM

## 2025-08-21 RX ORDER — VILAZODONE HYDROCHLORIDE 40 MG/1
40 TABLET ORAL DAILY
Qty: 90 TABLET | Refills: 1 | Status: SHIPPED | OUTPATIENT
Start: 2025-08-21 | End: 2026-02-17

## 2025-08-21 ASSESSMENT — PATIENT HEALTH QUESTIONNAIRE - PHQ9
6. FEELING BAD ABOUT YOURSELF - OR THAT YOU ARE A FAILURE OR HAVE LET YOURSELF OR YOUR FAMILY DOWN: NEARLY EVERY DAY
SUM OF ALL RESPONSES TO PHQ QUESTIONS 1-9: 12
SUM OF ALL RESPONSES TO PHQ QUESTIONS 1-9: 12
2. FEELING DOWN, DEPRESSED OR HOPELESS: SEVERAL DAYS
10. IF YOU CHECKED OFF ANY PROBLEMS, HOW DIFFICULT HAVE THESE PROBLEMS MADE IT FOR YOU TO DO YOUR WORK, TAKE CARE OF THINGS AT HOME, OR GET ALONG WITH OTHER PEOPLE: NOT DIFFICULT AT ALL
3. TROUBLE FALLING OR STAYING ASLEEP: NEARLY EVERY DAY
SUM OF ALL RESPONSES TO PHQ QUESTIONS 1-9: 12
SUM OF ALL RESPONSES TO PHQ QUESTIONS 1-9: 12
5. POOR APPETITE OR OVEREATING: NOT AT ALL
9. THOUGHTS THAT YOU WOULD BE BETTER OFF DEAD, OR OF HURTING YOURSELF: NOT AT ALL
4. FEELING TIRED OR HAVING LITTLE ENERGY: NEARLY EVERY DAY
7. TROUBLE CONCENTRATING ON THINGS, SUCH AS READING THE NEWSPAPER OR WATCHING TELEVISION: SEVERAL DAYS
1. LITTLE INTEREST OR PLEASURE IN DOING THINGS: SEVERAL DAYS
8. MOVING OR SPEAKING SO SLOWLY THAT OTHER PEOPLE COULD HAVE NOTICED. OR THE OPPOSITE, BEING SO FIGETY OR RESTLESS THAT YOU HAVE BEEN MOVING AROUND A LOT MORE THAN USUAL: NOT AT ALL

## (undated) DEVICE — MASTISOL ADHESIVE LIQ 2/3ML

## (undated) DEVICE — PACK,SHOULDER,DRAPE,POUCH: Brand: MEDLINE

## (undated) DEVICE — SOLUTION IRRIG 3000ML 0.9% SOD CHL FLX CONT 0797208] ICU MEDICAL INC]

## (undated) DEVICE — 3M™ IOBAN™ 2 ANTIMICROBIAL INCISE DRAPE 6651EZ: Brand: IOBAN™ 2

## (undated) DEVICE — COLUMN DRAPE

## (undated) DEVICE — BLADE SHV CUT MENIS AGG + 4MM --

## (undated) DEVICE — SYR LR LCK 1ML GRAD NSAF 30ML --

## (undated) DEVICE — REM POLYHESIVE ADULT PATIENT RETURN ELECTRODE: Brand: VALLEYLAB

## (undated) DEVICE — STOCKINETTE,DOUBLE PLY,4X48,STERILE: Brand: MEDLINE

## (undated) DEVICE — SOLUTION IV 1000ML 0.9% SOD CHL

## (undated) DEVICE — SYRINGE MED 10ML LUERLOCK TIP W/O SFTY DISP

## (undated) DEVICE — SLING ARM SWTH UNIV FOAM 1 SZ FITS MOST

## (undated) DEVICE — BNDG,ELSTC,MATRIX,STRL,6"X5YD,LF,HOOK&LP: Brand: MEDLINE

## (undated) DEVICE — BUR SHV CUT HLLW 6 FLUT 5.5MM --

## (undated) DEVICE — DRAPE TWL SURG 16X26IN BLU ORB04] ALLCARE INC]

## (undated) DEVICE — 3M™ STERI-DRAPE™ U-DRAPE 1015: Brand: STERI-DRAPE™

## (undated) DEVICE — BLADE 1882040 5PK INFERIOR TURB 2MM

## (undated) DEVICE — SUTURE ETHLN SZ 2-0 L18IN NONABSORBABLE BLK L26MM PS 3/8 585H

## (undated) DEVICE — TOTAL KNEE: Brand: MEDLINE INDUSTRIES, INC.

## (undated) DEVICE — PACKING 8004008 NEURAY 200PK 25X76MM: Brand: NEURAY ®

## (undated) DEVICE — GOWN,REINFORCED,POLY,AURORA,XXLARGE,STR: Brand: MEDLINE

## (undated) DEVICE — MARKER SURG SKIN UTIL BLK REG TIP NONSMEARING W/ 6IN RUL

## (undated) DEVICE — ARM DRAPE

## (undated) DEVICE — SUTURE MCRYL SZ 4-0 L18IN ABSRB UD L19MM PS-2 3/8 CIR PRIM Y496G

## (undated) DEVICE — SUPPORT POS LEG COND DISP PD FOR TOT KNEE REPL

## (undated) DEVICE — DRSG AQUACEL SURG 3.5 X 10IN -- CONVERT TO ITEM 370183

## (undated) DEVICE — OINTMENT BACITRACIN 1 OZ TUBE --

## (undated) DEVICE — SUTURE VICRYL SZ 2-0 L36IN ABSRB UD L36MM CT-1 1/2 CIR J945H

## (undated) DEVICE — 90-S, SUCTION PROBE, NON-BENDABLE, MAX CUT LEVEL 11: Brand: SERFAS ENERGY

## (undated) DEVICE — MEDI-VAC NON-CONDUCTIVE SUCTION TUBING: Brand: CARDINAL HEALTH

## (undated) DEVICE — GLOVE SURG SZ 7 L12IN FNGR THK79MIL GRN LTX FREE

## (undated) DEVICE — SUT CHRMC 4-0 27IN RB1 BRN --

## (undated) DEVICE — SUT ETHLN 3-0 18IN PS1 BLK --

## (undated) DEVICE — BANDAGE COMPR W6INXL12FT SMOOTH FOR LIMB EXSANG ESMARCH

## (undated) DEVICE — ABDOMINAL PAD: Brand: DERMACEA

## (undated) DEVICE — HOOD: Brand: T7PLUS

## (undated) DEVICE — SUTURE SZ 0 27IN 5/8 CIR UR-6  TAPER PT VIOLET ABSRB VICRYL J603H

## (undated) DEVICE — BLADELESS OBTURATOR: Brand: WECK VISTA

## (undated) DEVICE — ZIP 16 SURGICAL SKIN CLOSURE DEVICE: Brand: ZIP 16 SURGICAL SKIN CLOSURE DEVICE

## (undated) DEVICE — SUTURE DEV SZ 2-0 WND CLSR ABSRB GS-22 VLOC COVIDIEN VLOCM2145

## (undated) DEVICE — NDL SPNE QNCKE 18GX3.5IN LF --

## (undated) DEVICE — HEAD AND NECK: Brand: MEDLINE INDUSTRIES, INC.

## (undated) DEVICE — DEVICE INFL PRSS G INDIC DISP (MUST BE PURC IN MULTIPLES OF 5)

## (undated) DEVICE — GARMENT,MEDLINE,DVT,INT,CALF,LG, GEN2: Brand: MEDLINE

## (undated) DEVICE — 3M™ STERI-DRAPE™ INCISE DRAPE 1050 (60CM X 45CM): Brand: STERI-DRAPE™

## (undated) DEVICE — STERILE PVP: Brand: MEDLINE INDUSTRIES, INC.

## (undated) DEVICE — SEAL

## (undated) DEVICE — SUTURE VICRYL SZ 0 L36IN ABSRB UD CT-1 L36MM 1/2 CIR TAPR PNT VCP946H

## (undated) DEVICE — PAD PT POS 36 IN SURGYPAD DISP

## (undated) DEVICE — 2000CC GUARDIAN II: Brand: GUARDIAN

## (undated) DEVICE — DRAPE,U/SHT,SPLIT,FILM,60X84,STERILE: Brand: MEDLINE

## (undated) DEVICE — SURGICAL PROCEDURE PACK BASIC ST FRANCIS

## (undated) DEVICE — ZIP DS DRESSING SHIELD: Brand: ZIP DS DRESSING SHIELD

## (undated) DEVICE — INFLOW CASSETTE TUBING, DO NOT USE IF PACKAGE IS DAMAGED: Brand: CROSSFLOW

## (undated) DEVICE — SOL ANTI-FOG 6ML MEDC -- MEDICHOICE - CONVERT TO 358427

## (undated) DEVICE — CARDINAL HEALTH FLEXIBLE LIGHT HANDLE COVER: Brand: CARDINAL HEALTH

## (undated) DEVICE — BALLOON SINUPLASTY 6X16 MM SYS RELIEVA SPINPLUS

## (undated) DEVICE — (D)PREP SKN CHLRAPRP APPL 26ML -- CONVERT TO ITEM 371833

## (undated) DEVICE — TRAY CATH 16F DRN BG LTX -- CONVERT TO ITEM 363158

## (undated) DEVICE — SUTURE ABS ANTIBACT 1-0 CTX 24IN STRATAFIX PDS+ SXPP1A445

## (undated) DEVICE — TIP COVER ACCESSORY

## (undated) DEVICE — [RESECTOR CUTTER, ARTHROSCOPIC SHAVER BLADE,  DO NOT RESTERILIZE,  DO NOT USE IF PACKAGE IS DAMAGED,  KEEP DRY,  KEEP AWAY FROM SUNLIGHT]: Brand: FORMULA

## (undated) DEVICE — GLOVE SURG SZ 65 THK91MIL LTX FREE SYN POLYISOPRENE

## (undated) DEVICE — GENERAL ROBOTIC: Brand: MEDLINE INDUSTRIES, INC.

## (undated) DEVICE — SOLUTION IRRIG 3000ML 0.9% SOD CHL USP UROMATIC PLAS CONT

## (undated) DEVICE — OUTFLOW CASSETTE TUBING, DO NOT USE IF PACKAGE IS DAMAGED: Brand: CROSSFLOW

## (undated) DEVICE — SUTURE VICRYL + SZ 1 L18IN ABSRB UD L36MM CT-1 1/2 CIR VCP841D

## (undated) DEVICE — NEEDLE HYPO 21GA L1.5IN INTRAMUSCULAR S STL LATCH BVL UP

## (undated) DEVICE — STERILE SYNTHETIC POLYISOPRENE POWDER-FREE SURGICAL GLOVES WITH HYDROGEL COATING, SMOOTH FINISH, STRAIGHT FINGER: Brand: PROTEXIS

## (undated) DEVICE — SUIT SURG ISOLATN ZIP TOGA XL T7 +

## (undated) DEVICE — SPLINT NSL SEPTAL SUPP REG PRE PUNCHED HOLE SIL STRL BRTH EZ

## (undated) DEVICE — GLOVE SURG SZ 8 L12IN FNGR THK79MIL GRN LTX FREE

## (undated) DEVICE — INSUFFLATION NEEDLE TO ESTABLISH PNEUMOPERITONEUM.: Brand: INSUFFLATION NEEDLE

## (undated) DEVICE — PUDDLEVAC FLOOR SUCTION DEVICE: Brand: PUDDLEVAC

## (undated) DEVICE — BANDAGE COBAN 6 IN WND 6INX5YD FOAM